# Patient Record
Sex: FEMALE | Race: WHITE | HISPANIC OR LATINO | URBAN - METROPOLITAN AREA
[De-identification: names, ages, dates, MRNs, and addresses within clinical notes are randomized per-mention and may not be internally consistent; named-entity substitution may affect disease eponyms.]

---

## 2017-06-23 ENCOUNTER — INPATIENT (INPATIENT)
Facility: HOSPITAL | Age: 82
LOS: 6 days | Discharge: HOME | DRG: 166 | End: 2017-06-30
Attending: STUDENT IN AN ORGANIZED HEALTH CARE EDUCATION/TRAINING PROGRAM | Admitting: STUDENT IN AN ORGANIZED HEALTH CARE EDUCATION/TRAINING PROGRAM
Payer: MEDICARE

## 2017-06-23 VITALS
HEART RATE: 77 BPM | RESPIRATION RATE: 18 BRPM | WEIGHT: 153 LBS | OXYGEN SATURATION: 98 % | HEIGHT: 64 IN | SYSTOLIC BLOOD PRESSURE: 136 MMHG | DIASTOLIC BLOOD PRESSURE: 74 MMHG | TEMPERATURE: 98 F

## 2017-06-23 DIAGNOSIS — K21.9 GASTRO-ESOPHAGEAL REFLUX DISEASE WITHOUT ESOPHAGITIS: ICD-10-CM

## 2017-06-23 DIAGNOSIS — Z90.710 ACQUIRED ABSENCE OF BOTH CERVIX AND UTERUS: Chronic | ICD-10-CM

## 2017-06-23 DIAGNOSIS — E11.8 TYPE 2 DIABETES MELLITUS WITH UNSPECIFIED COMPLICATIONS: ICD-10-CM

## 2017-06-23 DIAGNOSIS — D64.9 ANEMIA, UNSPECIFIED: ICD-10-CM

## 2017-06-23 DIAGNOSIS — Z90.49 ACQUIRED ABSENCE OF OTHER SPECIFIED PARTS OF DIGESTIVE TRACT: Chronic | ICD-10-CM

## 2017-06-23 DIAGNOSIS — N18.3 CHRONIC KIDNEY DISEASE, STAGE 3 (MODERATE): ICD-10-CM

## 2017-06-23 DIAGNOSIS — I10 ESSENTIAL (PRIMARY) HYPERTENSION: ICD-10-CM

## 2017-06-23 DIAGNOSIS — R05 COUGH: ICD-10-CM

## 2017-06-23 DIAGNOSIS — R63.8 OTHER SYMPTOMS AND SIGNS CONCERNING FOOD AND FLUID INTAKE: ICD-10-CM

## 2017-06-23 DIAGNOSIS — Z29.9 ENCOUNTER FOR PROPHYLACTIC MEASURES, UNSPECIFIED: ICD-10-CM

## 2017-06-23 LAB
ALBUMIN SERPL ELPH-MCNC: 3.9 G/DL — SIGNIFICANT CHANGE UP (ref 3.3–5)
ALP SERPL-CCNC: 76 U/L — SIGNIFICANT CHANGE UP (ref 40–120)
ALT FLD-CCNC: 8 U/L — LOW (ref 10–45)
ANION GAP SERPL CALC-SCNC: 14 MMOL/L — SIGNIFICANT CHANGE UP (ref 5–17)
AST SERPL-CCNC: 14 U/L — SIGNIFICANT CHANGE UP (ref 10–40)
BASOPHILS NFR BLD AUTO: 0.2 % — SIGNIFICANT CHANGE UP (ref 0–2)
BILIRUB SERPL-MCNC: 0.2 MG/DL — SIGNIFICANT CHANGE UP (ref 0.2–1.2)
BUN SERPL-MCNC: 24 MG/DL — HIGH (ref 7–23)
CALCIUM SERPL-MCNC: 9.6 MG/DL — SIGNIFICANT CHANGE UP (ref 8.4–10.5)
CHLORIDE SERPL-SCNC: 100 MMOL/L — SIGNIFICANT CHANGE UP (ref 96–108)
CO2 SERPL-SCNC: 26 MMOL/L — SIGNIFICANT CHANGE UP (ref 22–31)
CREAT SERPL-MCNC: 1.2 MG/DL — SIGNIFICANT CHANGE UP (ref 0.5–1.3)
EOSINOPHIL NFR BLD AUTO: 0.3 % — SIGNIFICANT CHANGE UP (ref 0–6)
GLUCOSE SERPL-MCNC: 103 MG/DL — HIGH (ref 70–99)
HCT VFR BLD CALC: 30.5 % — LOW (ref 34.5–45)
HGB BLD-MCNC: 9.7 G/DL — LOW (ref 11.5–15.5)
LYMPHOCYTES # BLD AUTO: 26.7 % — SIGNIFICANT CHANGE UP (ref 13–44)
MCHC RBC-ENTMCNC: 26.2 PG — LOW (ref 27–34)
MCHC RBC-ENTMCNC: 31.8 G/DL — LOW (ref 32–36)
MCV RBC AUTO: 82.4 FL — SIGNIFICANT CHANGE UP (ref 80–100)
MONOCYTES NFR BLD AUTO: 13.6 % — SIGNIFICANT CHANGE UP (ref 2–14)
NEUTROPHILS NFR BLD AUTO: 59.1 % — SIGNIFICANT CHANGE UP (ref 43–77)
NT-PROBNP SERPL-SCNC: 806 PG/ML — HIGH (ref 0–300)
PLATELET # BLD AUTO: 150 K/UL — SIGNIFICANT CHANGE UP (ref 150–400)
POTASSIUM SERPL-MCNC: 3.6 MMOL/L — SIGNIFICANT CHANGE UP (ref 3.5–5.3)
POTASSIUM SERPL-SCNC: 3.6 MMOL/L — SIGNIFICANT CHANGE UP (ref 3.5–5.3)
PROT SERPL-MCNC: 8 G/DL — SIGNIFICANT CHANGE UP (ref 6–8.3)
RBC # BLD: 3.7 M/UL — LOW (ref 3.8–5.2)
RBC # FLD: 14.2 % — SIGNIFICANT CHANGE UP (ref 10.3–16.9)
SODIUM SERPL-SCNC: 140 MMOL/L — SIGNIFICANT CHANGE UP (ref 135–145)
WBC # BLD: 3.9 K/UL — SIGNIFICANT CHANGE UP (ref 3.8–10.5)
WBC # FLD AUTO: 3.9 K/UL — SIGNIFICANT CHANGE UP (ref 3.8–10.5)

## 2017-06-23 PROCEDURE — 93010 ELECTROCARDIOGRAM REPORT: CPT

## 2017-06-23 PROCEDURE — 99285 EMERGENCY DEPT VISIT HI MDM: CPT

## 2017-06-23 PROCEDURE — 71020: CPT | Mod: 26

## 2017-06-23 PROCEDURE — 99285 EMERGENCY DEPT VISIT HI MDM: CPT | Mod: 25

## 2017-06-23 PROCEDURE — 71250 CT THORAX DX C-: CPT | Mod: 26

## 2017-06-23 RX ORDER — DIATRIZOATE MEGLUMINE 180 MG/ML
30 INJECTION, SOLUTION INTRAVESICAL ONCE
Qty: 0 | Refills: 0 | Status: COMPLETED | OUTPATIENT
Start: 2017-06-23 | End: 2017-06-23

## 2017-06-23 RX ORDER — PANTOPRAZOLE SODIUM 20 MG/1
1 TABLET, DELAYED RELEASE ORAL
Qty: 0 | Refills: 0 | COMMUNITY

## 2017-06-23 RX ORDER — FLUTICASONE FUROATE AND VILANTEROL TRIFENATATE 100; 25 UG/1; UG/1
1 POWDER RESPIRATORY (INHALATION) DAILY
Qty: 0 | Refills: 0 | Status: DISCONTINUED | OUTPATIENT
Start: 2017-06-23 | End: 2017-06-23

## 2017-06-23 RX ORDER — IPRATROPIUM/ALBUTEROL SULFATE 18-103MCG
3 AEROSOL WITH ADAPTER (GRAM) INHALATION ONCE
Qty: 0 | Refills: 0 | Status: COMPLETED | OUTPATIENT
Start: 2017-06-23 | End: 2017-06-23

## 2017-06-23 RX ORDER — BUDESONIDE AND FORMOTEROL FUMARATE DIHYDRATE 160; 4.5 UG/1; UG/1
2 AEROSOL RESPIRATORY (INHALATION)
Qty: 0 | Refills: 0 | Status: COMPLETED | OUTPATIENT
Start: 2017-06-23 | End: 2017-06-24

## 2017-06-23 RX ORDER — BECLOMETHASONE DIPROPIONATE 40 UG/1
1 AEROSOL, METERED RESPIRATORY (INHALATION)
Qty: 0 | Refills: 0 | Status: DISCONTINUED | OUTPATIENT
Start: 2017-06-23 | End: 2017-06-26

## 2017-06-23 RX ORDER — AMLODIPINE BESYLATE 2.5 MG/1
1 TABLET ORAL
Qty: 0 | Refills: 0 | COMMUNITY

## 2017-06-23 RX ORDER — HEPARIN SODIUM 5000 [USP'U]/ML
5000 INJECTION INTRAVENOUS; SUBCUTANEOUS EVERY 8 HOURS
Qty: 0 | Refills: 0 | Status: DISCONTINUED | OUTPATIENT
Start: 2017-06-23 | End: 2017-06-30

## 2017-06-23 RX ORDER — INSULIN LISPRO 100/ML
VIAL (ML) SUBCUTANEOUS
Qty: 0 | Refills: 0 | Status: DISCONTINUED | OUTPATIENT
Start: 2017-06-23 | End: 2017-06-30

## 2017-06-23 RX ORDER — SODIUM CHLORIDE 9 MG/ML
500 INJECTION INTRAMUSCULAR; INTRAVENOUS; SUBCUTANEOUS ONCE
Qty: 0 | Refills: 0 | Status: COMPLETED | OUTPATIENT
Start: 2017-06-23 | End: 2017-06-23

## 2017-06-23 RX ORDER — FLUTICASONE PROPIONATE 50 MCG
2 SPRAY, SUSPENSION NASAL DAILY
Qty: 0 | Refills: 0 | Status: DISCONTINUED | OUTPATIENT
Start: 2017-06-23 | End: 2017-06-30

## 2017-06-23 RX ORDER — SIMVASTATIN 20 MG/1
1 TABLET, FILM COATED ORAL
Qty: 0 | Refills: 0 | COMMUNITY

## 2017-06-23 RX ORDER — PANTOPRAZOLE SODIUM 20 MG/1
40 TABLET, DELAYED RELEASE ORAL
Qty: 0 | Refills: 0 | Status: DISCONTINUED | OUTPATIENT
Start: 2017-06-23 | End: 2017-06-23

## 2017-06-23 RX ORDER — PANTOPRAZOLE SODIUM 20 MG/1
20 TABLET, DELAYED RELEASE ORAL
Qty: 0 | Refills: 0 | Status: DISCONTINUED | OUTPATIENT
Start: 2017-06-23 | End: 2017-06-30

## 2017-06-23 RX ORDER — BUDESONIDE AND FORMOTEROL FUMARATE DIHYDRATE 160; 4.5 UG/1; UG/1
2 AEROSOL RESPIRATORY (INHALATION)
Qty: 0 | Refills: 0 | Status: DISCONTINUED | OUTPATIENT
Start: 2017-06-23 | End: 2017-06-23

## 2017-06-23 RX ADMIN — SODIUM CHLORIDE 1000 MILLILITER(S): 9 INJECTION INTRAMUSCULAR; INTRAVENOUS; SUBCUTANEOUS at 15:13

## 2017-06-23 RX ADMIN — HEPARIN SODIUM 5000 UNIT(S): 5000 INJECTION INTRAVENOUS; SUBCUTANEOUS at 22:07

## 2017-06-23 RX ADMIN — DIATRIZOATE MEGLUMINE 30 MILLILITER(S): 180 INJECTION, SOLUTION INTRAVESICAL at 21:08

## 2017-06-23 RX ADMIN — Medication 3 MILLILITER(S): at 15:13

## 2017-06-23 NOTE — H&P ADULT - PROBLEM SELECTOR PLAN 5
Chronic, baseline Hg 9.5-10. Has had BM biopsy in the past and follows with a hematologist.  -Continue to monitor in the hospital

## 2017-06-23 NOTE — ED PROVIDER NOTE - PROGRESS NOTE DETAILS
Ct scan shows possible miliary TB vs mets, pt informed pulm she did have a +PPD in the past. Pt placed on isoprecautions and quantferon gold ordered. Will admit to hospitalist for further workup and management. Pt remains HD stable, no resp distress.

## 2017-06-23 NOTE — CONSULT NOTE ADULT - ATTENDING COMMENTS
I have evaluated this very pleasant lady who is accompanied by her son. As mentioned, she has had cough lasting for several months now. She has lost 7 pounds of weight. Her appetite is poor. She denies any fevers or night sweats. The history of age-specific screening  is not clear.  There is no history of asthma. She is a lifelong nonsmoker  She has had sinusitis once in the past. No symptoms of postnasal drip recently  She does have symptoms of gastroesophageal reflux disease    On further questioning, the cough is very pronounced but it is nonproductive. She is from [place] and had a positive PPD in the past. No recent exposure to a family member with known tuberculosis    I concur with the physical examination examination of the lungs he is clear. She has good breath sounds bilaterally. There is no dullness to percussion noted. She has no cervical lymph nodes. Thyroid is not palpable. She has a Mallampatti score of 4    I reviewed the chest x-ray. It does not show any evidence of a reticular addition, pulmonary infiltrates or pleural effusion.    I reviewed the CT of the chest (HRCT with 1 mm thickness cuts). She has bilateral nodules that appear to be randomly distributed however some of the nodules appeared to be ground glass and centrilobular.  The Maximum Intensity Projection images (MIPS) are very revealing. He demonstrates multiple pulmonary nodules many of which are adjacent to blood vessels (vessel mass sign). Some are distant from blood vessels and are present at the subpleural areas. The multiple pulmonary nodules are more perfuse in the upper and mid lung zones and there is relative sparing of the lower lung zoned.  In addition, there are some mediastinal lymph nodes that are greater than 1 cm in diameter. On close inspection, there is a calcified granuloma at the right base adjacent to the diaphragm.    Assessment:   The differential diagnoses of me or the pattern is broad. The 2 major differential diagnoses that are relevant in this case include:  Miliary tuberculosis  Metastatic disease either from the thyroid or from the GI/genitourinary systems/breasts    Plan:  #1 admit patient in respiratory isolation   #2 sputum for AFB x3  #3 QuantiFERON test  #4 TSH, T4, thyroglobulin panel  #5 CT of the abdomen and pelvis referable either the oral and IV contrast  #6 if the diagnosis is not established by the above means, we will consider doing a bronchoscopy, BAL, transbronchial biopsy and EBUS    Case discussed with pulmonary team, Dr. Shields, ER attending and the patient's son

## 2017-06-23 NOTE — ED PROVIDER NOTE - MEDICAL DECISION MAKING DETAILS
82F with cough x 6 months productive of clearish sputum, which outpt w/u unremarkable to date. Pt HD stable no resp distress. will check labs, CXR and if unremarkable, CT chest.   Pt seen by Pulm, Dr. Marrero, agrees with need for CT scan for chronic cough and will f/u result.

## 2017-06-23 NOTE — ED PROVIDER NOTE - PHYSICAL EXAMINATION
GEN: Well appearing, well nourished, awake, alert, oriented to person, place, time/situation and in no apparent distress.  ENT: Airway patent, Nasal mucosa clear. Mouth with normal mucosa.  EYES: Clear bilaterally.  RESPIRATORY: Breathing comfortably with normal RR.  CARDIAC: Regular rate and rhythm  ABDOMEN: Soft, nontender, +bowel sounds, no rebound, rigidity, or guarding.  MSK: Range of motion is not limited, no deformities noted.  NEURO: Alert and oriented, no focal deficits.  SKIN: Skin normal color for race, warm, dry and intact. No evidence of rash.  PSYCH: Alert and oriented to person, place, time/situation. normal mood and affect. no apparent risk to self or others.

## 2017-06-23 NOTE — ED PROVIDER NOTE - OBJECTIVE STATEMENT
82F with a h/o HTN, High cholesterol, Dm who p/w cough x 6 months, worse at night, productive of clearish sputum, no hemoptysis. No F/C, no CP or SOB. S/p XR, PFTs, ENT consult and scope, trial of omeprazole, and DC'ing/changing of BP meds which were reportedly unremarkable. pt has had no improvement with multiple OTC cough meds and nasal sprays, etc.

## 2017-06-23 NOTE — H&P ADULT - PROBLEM SELECTOR PLAN 1
Progressively worsening cough over past 6 months concerning for chronic process. Night sweats and weight loss associated. 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 Bright, f/u recs  - 3x AFB sputum cx  - f/u quantiferon gold TB  - CT abdomen/pelvis to evaluate for other signs of infection or mass  - hold on antibiotics at this time  - guaifenisan for cough, can increase cough suppression medication as needed Progressively worsening cough over past 6 months concerning for chronic process. Night sweats and weight loss associated. 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 Bright, f/u recs  - Breo, Qvar, Flonase  - 3x AFB sputum cx  - f/u quantiferon gold TB  - CT abdomen/pelvis to evaluate for other signs of infection or mass  - hold on antibiotics at this time  - If no diagnosis made, then pulm will consider Bronchoscopy on Monday  - Hycodan for cough

## 2017-06-23 NOTE — H&P ADULT - PROBLEM SELECTOR PLAN 4
Patient has been checking BPs at home and have been low over past couple of day associated with dizziness per her son. Has been holding anti-HTN  - continue to hold anti-HTN and monitor BP in hospital with goal SBP<150

## 2017-06-23 NOTE — ED ADULT TRIAGE NOTE - CHIEF COMPLAINT QUOTE
Patient c/o cough for 6 months . Denies any fever , chills , chest pain nor sob . Patient been going back and forth to PMD  , series of diagnostic was done for 6mos .

## 2017-06-23 NOTE — H&P ADULT - NSHPPHYSICALEXAM_GEN_ALL_CORE
.  VITAL SIGNS:  T(C): 36.7, Max: 36.8 (06-23 @ 17:52)  T(F): 98.1, Max: 98.3 (06-23 @ 17:52)  HR: 81 (77 - 85)  BP: 136/68 (135/76 - 136/74)  BP(mean): --  RR: 18 (18 - 18)  SpO2: 94% (94% - 99%)  Wt(kg): --    PHYSICAL EXAM:    Constitutional: WDWN resting comfortably in bed; NAD  Head: NC/AT  Eyes: PERRL, EOMI, anicteric sclera  ENT: no nasal discharge; uvula midline, no oropharyngeal erythema or exudates; MMM  Neck: supple; no JVD or thyromegaly  Respiratory: CTA B/L; no W/R/R, no retractions  Cardiac: +S1/S2; RRR; no M/R/G; PMI non-displaced  Gastrointestinal: soft, NT/ND; no rebound or guarding; +BSx4  Genitourinary: normal external genitalia  Back: spine midline, no bony tenderness or step-offs; no CVAT B/L  Extremities: WWP, no clubbing or cyanosis; no peripheral edema  Musculoskeletal: NROM x4; no joint swelling, tenderness or erythema  Vascular: 2+ radial, femoral, DP/PT pulses B/L  Dermatologic: skin warm, dry and intact; no rashes, wounds, or scars  Lymphatic: no submandibular or cervical LAD  Neurologic: AAOx3; CNII-XII grossly intact; no focal deficits  Psychiatric: affect and characteristics of appearance, verbalizations, behaviors are appropriate

## 2017-06-23 NOTE — H&P ADULT - PMH
Anemia, unspecified type    CKD (chronic kidney disease) stage 3, GFR 30-59 ml/min    Diabetes    GERD (gastroesophageal reflux disease)    High cholesterol    HTN (hypertension)

## 2017-06-23 NOTE — H&P ADULT - HISTORY OF PRESENT ILLNESS
82F PMHx HTN, HLD, DMII, GERD, CKD stage IIIb, Anemia (unknown cause, s/p BM biopsy 1 yr ago) presents to [facility] complaining of worsening cough over the past six months. She has been monitored by her PCP over this time period who has taken measures to address the cough. She was never on an ACEi, but Losartan was discontinued. Omeprazole was initiated for treatment of GERD. Cough did not improve. Referred to ENT who performed laryngoscopy, but no cause for cough was identified and he prescribed ipratropium spray and singulair. Patient was up all last night coughing and decided that she could not live with it any longer and came to the ED.     The cough is productive of clear sputum, no hemoptysis. Cough is so bad that she will occasionally vomit after meals from coughing. She has had a 27lbs weight loss over the past 2 years after her  . 7lbs in last 2 months. Endorses occasional night sweats, but no fevers, chills, SOB, chest pain, abdominal pain, diarrhea, constipation, rash, numbness/weakness. No recent travel or sick contacts.    In the ED: T 97.7, HR 77, /74, RR 18/98. Patient given a duoneb, CXR, CT chest. Dr. Bright from pulmonology was consulted and patient was admitted to medicine for TB rule out.

## 2017-06-23 NOTE — CONSULT NOTE ADULT - PROBLEM SELECTOR RECOMMENDATION 9
Chronic cough.  Differential includes asthma, GERD, post nasal drip, tracheobronchomalacia.  -Would put on higher dose of Breo and add Qvar.  Would do flonase 2 sprays each nostril daily.  Pantoprazole 20mg BID before dinner and breakfast.  -HRCT now with inspiratory and expiratory views to evaluate for parenchymal lung disease (UIP) and tracheobronchomalacia.  -Full PFTs as outpatient including methacholine if normal.  -Hycodan for cough suppression.

## 2017-06-23 NOTE — H&P ADULT - NSHPSOCIALHISTORY_GEN_ALL_CORE
Moved from [place] 27 years ago. [place]. Non-smoker however  was a life long smoker. No alcohol or other drugs.

## 2017-06-23 NOTE — H&P ADULT - ASSESSMENT
82F PMHx HTN, HLD, DMII, GERD, CKD stage IIIb, Anemia (unknown cause, s/p BM biopsy 1 yr ago) presents to [facility] complaining of worsening cough over the past six months. CT chest concerning for miliary TB vs.

## 2017-06-23 NOTE — H&P ADULT - PROBLEM SELECTOR PLAN 2
baseline Cr 1.3 on outpatient labs. Currently GFR is at baseline, no intervention necessary. Likely secondary to DM and HTN

## 2017-06-23 NOTE — ED ADULT NURSE NOTE - OBJECTIVE STATEMENT
Patient to ED accompanied by son complaining of persistent cough x 6 months, "my  mother coughs so bad that it causes her to sometimes vomit up her food. These coughing spells lasts for 5 minutes straight." Patient to ED accompanied by son complaining of persistent cough x 6 months, "my  mother coughs so bad that it causes her to sometimes vomit up her food. These coughing spells lasts for 5 minutes straight." Breath sounds clear, patent airway maintained. Patient in no acute distress at this moment.

## 2017-06-24 DIAGNOSIS — D61.818 OTHER PANCYTOPENIA: ICD-10-CM

## 2017-06-24 DIAGNOSIS — R93.8 ABNORMAL FINDINGS ON DIAGNOSTIC IMAGING OF OTHER SPECIFIED BODY STRUCTURES: ICD-10-CM

## 2017-06-24 LAB
ANION GAP SERPL CALC-SCNC: 9 MMOL/L — SIGNIFICANT CHANGE UP (ref 5–17)
BUN SERPL-MCNC: 20 MG/DL — SIGNIFICANT CHANGE UP (ref 7–23)
CALCIUM SERPL-MCNC: 9 MG/DL — SIGNIFICANT CHANGE UP (ref 8.4–10.5)
CHLORIDE SERPL-SCNC: 102 MMOL/L — SIGNIFICANT CHANGE UP (ref 96–108)
CO2 SERPL-SCNC: 28 MMOL/L — SIGNIFICANT CHANGE UP (ref 22–31)
CREAT SERPL-MCNC: 1.2 MG/DL — SIGNIFICANT CHANGE UP (ref 0.5–1.3)
GLUCOSE SERPL-MCNC: 113 MG/DL — HIGH (ref 70–99)
HBA1C BLD-MCNC: 6.2 % — HIGH (ref 4–5.6)
HCT VFR BLD CALC: 26.4 % — LOW (ref 34.5–45)
HGB BLD-MCNC: 8.4 G/DL — LOW (ref 11.5–15.5)
MAGNESIUM SERPL-MCNC: 1.7 MG/DL — SIGNIFICANT CHANGE UP (ref 1.6–2.6)
MCHC RBC-ENTMCNC: 26.1 PG — LOW (ref 27–34)
MCHC RBC-ENTMCNC: 31.8 G/DL — LOW (ref 32–36)
MCV RBC AUTO: 82 FL — SIGNIFICANT CHANGE UP (ref 80–100)
NIGHT BLUE STAIN TISS: SIGNIFICANT CHANGE UP
NIGHT BLUE STAIN TISS: SIGNIFICANT CHANGE UP
PLATELET # BLD AUTO: 129 K/UL — LOW (ref 150–400)
POTASSIUM SERPL-MCNC: 3.3 MMOL/L — LOW (ref 3.5–5.3)
POTASSIUM SERPL-SCNC: 3.3 MMOL/L — LOW (ref 3.5–5.3)
RBC # BLD: 3.22 M/UL — LOW (ref 3.8–5.2)
RBC # FLD: 14.5 % — SIGNIFICANT CHANGE UP (ref 10.3–16.9)
SODIUM SERPL-SCNC: 139 MMOL/L — SIGNIFICANT CHANGE UP (ref 135–145)
SPECIMEN SOURCE: SIGNIFICANT CHANGE UP
SPECIMEN SOURCE: SIGNIFICANT CHANGE UP
TSH SERPL-MCNC: 2.18 UIU/ML — SIGNIFICANT CHANGE UP (ref 0.35–4.94)
WBC # BLD: 3 K/UL — LOW (ref 3.8–10.5)
WBC # FLD AUTO: 3 K/UL — LOW (ref 3.8–10.5)

## 2017-06-24 PROCEDURE — 74177 CT ABD & PELVIS W/CONTRAST: CPT | Mod: 26

## 2017-06-24 PROCEDURE — 99222 1ST HOSP IP/OBS MODERATE 55: CPT

## 2017-06-24 PROCEDURE — 99233 SBSQ HOSP IP/OBS HIGH 50: CPT | Mod: GC

## 2017-06-24 RX ORDER — MAGNESIUM SULFATE 500 MG/ML
2 VIAL (ML) INJECTION ONCE
Qty: 0 | Refills: 0 | Status: DISCONTINUED | OUTPATIENT
Start: 2017-06-24 | End: 2017-06-24

## 2017-06-24 RX ORDER — POTASSIUM CHLORIDE 20 MEQ
40 PACKET (EA) ORAL EVERY 4 HOURS
Qty: 0 | Refills: 0 | Status: COMPLETED | OUTPATIENT
Start: 2017-06-24 | End: 2017-06-24

## 2017-06-24 RX ORDER — MAGNESIUM OXIDE 400 MG ORAL TABLET 241.3 MG
400 TABLET ORAL ONCE
Qty: 0 | Refills: 0 | Status: COMPLETED | OUTPATIENT
Start: 2017-06-24 | End: 2017-06-24

## 2017-06-24 RX ADMIN — Medication 40 MILLIEQUIVALENT(S): at 14:23

## 2017-06-24 RX ADMIN — Medication 2 SPRAY(S): at 14:24

## 2017-06-24 RX ADMIN — Medication 2: at 21:43

## 2017-06-24 RX ADMIN — BUDESONIDE AND FORMOTEROL FUMARATE DIHYDRATE 2 PUFF(S): 160; 4.5 AEROSOL RESPIRATORY (INHALATION) at 16:57

## 2017-06-24 RX ADMIN — PANTOPRAZOLE SODIUM 20 MILLIGRAM(S): 20 TABLET, DELAYED RELEASE ORAL at 16:55

## 2017-06-24 RX ADMIN — HEPARIN SODIUM 5000 UNIT(S): 5000 INJECTION INTRAVENOUS; SUBCUTANEOUS at 14:23

## 2017-06-24 RX ADMIN — HEPARIN SODIUM 5000 UNIT(S): 5000 INJECTION INTRAVENOUS; SUBCUTANEOUS at 21:38

## 2017-06-24 RX ADMIN — BECLOMETHASONE DIPROPIONATE 1 PUFF(S): 40 AEROSOL, METERED RESPIRATORY (INHALATION) at 06:56

## 2017-06-24 RX ADMIN — HEPARIN SODIUM 5000 UNIT(S): 5000 INJECTION INTRAVENOUS; SUBCUTANEOUS at 06:44

## 2017-06-24 RX ADMIN — PANTOPRAZOLE SODIUM 20 MILLIGRAM(S): 20 TABLET, DELAYED RELEASE ORAL at 06:57

## 2017-06-24 RX ADMIN — BUDESONIDE AND FORMOTEROL FUMARATE DIHYDRATE 2 PUFF(S): 160; 4.5 AEROSOL RESPIRATORY (INHALATION) at 06:45

## 2017-06-24 RX ADMIN — BECLOMETHASONE DIPROPIONATE 1 PUFF(S): 40 AEROSOL, METERED RESPIRATORY (INHALATION) at 16:56

## 2017-06-24 RX ADMIN — MAGNESIUM OXIDE 400 MG ORAL TABLET 400 MILLIGRAM(S): 241.3 TABLET ORAL at 09:28

## 2017-06-24 RX ADMIN — Medication 40 MILLIEQUIVALENT(S): at 09:28

## 2017-06-24 NOTE — PROGRESS NOTE ADULT - SUBJECTIVE AND OBJECTIVE BOX
INTERVAL HPI/OVERNIGHT EVENTS: went for CT abdomen    SUBJECTIVE: Patient seen and examined at bedside. Has clear productive sputum and mild occasioanl cough. Denies fever,chills, diarrhea, abdominal pain, chest pain or hemoptysis.     OBJECTIVE:    VITAL SIGNS:  ICU Vital Signs Last 24 Hrs  T(C): 36.9, Max: 36.9 (06-24 @ 05:51)  T(F): 98.5, Max: 98.5 (06-24 @ 05:51)  HR: 81 (77 - 85)  BP: 110/64 (110/64 - 151/83)  BP(mean): --  ABP: --  ABP(mean): --  RR: 16 (16 - 18)  SpO2: 97% (94% - 99%)        CAPILLARY BLOOD GLUCOSE  104 (24 Jun 2017 08:06)      PHYSICAL EXAM:    Constitutional: elderly female, WDWN resting comfortably in bed; NAD, primairly Welsh speaking but follows commands given in english. son at bedside  Head: NC/AT  Eyes: PERRL, EOMI, anicteric sclera  ENT: no nasal discharge; uvula midline, no oropharyngeal erythema or exudates; MMM  Neck: supple; no JVD or thyromegaly  Respiratory: CTA B/L; no W/R/R, no retractions  Cardiac: +S1/S2; RRR; no M/R/G; PMI non-displaced  Gastrointestinal: soft, NT/ND; no rebound or guarding; +BSx4  Genitourinary: normal external genitalia  Back: spine midline, no bony tenderness or step-offs; no CVAT B/L  Extremities: WWP, no clubbing or cyanosis; no peripheral edema  Musculoskeletal: NROM x4; no joint swelling, tenderness or erythema  Vascular: 2+ radial, femoral, DP/PT pulses B/L  Dermatologic: skin warm, dry and intact; no rashes, wounds, or scars  Lymphatic: no submandibular or cervical LAD      MEDICATIONS:  MEDICATIONS  (STANDING):  heparin  Injectable 5000Unit(s) SubCutaneous every 8 hours  beclomethasone  40 MICROgram(s) Inhaler 1Puff(s) Inhalation two times a day  insulin lispro (HumaLOG) corrective regimen sliding scale  SubCutaneous Before meals and at bedtime  pantoprazole    Tablet 20milliGRAM(s) Oral two times a day before meals  fluticasone propionate 50 MICROgram(s)/spray Nasal Spray 2Spray(s) Both Nostrils daily  buDESOnide 160 MICROgram(s)/formoterol 4.5 MICROgram(s) Inhaler 2Puff(s) Inhalation two times a day  potassium chloride    Tablet ER 40milliEquivalent(s) Oral every 4 hours    MEDICATIONS  (PRN):  HYDROcodone/homatropine Syrup 5milliLiter(s) Oral every 6 hours PRN worse Cough      ALLERGIES:  Allergies    penicillin (Rash)    Intolerances        LABS:                        8.4    3.0   )-----------( 129      ( 24 Jun 2017 06:39 )             26.4     06-24    139  |  102  |  20  ----------------------------<  113<H>  3.3<L>   |  28  |  1.20    Ca    9.0      24 Jun 2017 06:39  Mg     1.7     06-24    TPro  8.0  /  Alb  3.9  /  TBili  0.2  /  DBili  x   /  AST  14  /  ALT  8<L>  /  AlkPhos  76  06-23          RADIOLOGY & ADDITIONAL TESTS: Reviewed. son and grandson at bedside, translated      INTERVAL HPI/OVERNIGHT EVENTS: went for CT abdomen    SUBJECTIVE: Patient seen and examined at bedside. Has clear productive sputum and mild occasioanl cough. Denies fever,chills, diarrhea, abdominal pain, chest pain or hemoptysis.     ROS  (- ) headache  ( -  )fevers/chills  (  - ) palpatations  ( - ) dizziness/lightheadedness  (  - ) nausea/vomiting  (  - ) abd pain  (  - ) diarrhea  (  - ) melena  (  - ) hematochezia  (  - ) dysuria   ( - ) hematuria  (  - ) leg swelling    ( - ) calf tenderness  (  - ) motor weakness  ( - ) extremity numbness  ( - ) back pain  ( + ) tolerating POs  ( + ) BM  ROS: 12 point review of systems otherwise negative           OBJECTIVE:    VITAL SIGNS:  ICU Vital Signs Last 24 Hrs  T(C): 36.9, Max: 36.9 (06-24 @ 05:51)  T(F): 98.5, Max: 98.5 (06-24 @ 05:51)  HR: 81 (77 - 85)  BP: 110/64 (110/64 - 151/83)  BP(mean): --  ABP: --  ABP(mean): --  RR: 16 (16 - 18)  SpO2: 97% (94% - 99%)        CAPILLARY BLOOD GLUCOSE  104 (24 Jun 2017 08:06)      PHYSICAL EXAM:    Constitutional: elderly female, WDWN resting comfortably in bed; NAD, primairly American speaking but follows commands given in english. son at bedside  Head: NC/AT  Eyes: PERRL, EOMI, anicteric sclera  ENT: no nasal discharge; uvula midline, no oropharyngeal erythema or exudates; MMM  Neck: supple; no JVD or thyromegaly  Respiratory: CTA B/L; no W/R/R, no retractions  Cardiac: +S1/S2; RRR; no M/R/G; PMI non-displaced  Gastrointestinal: soft, NT/ND; no rebound or guarding; +BSx4  Genitourinary: normal external genitalia  Back: spine midline, no bony tenderness or step-offs; no CVAT B/L  Extremities: WWP, no clubbing or cyanosis; no peripheral edema  Musculoskeletal: NROM x4; no joint swelling, tenderness or erythema  Vascular: 2+ radial, femoral, DP/PT pulses B/L  Dermatologic: skin warm, dry and intact; no rashes, wounds, or scars  Lymphatic: no submandibular or cervical LAD  neuro: cn ii-xii intact, 5/5 str all 4 ext, sensation intact bl      MEDICATIONS:  MEDICATIONS  (STANDING):  heparin  Injectable 5000Unit(s) SubCutaneous every 8 hours  beclomethasone  40 MICROgram(s) Inhaler 1Puff(s) Inhalation two times a day  insulin lispro (HumaLOG) corrective regimen sliding scale  SubCutaneous Before meals and at bedtime  pantoprazole    Tablet 20milliGRAM(s) Oral two times a day before meals  fluticasone propionate 50 MICROgram(s)/spray Nasal Spray 2Spray(s) Both Nostrils daily  buDESOnide 160 MICROgram(s)/formoterol 4.5 MICROgram(s) Inhaler 2Puff(s) Inhalation two times a day  potassium chloride    Tablet ER 40milliEquivalent(s) Oral every 4 hours    MEDICATIONS  (PRN):  HYDROcodone/homatropine Syrup 5milliLiter(s) Oral every 6 hours PRN worse Cough      ALLERGIES:  Allergies    penicillin (Rash)    Intolerances        LABS:                        8.4    3.0   )-----------( 129      ( 24 Jun 2017 06:39 )             26.4     06-24    139  |  102  |  20  ----------------------------<  113<H>  3.3<L>   |  28  |  1.20    Ca    9.0      24 Jun 2017 06:39  Mg     1.7     06-24    TPro  8.0  /  Alb  3.9  /  TBili  0.2  /  DBili  x   /  AST  14  /  ALT  8<L>  /  AlkPhos  76  06-23          RADIOLOGY & ADDITIONAL TESTS: Reviewed.

## 2017-06-24 NOTE — PROGRESS NOTE ADULT - PROBLEM SELECTOR PLAN 1
Her multiple pulmonary nodules raise suspion for miliary TB and ?metastatic disease.  - Her AFB is neg x1 - do not suspect +sputums if this is miliary TB  - Continue AFB sputums.   - Will plan for bronchoscopy monday am. - will need to be NPO after MN on Sunday night.   - check HIV. Her multiple pulmonary nodules raise suspicion for miliary TB and ?metastatic disease.  - Her AFB is neg x1 - do not suspect +sputums if this is miliary TB  - Continue AFB sputums.   - Will plan for bronchoscopy monday am. - will need to be NPO after MN on Sunday night.   - check HIV.

## 2017-06-24 NOTE — PROGRESS NOTE ADULT - PROBLEM SELECTOR PLAN 5
Chronic, baseline Hg 9.5-10. Has had BM biopsy in the past and follows with a hematologist.  -Continue to monitor in the hospital Chronic, baseline Hg 9.5-10. Has had BM biopsy in the past and follows with a hematologist. WBC and platelet counts also low. Hemodynamically stable and no signs of bruising, petechiae, ecchymoses or active bleed  -Continue to monitor in the hospital

## 2017-06-24 NOTE — PROGRESS NOTE ADULT - PROBLEM SELECTOR PLAN 1
Progressively worsening cough over past 6 months concerning for chronic process. Night sweats and weight loss associated. 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 Bright, f/u recs  - Breo, Qvar, Flonase  - 3x AFB sputum cx  - f/u quantiferon gold TB  - CT abdomen/pelvis to evaluate for other signs of infection or mass  - hold on antibiotics at this time  - If no diagnosis made, then pulm will consider Bronchoscopy on Monday  - Hycodan for cough Progressively worsening cough over past 6 months  .   no Night sweats   (+) weight loss (worsening PO intake)   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 Bright, f/u recs  - Breo, Qvar, Flonase  - 3x AFB sputum cx  - f/u quantiferon gold TB  - CT abdomen/pelvis to evaluate for other signs of infection or mass  - hold on antibiotics at this time  - If no diagnosis made, then pulm will consider Bronchoscopy on Monday  - Hycodan for cough

## 2017-06-24 NOTE — PROGRESS NOTE ADULT - SUBJECTIVE AND OBJECTIVE BOX
Interval Events:  Patient seen and examined at bedside.  MARITA        MEDICATIONS:  Pulmonary:  beclomethasone  40 MICROgram(s) Inhaler 1Puff(s) Inhalation two times a day  HYDROcodone/homatropine Syrup 5milliLiter(s) Oral every 6 hours PRN    Antimicrobials:    Anticoagulants:  heparin  Injectable 5000Unit(s) SubCutaneous every 8 hours    Cardiac:      Allergies    penicillin (Rash)    Intolerances        Vital Signs Last 24 Hrs  T(C): 36.9, Max: 36.9 (06-24 @ 05:51)  T(F): 98.5, Max: 98.5 (06-24 @ 05:51)  HR: 79 (79 - 81)  BP: 115/64 (110/64 - 151/83)  BP(mean): --  RR: 14 (14 - 16)  SpO2: 97% (97% - 98%)          LABS:      CBC Full  -  ( 24 Jun 2017 06:39 )  WBC Count : 3.0 K/uL  Hemoglobin : 8.4 g/dL  Hematocrit : 26.4 %  Platelet Count - Automated : 129 K/uL  Mean Cell Volume : 82.0 fL  Mean Cell Hemoglobin : 26.1 pg  Mean Cell Hemoglobin Concentration : 31.8 g/dL  Auto Neutrophil # : x  Auto Lymphocyte # : x  Auto Monocyte # : x  Auto Eosinophil # : x  Auto Basophil # : x  Auto Neutrophil % : x  Auto Lymphocyte % : x  Auto Monocyte % : x  Auto Eosinophil % : x  Auto Basophil % : x    06-24    139  |  102  |  20  ----------------------------<  113<H>  3.3<L>   |  28  |  1.20    Ca    9.0      24 Jun 2017 06:39  Mg     1.7     06-24    TPro  8.0  /  Alb  3.9  /  TBili  0.2  /  DBili  x   /  AST  14  /  ALT  8<L>  /  AlkPhos  76  06-23                    RADIOLOGY & ADDITIONAL STUDIES (The following images were personally reviewed):

## 2017-06-25 DIAGNOSIS — R91.8 OTHER NONSPECIFIC ABNORMAL FINDING OF LUNG FIELD: ICD-10-CM

## 2017-06-25 PROCEDURE — 99233 SBSQ HOSP IP/OBS HIGH 50: CPT | Mod: GC

## 2017-06-25 RX ADMIN — HEPARIN SODIUM 5000 UNIT(S): 5000 INJECTION INTRAVENOUS; SUBCUTANEOUS at 06:17

## 2017-06-25 RX ADMIN — Medication 2 SPRAY(S): at 12:39

## 2017-06-25 RX ADMIN — Medication 2: at 17:25

## 2017-06-25 RX ADMIN — HEPARIN SODIUM 5000 UNIT(S): 5000 INJECTION INTRAVENOUS; SUBCUTANEOUS at 21:46

## 2017-06-25 RX ADMIN — PANTOPRAZOLE SODIUM 20 MILLIGRAM(S): 20 TABLET, DELAYED RELEASE ORAL at 06:17

## 2017-06-25 RX ADMIN — BECLOMETHASONE DIPROPIONATE 1 PUFF(S): 40 AEROSOL, METERED RESPIRATORY (INHALATION) at 06:17

## 2017-06-25 RX ADMIN — PANTOPRAZOLE SODIUM 20 MILLIGRAM(S): 20 TABLET, DELAYED RELEASE ORAL at 17:21

## 2017-06-25 RX ADMIN — HEPARIN SODIUM 5000 UNIT(S): 5000 INJECTION INTRAVENOUS; SUBCUTANEOUS at 14:11

## 2017-06-25 RX ADMIN — BECLOMETHASONE DIPROPIONATE 1 PUFF(S): 40 AEROSOL, METERED RESPIRATORY (INHALATION) at 17:21

## 2017-06-25 NOTE — PROGRESS NOTE ADULT - SUBJECTIVE AND OBJECTIVE BOX
Patient is a 82y old  Female who presents with a chief complaint of Cough (23 Jun 2017 18:09)      INTERVAL HPI/OVERNIGHT EVENTS:  son at bedside (translating)  cough is improved, non productive  no dyspnea, chest pain      ROS  (- ) headache  ( -  )fevers/chills  ( - ) dyspnea  (  - ) cough  (  - ) chest pain  (  - ) palpatations  ( - ) dizziness/lightheadedness  (  - ) nausea/vomiting  (  - ) abd pain  (  - ) diarrhea  (  - ) melena  (  - ) hematochezia  (  - ) dysuria   ( - ) hematuria  (  - ) leg swelling    ( - ) calf tenderness  (  - ) motor weakness  ( - ) extremity numbness  ( - ) back pain  ( + ) tolerating POs  ( + ) BM  ROS: 12 point review of systems otherwise negative              MEDICATIONS  (STANDING):  heparin  Injectable 5000Unit(s) SubCutaneous every 8 hours  beclomethasone  40 MICROgram(s) Inhaler 1Puff(s) Inhalation two times a day  insulin lispro (HumaLOG) corrective regimen sliding scale  SubCutaneous Before meals and at bedtime  pantoprazole    Tablet 20milliGRAM(s) Oral two times a day before meals  fluticasone propionate 50 MICROgram(s)/spray Nasal Spray 2Spray(s) Both Nostrils daily    MEDICATIONS  (PRN):  HYDROcodone/homatropine Syrup 5milliLiter(s) Oral every 6 hours PRN worse Cough      Allergies    penicillin (Rash)    Intolerances          Vital Signs Last 24 Hrs  T(C): 37.2, Max: 37.2 (06-24 @ 20:54)  T(F): 99, Max: 99 (06-25 @ 10:30)  HR: 73 (73 - 79)  BP: 114/78 (102/51 - 114/78)  BP(mean): --  RR: 16 (15 - 16)  SpO2: 98% (94% - 98%)  CAPILLARY BLOOD GLUCOSE  135 (25 Jun 2017 11:21)  114 (25 Jun 2017 08:18)  153 (24 Jun 2017 20:54)  113 (24 Jun 2017 16:35)        Physical Exam:    Daily     Constitutional: elderly female, WDWN resting comfortably in bed; NAD, primairly Portuguese speaking but follows commands given in english. son at bedside  Head: NC/AT  Eyes: PERRL, EOMI, anicteric sclera  ENT: no nasal discharge; uvula midline, no oropharyngeal erythema or exudates; MMM  Neck: supple; no JVD or thyromegaly  Respiratory: CTA B/L; no W/R/R, no retractions  Cardiac: +S1/S2; RRR; no M/R/G; PMI non-displaced  Gastrointestinal: soft, NT/ND; no rebound or guarding; +BSx4  Genitourinary: normal external genitalia  Back: spine midline, no bony tenderness or step-offs; no CVAT B/L  Extremities: WWP, no clubbing or cyanosis; no peripheral edema  Musculoskeletal: NROM x4; no joint swelling, tenderness or erythema  Vascular: 2+ radial, femoral, DP/PT pulses B/L  Dermatologic: skin warm, dry and intact; no rashes, wounds, or scars  Lymphatic: no submandibular or cervical LAD  neuro: cn ii-xii intact, 5/5 str all 4 ext, sensation intact bl    LABS:                        8.4    3.0   )-----------( 129      ( 24 Jun 2017 06:39 )             26.4     06-24    139  |  102  |  20  ----------------------------<  113<H>  3.3<L>   |  28  |  1.20    Ca    9.0      24 Jun 2017 06:39  Mg     1.7     06-24              RADIOLOGY & ADDITIONAL TESTS:

## 2017-06-25 NOTE — PROGRESS NOTE ADULT - PROBLEM SELECTOR PLAN 6
Chronic, baseline Hg 9.5-10. Has had BM biopsy in the past and follows with a hematologist. WBC and platelet counts also low. Hemodynamically stable and no signs of bruising, petechiae, ecchymoses or active bleed  -Continue to monitor in the hospital

## 2017-06-25 NOTE — PROGRESS NOTE ADULT - PROBLEM SELECTOR PLAN 1
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

## 2017-06-26 ENCOUNTER — RESULT REVIEW (OUTPATIENT)
Age: 82
End: 2017-06-26

## 2017-06-26 DIAGNOSIS — R91.8 OTHER NONSPECIFIC ABNORMAL FINDING OF LUNG FIELD: ICD-10-CM

## 2017-06-26 LAB
ALBUMIN SERPL ELPH-MCNC: 3.4 G/DL — SIGNIFICANT CHANGE UP (ref 3.3–5)
ALP SERPL-CCNC: 79 U/L — SIGNIFICANT CHANGE UP (ref 40–120)
ALT FLD-CCNC: 10 U/L — SIGNIFICANT CHANGE UP (ref 10–45)
ANION GAP SERPL CALC-SCNC: 13 MMOL/L — SIGNIFICANT CHANGE UP (ref 5–17)
ANION GAP SERPL CALC-SCNC: 14 MMOL/L — SIGNIFICANT CHANGE UP (ref 5–17)
APPEARANCE UR: CLEAR — SIGNIFICANT CHANGE UP
APTT BLD: 27.5 SEC — SIGNIFICANT CHANGE UP (ref 27.5–37.4)
APTT BLD: 29.5 SEC — SIGNIFICANT CHANGE UP (ref 27.5–37.4)
AST SERPL-CCNC: 19 U/L — SIGNIFICANT CHANGE UP (ref 10–40)
B PERT IGG+IGM PNL SER: CLEAR — SIGNIFICANT CHANGE UP
BACTERIA # UR AUTO: PRESENT /HPF
BASE EXCESS BLDA CALC-SCNC: 3.3 MMOL/L — HIGH (ref -2–3)
BASOPHILS NFR BLD AUTO: 0 % — SIGNIFICANT CHANGE UP (ref 0–2)
BILIRUB DIRECT SERPL-MCNC: <0.2 MG/DL — SIGNIFICANT CHANGE UP (ref 0–0.2)
BILIRUB INDIRECT FLD-MCNC: >0.1 MG/DL — LOW (ref 0.2–1)
BILIRUB SERPL-MCNC: 0.3 MG/DL — SIGNIFICANT CHANGE UP (ref 0.2–1.2)
BILIRUB UR-MCNC: NEGATIVE — SIGNIFICANT CHANGE UP
BUN SERPL-MCNC: 17 MG/DL — SIGNIFICANT CHANGE UP (ref 7–23)
BUN SERPL-MCNC: 18 MG/DL — SIGNIFICANT CHANGE UP (ref 7–23)
CALCIUM SERPL-MCNC: 8.9 MG/DL — SIGNIFICANT CHANGE UP (ref 8.4–10.5)
CALCIUM SERPL-MCNC: 9.3 MG/DL — SIGNIFICANT CHANGE UP (ref 8.4–10.5)
CHLORIDE SERPL-SCNC: 100 MMOL/L — SIGNIFICANT CHANGE UP (ref 96–108)
CHLORIDE SERPL-SCNC: 99 MMOL/L — SIGNIFICANT CHANGE UP (ref 96–108)
CO2 SERPL-SCNC: 26 MMOL/L — SIGNIFICANT CHANGE UP (ref 22–31)
CO2 SERPL-SCNC: 26 MMOL/L — SIGNIFICANT CHANGE UP (ref 22–31)
COLOR FLD: SIGNIFICANT CHANGE UP
COLOR SPEC: YELLOW — SIGNIFICANT CHANGE UP
COMMENT - FLUIDS: SIGNIFICANT CHANGE UP
CREAT SERPL-MCNC: 1 MG/DL — SIGNIFICANT CHANGE UP (ref 0.5–1.3)
CREAT SERPL-MCNC: 1 MG/DL — SIGNIFICANT CHANGE UP (ref 0.5–1.3)
DIFF PNL FLD: (no result)
EOSINOPHIL NFR BLD AUTO: 0.3 % — SIGNIFICANT CHANGE UP (ref 0–6)
EPI CELLS # UR: (no result) /HPF
FLUID INTAKE SUBSTANCE CLASS: SIGNIFICANT CHANGE UP
FLUID SEGMENTED GRANULOCYTES: 17 % — SIGNIFICANT CHANGE UP
GAS PNL BLDA: SIGNIFICANT CHANGE UP
GLUCOSE SERPL-MCNC: 112 MG/DL — HIGH (ref 70–99)
GLUCOSE SERPL-MCNC: 124 MG/DL — HIGH (ref 70–99)
GLUCOSE UR QL: NEGATIVE — SIGNIFICANT CHANGE UP
HCO3 BLDA-SCNC: 27 MMOL/L — SIGNIFICANT CHANGE UP (ref 21–28)
HCT VFR BLD CALC: 28.1 % — LOW (ref 34.5–45)
HCT VFR BLD CALC: 29.2 % — LOW (ref 34.5–45)
HGB BLD-MCNC: 9 G/DL — LOW (ref 11.5–15.5)
HGB BLD-MCNC: 9.1 G/DL — LOW (ref 11.5–15.5)
HIV 1+2 AB+HIV1 P24 AG SERPL QL IA: SIGNIFICANT CHANGE UP
INR BLD: 1.11 — SIGNIFICANT CHANGE UP (ref 0.88–1.16)
INR BLD: 1.11 — SIGNIFICANT CHANGE UP (ref 0.88–1.16)
KETONES UR-MCNC: NEGATIVE — SIGNIFICANT CHANGE UP
LACTATE SERPL-SCNC: 1.2 MMOL/L — SIGNIFICANT CHANGE UP (ref 0.5–2)
LEUKOCYTE ESTERASE UR-ACNC: NEGATIVE — SIGNIFICANT CHANGE UP
LYMPHOCYTES # BLD AUTO: 32.3 % — SIGNIFICANT CHANGE UP (ref 13–44)
LYMPHOCYTES # FLD: 1 % — SIGNIFICANT CHANGE UP
MAGNESIUM SERPL-MCNC: 1.7 MG/DL — SIGNIFICANT CHANGE UP (ref 1.6–2.6)
MAGNESIUM SERPL-MCNC: 1.8 MG/DL — SIGNIFICANT CHANGE UP (ref 1.6–2.6)
MCHC RBC-ENTMCNC: 25.7 PG — LOW (ref 27–34)
MCHC RBC-ENTMCNC: 26.4 PG — LOW (ref 27–34)
MCHC RBC-ENTMCNC: 30.8 G/DL — LOW (ref 32–36)
MCHC RBC-ENTMCNC: 32.4 G/DL — SIGNIFICANT CHANGE UP (ref 32–36)
MCV RBC AUTO: 81.4 FL — SIGNIFICANT CHANGE UP (ref 80–100)
MCV RBC AUTO: 83.4 FL — SIGNIFICANT CHANGE UP (ref 80–100)
MONOCYTES NFR BLD AUTO: 13.2 % — SIGNIFICANT CHANGE UP (ref 2–14)
NEUTROPHILS NFR BLD AUTO: 54.2 % — SIGNIFICANT CHANGE UP (ref 43–77)
NIGHT BLUE STAIN TISS: SIGNIFICANT CHANGE UP
NITRITE UR-MCNC: NEGATIVE — SIGNIFICANT CHANGE UP
PCO2 BLDA: 36 MMHG — SIGNIFICANT CHANGE UP (ref 32–45)
PH BLDA: 7.49 — HIGH (ref 7.35–7.45)
PH UR: 5 — SIGNIFICANT CHANGE UP (ref 5–8)
PHOSPHATE SERPL-MCNC: 3.1 MG/DL — SIGNIFICANT CHANGE UP (ref 2.5–4.5)
PLATELET # BLD AUTO: 116 K/UL — LOW (ref 150–400)
PLATELET # BLD AUTO: 117 K/UL — LOW (ref 150–400)
PO2 BLDA: 55 MMHG — CRITICAL LOW (ref 83–108)
POTASSIUM SERPL-MCNC: 3.9 MMOL/L — SIGNIFICANT CHANGE UP (ref 3.5–5.3)
POTASSIUM SERPL-MCNC: 4.1 MMOL/L — SIGNIFICANT CHANGE UP (ref 3.5–5.3)
POTASSIUM SERPL-SCNC: 3.9 MMOL/L — SIGNIFICANT CHANGE UP (ref 3.5–5.3)
POTASSIUM SERPL-SCNC: 4.1 MMOL/L — SIGNIFICANT CHANGE UP (ref 3.5–5.3)
PROT SERPL-MCNC: 7 G/DL — SIGNIFICANT CHANGE UP (ref 6–8.3)
PROT UR-MCNC: (no result) MG/DL
PROTHROM AB SERPL-ACNC: 12.3 SEC — SIGNIFICANT CHANGE UP (ref 9.8–12.7)
PROTHROM AB SERPL-ACNC: 12.4 SEC — SIGNIFICANT CHANGE UP (ref 9.8–12.7)
RBC # BLD: 3.45 M/UL — LOW (ref 3.8–5.2)
RBC # BLD: 3.5 M/UL — LOW (ref 3.8–5.2)
RBC # FLD: 14.4 % — SIGNIFICANT CHANGE UP (ref 10.3–16.9)
RBC # FLD: 14.4 % — SIGNIFICANT CHANGE UP (ref 10.3–16.9)
RBC CASTS # UR COMP ASSIST: < 5 /HPF — SIGNIFICANT CHANGE UP
RCV VOL RI: 154 /UL — HIGH (ref 0–5)
SAO2 % BLDA: 90 % — LOW (ref 95–100)
SODIUM SERPL-SCNC: 138 MMOL/L — SIGNIFICANT CHANGE UP (ref 135–145)
SODIUM SERPL-SCNC: 140 MMOL/L — SIGNIFICANT CHANGE UP (ref 135–145)
SP GR SPEC: 1.01 — SIGNIFICANT CHANGE UP (ref 1–1.03)
SPECIMEN SOURCE FLD: SIGNIFICANT CHANGE UP
SPECIMEN SOURCE: SIGNIFICANT CHANGE UP
TOTAL NUCLEATED CELL COUNT, BODY FLUID: 18 /UL — HIGH (ref 0–5)
TUBE TYPE: SIGNIFICANT CHANGE UP
UROBILINOGEN FLD QL: 0.2 E.U./DL — SIGNIFICANT CHANGE UP
WBC # BLD: 3 K/UL — LOW (ref 3.8–10.5)
WBC # BLD: 4.5 K/UL — SIGNIFICANT CHANGE UP (ref 3.8–10.5)
WBC # FLD AUTO: 3 K/UL — LOW (ref 3.8–10.5)
WBC # FLD AUTO: 4.5 K/UL — SIGNIFICANT CHANGE UP (ref 3.8–10.5)
WBC UR QL: < 5 /HPF — SIGNIFICANT CHANGE UP

## 2017-06-26 PROCEDURE — 31624 DX BRONCHOSCOPE/LAVAGE: CPT

## 2017-06-26 PROCEDURE — 99233 SBSQ HOSP IP/OBS HIGH 50: CPT

## 2017-06-26 PROCEDURE — 71010: CPT | Mod: 26,76

## 2017-06-26 PROCEDURE — 99232 SBSQ HOSP IP/OBS MODERATE 35: CPT | Mod: 25

## 2017-06-26 PROCEDURE — 31628 BRONCHOSCOPY/LUNG BX EACH: CPT

## 2017-06-26 PROCEDURE — 31625 BRONCHOSCOPY W/BIOPSY(S): CPT | Mod: 59

## 2017-06-26 PROCEDURE — 31653 BRONCH EBUS SAMPLNG 3/> NODE: CPT

## 2017-06-26 RX ORDER — IPRATROPIUM/ALBUTEROL SULFATE 18-103MCG
3 AEROSOL WITH ADAPTER (GRAM) INHALATION EVERY 6 HOURS
Qty: 0 | Refills: 0 | Status: DISCONTINUED | OUTPATIENT
Start: 2017-06-26 | End: 2017-06-30

## 2017-06-26 RX ORDER — ACETAMINOPHEN 500 MG
650 TABLET ORAL EVERY 6 HOURS
Qty: 0 | Refills: 0 | Status: DISCONTINUED | OUTPATIENT
Start: 2017-06-26 | End: 2017-06-30

## 2017-06-26 RX ORDER — AZTREONAM 2 G
1000 VIAL (EA) INJECTION EVERY 8 HOURS
Qty: 0 | Refills: 0 | Status: COMPLETED | OUTPATIENT
Start: 2017-06-26 | End: 2017-06-27

## 2017-06-26 RX ORDER — IBUPROFEN 200 MG
200 TABLET ORAL ONCE
Qty: 0 | Refills: 0 | Status: COMPLETED | OUTPATIENT
Start: 2017-06-26 | End: 2017-06-26

## 2017-06-26 RX ORDER — SODIUM CHLORIDE 9 MG/ML
500 INJECTION INTRAMUSCULAR; INTRAVENOUS; SUBCUTANEOUS ONCE
Qty: 0 | Refills: 0 | Status: COMPLETED | OUTPATIENT
Start: 2017-06-26 | End: 2017-06-26

## 2017-06-26 RX ORDER — AZTREONAM 2 G
VIAL (EA) INJECTION
Qty: 0 | Refills: 0 | Status: DISCONTINUED | OUTPATIENT
Start: 2017-06-26 | End: 2017-06-26

## 2017-06-26 RX ORDER — AZTREONAM 2 G
1000 VIAL (EA) INJECTION EVERY 8 HOURS
Qty: 0 | Refills: 0 | Status: DISCONTINUED | OUTPATIENT
Start: 2017-06-27 | End: 2017-06-27

## 2017-06-26 RX ORDER — VANCOMYCIN HCL 1 G
1000 VIAL (EA) INTRAVENOUS EVERY 12 HOURS
Qty: 0 | Refills: 0 | Status: DISCONTINUED | OUTPATIENT
Start: 2017-06-26 | End: 2017-06-27

## 2017-06-26 RX ADMIN — Medication 250 MILLIGRAM(S): at 18:57

## 2017-06-26 RX ADMIN — SODIUM CHLORIDE 2000 MILLILITER(S): 9 INJECTION INTRAMUSCULAR; INTRAVENOUS; SUBCUTANEOUS at 18:56

## 2017-06-26 RX ADMIN — SODIUM CHLORIDE 2000 MILLILITER(S): 9 INJECTION INTRAMUSCULAR; INTRAVENOUS; SUBCUTANEOUS at 17:56

## 2017-06-26 RX ADMIN — Medication 3 MILLILITER(S): at 22:12

## 2017-06-26 RX ADMIN — Medication 200 MILLIGRAM(S): at 19:29

## 2017-06-26 RX ADMIN — BECLOMETHASONE DIPROPIONATE 1 PUFF(S): 40 AEROSOL, METERED RESPIRATORY (INHALATION) at 05:29

## 2017-06-26 RX ADMIN — Medication 650 MILLIGRAM(S): at 17:56

## 2017-06-26 RX ADMIN — HEPARIN SODIUM 5000 UNIT(S): 5000 INJECTION INTRAVENOUS; SUBCUTANEOUS at 22:13

## 2017-06-26 RX ADMIN — Medication 200 MILLIGRAM(S): at 19:47

## 2017-06-26 RX ADMIN — Medication 50 MILLIGRAM(S): at 22:12

## 2017-06-26 NOTE — PROVIDER CONTACT NOTE (CHANGE IN STATUS NOTIFICATION) - BACKGROUND
81 yo F, adm for chronic cough 6 mons, on airborne precautions r/o TB, had a bronch w/F&A, aspiration w/jfine needle, transbronchial of RUL, endobronchial RUL, lavage/wash today.

## 2017-06-26 NOTE — PROGRESS NOTE ADULT - SUBJECTIVE AND OBJECTIVE BOX
Interval Events:  Patient seen and examined at bedside.  Patient feels well with no complaints.  Wants to go home.  NPO since midnight for bronchoscopy.            Vital Signs Last 24 Hrs  T(C): 36.8, Max: 37.8 (06-25 @ 21:06)  T(F): 98.3, Max: 100 (06-25 @ 21:06)  HR: 88 (73 - 88)  BP: 120/72 (113/65 - 145/76)  BP(mean): --  RR: 18 (16 - 18)  SpO2: 96% (96% - 98%)        PHYSICAL EXAM:    General: Well developed; well nourished; in no acute distress  Neck: Supple; non tender; no masses  Respiratory: Clear to auscultation bilaterally without wheezing, rhonchi or rales  Cardiovascular: Regular rate and rhythm. S1 and S2 Normal; No murmurs, gallops or rubs  Gastrointestinal: Soft non-tender non-distended; Normal bowel sounds  Extremities: Normal range of motion, No clubbing, cyanosis or edema  Neurological: Alert and oriented x3  Skin: Warm and dry. No obvious rash    LABS:      CBC Full  -  ( 26 Jun 2017 05:41 )  WBC Count : 3.0 K/uL  Hemoglobin : 9.1 g/dL  Hematocrit : 28.1 %  Platelet Count - Automated : 117 K/uL  Mean Cell Volume : 81.4 fL  Mean Cell Hemoglobin : 26.4 pg  Mean Cell Hemoglobin Concentration : 32.4 g/dL  Auto Neutrophil # : x  Auto Lymphocyte # : x  Auto Monocyte # : x  Auto Eosinophil # : x  Auto Basophil # : x  Auto Neutrophil % : 54.2 %  Auto Lymphocyte % : 32.3 %  Auto Monocyte % : 13.2 %  Auto Eosinophil % : 0.3 %  Auto Basophil % : 0.0 %    06-26    140  |  100  |  18  ----------------------------<  112<H>  3.9   |  26  |  1.00    Ca    9.3      26 Jun 2017 05:40  Mg     1.8     06-26      PT/INR - ( 26 Jun 2017 05:42 )   PT: 12.3 sec;   INR: 1.11          PTT - ( 26 Jun 2017 05:42 )  PTT:27.5 sec                  RADIOLOGY & ADDITIONAL STUDIES (The following images were personally reviewed):

## 2017-06-26 NOTE — PROGRESS NOTE ADULT - SUBJECTIVE AND OBJECTIVE BOX
INTERVAL HPI/OVERNIGHT EVENTS:  Coughing through the night. Denies fever, hemoptysis, N/V/D, dysuria.  VITAL SIGNS:  T(F): 98.3  HR: 88  BP: 120/72  RR: 18  SpO2: 96%  Wt(kg): --    PHYSICAL EXAM:    Constitutional: appears well, in good spirits  Eyes: PERRL, EOMI  ENMT: MMM  Neck: supple  Respiratory: CTAB  Cardiovascular: RRR  Gastrointestinal: soft, NTND, normal BS  Extremities: no edema  Vascular: WWP  Neurological: A&Ox3, normal strength and sensation      MEDICATIONS  (STANDING):  heparin  Injectable 5000Unit(s) SubCutaneous every 8 hours  beclomethasone  40 MICROgram(s) Inhaler 1Puff(s) Inhalation two times a day  insulin lispro (HumaLOG) corrective regimen sliding scale  SubCutaneous Before meals and at bedtime  pantoprazole    Tablet 20milliGRAM(s) Oral two times a day before meals  fluticasone propionate 50 MICROgram(s)/spray Nasal Spray 2Spray(s) Both Nostrils daily    MEDICATIONS  (PRN):  HYDROcodone/homatropine Syrup 5milliLiter(s) Oral every 6 hours PRN worse Cough      Allergies    penicillin (Rash)    Intolerances        LABS:                        9.1    3.0   )-----------( 117      ( 26 Jun 2017 05:41 )             28.1     06-26    140  |  100  |  18  ----------------------------<  112<H>  3.9   |  26  |  1.00    Ca    9.3      26 Jun 2017 05:40  Mg     1.8     06-26      PT/INR - ( 26 Jun 2017 05:42 )   PT: 12.3 sec;   INR: 1.11          PTT - ( 26 Jun 2017 05:42 )  PTT:27.5 sec      RADIOLOGY & ADDITIONAL TESTS: Reviewed INTERVAL HPI/OVERNIGHT EVENTS:  Coughing through the night. improved w/ hycodan.     ROS  Denies fever,chills, abd pain, chest pain,  hemoptysis, N/V/D, dysuria.        VITAL SIGNS:  T(F): 98.3  HR: 88  BP: 120/72  RR: 18  SpO2: 96%  Wt(kg): --    PHYSICAL EXAM:    Constitutional: appears well, in good spirits  Eyes: PERRL, EOMI  ENMT: MMM  Neck: supple  Respiratory: CTAB, no w/r/r  Cardiovascular: RRR, s1 s2nml,   Gastrointestinal: soft, NTND, normal BS  Extremities: no edema  Vascular: WWP  Neurological: A&Ox3, 5/5 str all 4 ext, sensation intact bl, normal strength and sensation      MEDICATIONS  (STANDING):  heparin  Injectable 5000Unit(s) SubCutaneous every 8 hours  beclomethasone  40 MICROgram(s) Inhaler 1Puff(s) Inhalation two times a day  insulin lispro (HumaLOG) corrective regimen sliding scale  SubCutaneous Before meals and at bedtime  pantoprazole    Tablet 20milliGRAM(s) Oral two times a day before meals  fluticasone propionate 50 MICROgram(s)/spray Nasal Spray 2Spray(s) Both Nostrils daily    MEDICATIONS  (PRN):  HYDROcodone/homatropine Syrup 5milliLiter(s) Oral every 6 hours PRN worse Cough      Allergies    penicillin (Rash)    Intolerances        LABS:                        9.1    3.0   )-----------( 117      ( 26 Jun 2017 05:41 )             28.1     06-26    140  |  100  |  18  ----------------------------<  112<H>  3.9   |  26  |  1.00    Ca    9.3      26 Jun 2017 05:40  Mg     1.8     06-26      PT/INR - ( 26 Jun 2017 05:42 )   PT: 12.3 sec;   INR: 1.11          PTT - ( 26 Jun 2017 05:42 )  PTT:27.5 sec      RADIOLOGY & ADDITIONAL TESTS: Reviewed

## 2017-06-26 NOTE — PROGRESS NOTE ADULT - PROBLEM SELECTOR PLAN 1
Patient with multiple randomly distributed nodules differential diagnosis includes miliary spread of TB vs metastatic disease.  Less likely inhalational injury due to random pattern.  -F/U official read of CT Abd/pelvis to evaluate for GI malignancy  -For bronchoscopy with EBUS and TBBX today.

## 2017-06-26 NOTE — BRIEF OPERATIVE NOTE - OPERATION/FINDINGS
Procedure:	  1.  Bronchoscopy, diagnostic 2.  Use of linear EBUS during procedure 3.  Use of radial EBUS during procedure 4.  Bronchoscopy w/ BAL 5.  Bronchoscopy with transbronchial lung biopsies, single lobe  6.  Bronchoscopy with endobronchial biopsy       Preoperative diagnosis: The patient is a 82 year old female here for a bronchoscopy due to nodule of lung and mediastinal adenopathy      Medication: See anesthesia report.   , Lidocaine 2%   Monitoring:  Oximetry, BP, and Telemetry    Description of procedure: After the risks, benefits, and alternatives to the procedure were explained, informed consent was obtained.  The patient was anesthetized with topical anesthesia and the Olympus BFXT 160 bronchoscope was introduced through the LMA , where 12 cc Lidocaine was instilled on the vocal cords and into the trachea.  When adequate anesthesia was acheived, the bronchoscope was passed through the larynx, into the trachea.  The tracheao-bronchial tree was then examined.    FINDINGS:  No endobronchial lesion noted.  Using Olympus EBUS Scope LN stations evaluated.  LN 7 measuring 7.9 mm([id number]),  4R 8.9mm([id number])  LN 11R  9.6mm([id number])  Using 22G BS EBUS needle 7,11R and 4R biopsied.  Sample adequacy confirmed by FINA.  Scope is then wedged on RUL and multiple transbronchial biopsy done.  Endobronchial biopsy of the RUL performed.  Also BAL of RML and RUL done.  Patient tolerated the procedure well and no significant bleeding noted.  Recs: Await path, cyto, micro          -Resume diet after 2 hours

## 2017-06-26 NOTE — PROVIDER CONTACT NOTE (CHANGE IN STATUS NOTIFICATION) - ACTION/TREATMENT ORDERED:
Patient given 1L NS Bolus, Vancomycin 1000mg IV, Tylenol 650mg PO, Motrin 200mg PO, Labs drawn/ABG, CXR, 5L NC, Ice packs. MD Perales, MD Mccray, Pulmonary MD by bedside. Patient given 1L NS Bolus, Vancomycin 1000mg IV, Tylenol 650mg PO, Motrin 200mg PO, Labs drawn/ABG, CXR, 5L NC, Ice packs. Hep lock #22 left arm.

## 2017-06-26 NOTE — PROVIDER CONTACT NOTE (CHANGE IN STATUS NOTIFICATION) - SITUATION
Son of patient alerted RN of change of mental status, patient incoherent & found lethargic, VS Temp 102.8 F, , B/P 132/57 RR 20 SPO2 92% 2L NC.

## 2017-06-26 NOTE — PROGRESS NOTE ADULT - PROBLEM SELECTOR PLAN 1
Progressively worsening cough over past 6 months  .   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 Bright, f/u recs  - Plan for bronchoscopy with biopsy today  - Breo, Qvar, Flonase  - 3x AFB sputum cx  - f/u quantiferon gold TB  - f/u CT abdomen/pelvis  - hold on antibiotics at this time  - Hycodan for cough Progressively worsening cough over past 6 months  .   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 Bright, f/u recs  - Plan for bronchoscopy with biopsy today  - Breo, Qvar, Flonase  - 3x AFB sputum cx (-)  - f/u quantiferon gold TB  - f/u CT abdomen/pelvis  - hold on antibiotics at this time  - Hycodan for cough

## 2017-06-26 NOTE — PROVIDER CONTACT NOTE (CHANGE IN STATUS NOTIFICATION) - ASSESSMENT
Patient A&Ox1, incoherent/lethargic, tachycardic, & weak. VS Temp 102.8 F, , B/P 132/57 RR 20 SPO2 92% 2L NC.

## 2017-06-27 ENCOUNTER — TRANSCRIPTION ENCOUNTER (OUTPATIENT)
Age: 82
End: 2017-06-27

## 2017-06-27 DIAGNOSIS — J18.9 PNEUMONIA, UNSPECIFIED ORGANISM: ICD-10-CM

## 2017-06-27 LAB
ANION GAP SERPL CALC-SCNC: 11 MMOL/L — SIGNIFICANT CHANGE UP (ref 5–17)
BUN SERPL-MCNC: 16 MG/DL — SIGNIFICANT CHANGE UP (ref 7–23)
CALCIUM SERPL-MCNC: 8.2 MG/DL — LOW (ref 8.4–10.5)
CHLORIDE SERPL-SCNC: 100 MMOL/L — SIGNIFICANT CHANGE UP (ref 96–108)
CO2 SERPL-SCNC: 25 MMOL/L — SIGNIFICANT CHANGE UP (ref 22–31)
CREAT SERPL-MCNC: 1.1 MG/DL — SIGNIFICANT CHANGE UP (ref 0.5–1.3)
GLUCOSE SERPL-MCNC: 116 MG/DL — HIGH (ref 70–99)
GRAM STN FLD: SIGNIFICANT CHANGE UP
HCT VFR BLD CALC: 26.7 % — LOW (ref 34.5–45)
HGB BLD-MCNC: 8.3 G/DL — LOW (ref 11.5–15.5)
MAGNESIUM SERPL-MCNC: 1.7 MG/DL — SIGNIFICANT CHANGE UP (ref 1.6–2.6)
MCHC RBC-ENTMCNC: 26.3 PG — LOW (ref 27–34)
MCHC RBC-ENTMCNC: 31.1 G/DL — LOW (ref 32–36)
MCV RBC AUTO: 84.5 FL — SIGNIFICANT CHANGE UP (ref 80–100)
NIGHT BLUE STAIN TISS: SIGNIFICANT CHANGE UP
PLATELET # BLD AUTO: 102 K/UL — LOW (ref 150–400)
POTASSIUM SERPL-MCNC: 3.9 MMOL/L — SIGNIFICANT CHANGE UP (ref 3.5–5.3)
POTASSIUM SERPL-SCNC: 3.9 MMOL/L — SIGNIFICANT CHANGE UP (ref 3.5–5.3)
RBC # BLD: 3.16 M/UL — LOW (ref 3.8–5.2)
RBC # FLD: 14.6 % — SIGNIFICANT CHANGE UP (ref 10.3–16.9)
SODIUM SERPL-SCNC: 136 MMOL/L — SIGNIFICANT CHANGE UP (ref 135–145)
SPECIMEN SOURCE: SIGNIFICANT CHANGE UP
SPECIMEN SOURCE: SIGNIFICANT CHANGE UP
WBC # BLD: 6.2 K/UL — SIGNIFICANT CHANGE UP (ref 3.8–10.5)
WBC # FLD AUTO: 6.2 K/UL — SIGNIFICANT CHANGE UP (ref 3.8–10.5)

## 2017-06-27 PROCEDURE — 71010: CPT | Mod: 26

## 2017-06-27 PROCEDURE — 99233 SBSQ HOSP IP/OBS HIGH 50: CPT

## 2017-06-27 RX ORDER — VANCOMYCIN HCL 1 G
1000 VIAL (EA) INTRAVENOUS EVERY 24 HOURS
Qty: 0 | Refills: 0 | Status: DISCONTINUED | OUTPATIENT
Start: 2017-06-28 | End: 2017-06-28

## 2017-06-27 RX ORDER — AZTREONAM 2 G
1000 VIAL (EA) INJECTION EVERY 8 HOURS
Qty: 0 | Refills: 0 | Status: DISCONTINUED | OUTPATIENT
Start: 2017-06-27 | End: 2017-06-28

## 2017-06-27 RX ORDER — BENZOCAINE AND MENTHOL 5; 1 G/100ML; G/100ML
1 LIQUID ORAL
Qty: 0 | Refills: 0 | Status: DISCONTINUED | OUTPATIENT
Start: 2017-06-27 | End: 2017-06-30

## 2017-06-27 RX ORDER — MAGNESIUM SULFATE 500 MG/ML
2 VIAL (ML) INJECTION ONCE
Qty: 0 | Refills: 0 | Status: COMPLETED | OUTPATIENT
Start: 2017-06-27 | End: 2017-06-27

## 2017-06-27 RX ORDER — VANCOMYCIN HCL 1 G
1000 VIAL (EA) INTRAVENOUS EVERY 12 HOURS
Qty: 0 | Refills: 0 | Status: DISCONTINUED | OUTPATIENT
Start: 2017-06-27 | End: 2017-06-27

## 2017-06-27 RX ADMIN — Medication 3 MILLILITER(S): at 12:43

## 2017-06-27 RX ADMIN — PANTOPRAZOLE SODIUM 20 MILLIGRAM(S): 20 TABLET, DELAYED RELEASE ORAL at 17:55

## 2017-06-27 RX ADMIN — Medication 2: at 22:16

## 2017-06-27 RX ADMIN — Medication 50 MILLIGRAM(S): at 14:07

## 2017-06-27 RX ADMIN — HEPARIN SODIUM 5000 UNIT(S): 5000 INJECTION INTRAVENOUS; SUBCUTANEOUS at 22:16

## 2017-06-27 RX ADMIN — Medication 50 MILLIGRAM(S): at 22:17

## 2017-06-27 RX ADMIN — Medication 2 SPRAY(S): at 12:38

## 2017-06-27 RX ADMIN — Medication 50 GRAM(S): at 08:09

## 2017-06-27 RX ADMIN — HEPARIN SODIUM 5000 UNIT(S): 5000 INJECTION INTRAVENOUS; SUBCUTANEOUS at 05:46

## 2017-06-27 RX ADMIN — HEPARIN SODIUM 5000 UNIT(S): 5000 INJECTION INTRAVENOUS; SUBCUTANEOUS at 14:07

## 2017-06-27 RX ADMIN — Medication 3 MILLILITER(S): at 04:21

## 2017-06-27 RX ADMIN — Medication 650 MILLIGRAM(S): at 06:58

## 2017-06-27 RX ADMIN — PANTOPRAZOLE SODIUM 20 MILLIGRAM(S): 20 TABLET, DELAYED RELEASE ORAL at 07:00

## 2017-06-27 RX ADMIN — BENZOCAINE AND MENTHOL 1 LOZENGE: 5; 1 LIQUID ORAL at 14:38

## 2017-06-27 RX ADMIN — Medication 50 MILLIGRAM(S): at 05:46

## 2017-06-27 RX ADMIN — Medication 3 MILLILITER(S): at 22:16

## 2017-06-27 RX ADMIN — Medication 250 MILLIGRAM(S): at 05:46

## 2017-06-27 RX ADMIN — Medication 650 MILLIGRAM(S): at 16:22

## 2017-06-27 NOTE — DISCHARGE NOTE ADULT - ADDITIONAL INSTRUCTIONS
Please avoid showering/bathing and leave the dressing from your bone marrow biopsy in place for 24 hours. After 24 hours you may remove the dressing and return to normal bathing. Please call your primary care provider, Dr. Catrachita García, as soon as possible to make a follow up appointment in the next one to two weeks. Please bring all medications and information from this hospitalization with you to the appointment. Dr. García's number is [phone].    Please avoid showering/bathing and leave the dressing from your bone marrow biopsy in place for 24 hours. After 24 hours you may remove the dressing and return to normal bathing.

## 2017-06-27 NOTE — PROGRESS NOTE ADULT - SUBJECTIVE AND OBJECTIVE BOX
INTERVAL HPI/OVERNIGHT EVENTS:    VITAL SIGNS:  T(F): 98.4 (17 @ 09:30)  HR: 73 (17 @ 09:30)  BP: 103/62 (17 @ 09:30)  RR: 18 (17 @ 09:30)  SpO2: 97% (17 @ 09:30)  Wt(kg): --    PHYSICAL EXAM:    Constitutional:  Eyes:  ENMT:  Neck:  Respiratory:  Cardiovascular:  Gastrointestinal:  Extremities:  Vascular:  Neurological:  Musculoskeletal:    MEDICATIONS  (STANDING):  heparin  Injectable 5000 Unit(s) SubCutaneous every 8 hours  insulin lispro (HumaLOG) corrective regimen sliding scale   SubCutaneous Before meals and at bedtime  pantoprazole    Tablet 20 milliGRAM(s) Oral two times a day before meals  fluticasone propionate 50 MICROgram(s)/spray Nasal Spray 2 Spray(s) Both Nostrils daily  ALBUTerol/ipratropium for Nebulization 3 milliLiter(s) Nebulizer every 6 hours  vancomycin  IVPB 1000 milliGRAM(s) IV Intermittent every 12 hours  aztreonam  IVPB 1000 milliGRAM(s) IV Intermittent every 8 hours    MEDICATIONS  (PRN):  HYDROcodone/homatropine Syrup 5 milliLiter(s) Oral every 6 hours PRN worse Cough  acetaminophen   Tablet 650 milliGRAM(s) Oral every 6 hours PRN For Temp greater than 38 C (100.4 F)      Allergies    penicillin (Rash)    Intolerances        LABS:                        8.3    6.2   )-----------( 102      ( 2017 06:53 )             26.7         136  |  100  |  16  ----------------------------<  116<H>  3.9   |  25  |  1.10    Ca    8.2<L>      2017 06:53  Phos  3.1       Mg     1.7         TPro  7.0  /  Alb  3.4  /  TBili  0.3  /  DBili  <0.2  /  AST  19  /  ALT  10  /  AlkPhos  79      PT/INR - ( 2017 19:20 )   PT: 12.4 sec;   INR: 1.11          PTT - ( 2017 19:20 )  PTT:29.5 sec  Urinalysis Basic - ( 2017 21:59 )    Color: Yellow / Appearance: Clear / S.015 / pH: x  Gluc: x / Ketone: NEGATIVE  / Bili: NEGATIVE / Urobili: 0.2 E.U./dL   Blood: x / Protein: Trace mg/dL / Nitrite: NEGATIVE   Leuk Esterase: NEGATIVE / RBC: < 5 /HPF / WBC < 5 /HPF   Sq Epi: x / Non Sq Epi: Moderate /HPF / Bacteria: Present /HPF        RADIOLOGY & ADDITIONAL TESTS: Reviewed INTERVAL HPI/OVERNIGHT EVENTS:  Febrile overnight, documented in chart note. Persistent cough and fevers this morning.    VITAL SIGNS:  T(F): 98.4 (17 @ 09:30)  HR: 73 (17 @ 09:30)  BP: 103/62 (17 @ 09:30)  RR: 18 (17 @ 09:30)  SpO2: 97% (17 @ 09:30)  Wt(kg): --    PHYSICAL EXAM:    Constitutional: appears well, MAD  Eyes: PERRL, EOMI  ENMT: MMM  Neck: supple  Respiratory: R sided crackles with scattered expiratory rhonchi  Cardiovascular: RRR, s1 s2nml,   Gastrointestinal: soft, NTND, normal BS  Extremities: no edema  Vascular: WWP  Neurological: A&Ox3, 5/5 str all 4 ext, sensation intact bl, normal strength and sensation    MEDICATIONS  (STANDING):  heparin  Injectable 5000 Unit(s) SubCutaneous every 8 hours  insulin lispro (HumaLOG) corrective regimen sliding scale   SubCutaneous Before meals and at bedtime  pantoprazole    Tablet 20 milliGRAM(s) Oral two times a day before meals  fluticasone propionate 50 MICROgram(s)/spray Nasal Spray 2 Spray(s) Both Nostrils daily  ALBUTerol/ipratropium for Nebulization 3 milliLiter(s) Nebulizer every 6 hours  vancomycin  IVPB 1000 milliGRAM(s) IV Intermittent every 12 hours  aztreonam  IVPB 1000 milliGRAM(s) IV Intermittent every 8 hours    MEDICATIONS  (PRN):  HYDROcodone/homatropine Syrup 5 milliLiter(s) Oral every 6 hours PRN worse Cough  acetaminophen   Tablet 650 milliGRAM(s) Oral every 6 hours PRN For Temp greater than 38 C (100.4 F)      Allergies    penicillin (Rash)    Intolerances        LABS:                        8.3    6.2   )-----------( 102      ( 2017 06:53 )             26.7     -    136  |  100  |  16  ----------------------------<  116<H>  3.9   |  25  |  1.10    Ca    8.2<L>      2017 06:53  Phos  3.1     -  Mg     1.7         TPro  7.0  /  Alb  3.4  /  TBili  0.3  /  DBili  <0.2  /  AST  19  /  ALT  10  /  AlkPhos  79      PT/INR - ( 2017 19:20 )   PT: 12.4 sec;   INR: 1.11          PTT - ( 2017 19:20 )  PTT:29.5 sec  Urinalysis Basic - ( 2017 21:59 )    Color: Yellow / Appearance: Clear / S.015 / pH: x  Gluc: x / Ketone: NEGATIVE  / Bili: NEGATIVE / Urobili: 0.2 E.U./dL   Blood: x / Protein: Trace mg/dL / Nitrite: NEGATIVE   Leuk Esterase: NEGATIVE / RBC: < 5 /HPF / WBC < 5 /HPF   Sq Epi: x / Non Sq Epi: Moderate /HPF / Bacteria: Present /HPF        RADIOLOGY & ADDITIONAL TESTS: Reviewed INTERVAL HPI/OVERNIGHT EVENTS:  Febrile overnight, documented in chart note. Persistent cough and fevers this morning. mild dyspnea, but improved from overnight.     ROS  (- ) headache  ( - ) dyspnea  (  - ) chest pain  (  - ) palpatations  ( - ) dizziness/lightheadedness  (  - ) nausea/vomiting  (  - ) abd pain  (  - ) diarrhea  (  - ) melena  (  - ) hematochezia  (  - ) dysuria   ( - ) hematuria  (  - ) leg swelling    ( - ) calf tenderness  (  - ) motor weakness  ( - ) extremity numbness  ( - ) back pain  ( + ) tolerating POs  ( + ) BM  ROS: 12 point review of systems otherwise negative           VITAL SIGNS:  T(F): 98.4 (17 @ 09:30)  HR: 73 (17 @ 09:30)  BP: 103/62 (17 @ 09:30)  RR: 18 (17 @ 09:30)  SpO2: 97% (17 @ 09:30)  Wt(kg): --    PHYSICAL EXAM:    Constitutional: appears well, MAD  Eyes: PERRL, EOMI  ENMT: MMM  Neck: supple  Respiratory:scant bibasilar rales, with scattered expiratory rhonchi  Cardiovascular: RRR, s1 s2nml,   Gastrointestinal: soft, NTND, normal BS  Extremities: no edema  Vascular: WWP  Neurological: A&Ox3, 5/5 str all 4 ext, sensation intact bl, normal strength and sensation    MEDICATIONS  (STANDING):  heparin  Injectable 5000 Unit(s) SubCutaneous every 8 hours  insulin lispro (HumaLOG) corrective regimen sliding scale   SubCutaneous Before meals and at bedtime  pantoprazole    Tablet 20 milliGRAM(s) Oral two times a day before meals  fluticasone propionate 50 MICROgram(s)/spray Nasal Spray 2 Spray(s) Both Nostrils daily  ALBUTerol/ipratropium for Nebulization 3 milliLiter(s) Nebulizer every 6 hours  vancomycin  IVPB 1000 milliGRAM(s) IV Intermittent every 12 hours  aztreonam  IVPB 1000 milliGRAM(s) IV Intermittent every 8 hours    MEDICATIONS  (PRN):  HYDROcodone/homatropine Syrup 5 milliLiter(s) Oral every 6 hours PRN worse Cough  acetaminophen   Tablet 650 milliGRAM(s) Oral every 6 hours PRN For Temp greater than 38 C (100.4 F)      Allergies    penicillin (Rash)    Intolerances        LABS:                        8.3    6.2   )-----------( 102      ( 2017 06:53 )             26.7         136  |  100  |  16  ----------------------------<  116<H>  3.9   |  25  |  1.10    Ca    8.2<L>      2017 06:53  Phos  3.1       Mg     1.7         TPro  7.0  /  Alb  3.4  /  TBili  0.3  /  DBili  <0.2  /  AST  19  /  ALT  10  /  AlkPhos  79      PT/INR - ( 2017 19:20 )   PT: 12.4 sec;   INR: 1.11          PTT - ( 2017 19:20 )  PTT:29.5 sec  Urinalysis Basic - ( 2017 21:59 )    Color: Yellow / Appearance: Clear / S.015 / pH: x  Gluc: x / Ketone: NEGATIVE  / Bili: NEGATIVE / Urobili: 0.2 E.U./dL   Blood: x / Protein: Trace mg/dL / Nitrite: NEGATIVE   Leuk Esterase: NEGATIVE / RBC: < 5 /HPF / WBC < 5 /HPF   Sq Epi: x / Non Sq Epi: Moderate /HPF / Bacteria: Present /HPF        RADIOLOGY & ADDITIONAL TESTS: Reviewed

## 2017-06-27 NOTE — DISCHARGE NOTE ADULT - MEDICATION SUMMARY - MEDICATIONS TO STOP TAKING
I will STOP taking the medications listed below when I get home from the hospital:    amLODIPine 5 mg oral tablet  -- 1 tab(s) by mouth once a day    hydroCHLOROthiazide 12.5 mg oral tablet  -- 1 tab(s) by mouth once a day    amLODIPine 10 mg oral tablet  -- 1 tab(s) by mouth once a day

## 2017-06-27 NOTE — PROGRESS NOTE ADULT - PROBLEM SELECTOR PLAN 1
Severe Sepsis with fever tachycardia and AMS last night. Concern for developing pneumonia after bronchoscopy with hypoxia and RLL infiltrate developing on CXR  - Vanc/Aztreonam given penicillin allergy and >3day admission requires HCAP coverage  - tylenol and fluids for fever Severe Sepsis with fever tachycardia and AMS last night. Concern for developing pneumonia after bronchoscopy with hypoxia and possible RLL infiltrate developing on CXR  - Vanc/Aztreonam (penicillin allergy)    - tylenol and fluids for fever  f/u blood cxs  f/u BAL cxs

## 2017-06-27 NOTE — PROGRESS NOTE ADULT - PROBLEM SELECTOR PLAN 2
Progressively worsening cough over past 6 months  .   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   - Pulmonary Consulted,  f/u recs  - d/c Breo and Qvar  - Flonase  - 3x AFB sputum cx (-)  - f/u quantiferon gold TB   - Hycodan for cough

## 2017-06-27 NOTE — DISCHARGE NOTE ADULT - MEDICATION SUMMARY - MEDICATIONS TO TAKE
I will START or STAY ON the medications listed below when I get home from the hospital:    metFORMIN 500 mg oral tablet  -- 1 tab(s) by mouth 2 times a day  -- Indication: For Diabetes    benzonatate 200 mg oral capsule  -- 1 cap(s) by mouth 3 times a day, As Needed  -- Indication: For Cough    ProAir HFA 90 mcg/inh inhalation aerosol  -- 2 puff(s) inhaled every 4 hours  -- For inhalation only.  It is very important that you take or use this exactly as directed.  Do not skip doses or discontinue unless directed by your doctor.  Obtain medical advice before taking any non-prescription drugs as some may affect the action of this medication.  Shake well before use.    -- Indication: For Cough    azithromycin 250 mg oral tablet  -- 1 tab(s) by mouth once a day  -- Indication: For Pneumonia of right lung due to infectious organism, unspecified part of lung    ipratropium 21 mcg/inh (0.03%) nasal spray  -- 2 spray(s) into nose 3 times a day  -- Indication: For Cough    omeprazole 40 mg oral delayed release capsule  -- 1 cap(s) by mouth once a day  -- Indication: For Gastroesophageal reflux disease without esophagitis    fluticasone 0.5 mg/2 mL inhalation suspension  --  inhaled   -- Indication: For Cough    homatropine-hydrocodone 1.5 mg-5 mg/5 mL oral syrup  -- 5 milliliter(s) by mouth every 6 hours, As needed, worse Cough  -- Indication: For Cough    calcium-vitamin D 600 mg-500 intl units oral tablet, extended release  -- 2 tab(s) by mouth once a day (in the morning)  -- Indication: For Vitamin D levels

## 2017-06-27 NOTE — DISCHARGE NOTE ADULT - PATIENT PORTAL LINK FT
“You can access the [employer] Patient Portal, offered by [facility], by registering with the following website: [email or web address]”

## 2017-06-27 NOTE — PROVIDER CONTACT NOTE (OTHER) - ASSESSMENT
Patient's son rang the call bell and asked if we could take temperature. Temp 99.9 F orally. Patient reports feeling cold and "not well". MD Perales notified.

## 2017-06-27 NOTE — DISCHARGE NOTE ADULT - CARE PROVIDER_API CALL
Catrachita García (DO), [place] [address]  Phone: [phone]  Fax: [phone]

## 2017-06-27 NOTE — DISCHARGE NOTE ADULT - PLAN OF CARE
Diagnose and treat pulmonary nodules You were seen for the evaluation of pulmonary nodules that may be contributing to your cough. We admitted you to the hospital in order to rule out tuberculosis. You have tested negative for tuberculosis, but it is important to follow up as an outpatient to determine the cause of your cough. At this point you may resume normal diet and activities as tolerated. IMPORTANT: You must avoid showering/bathing and leave the dressing from your bone marrow biopsy in place for 24 hours. After 24 hours you may remove the dressing and return to normal bathing. Determine cause of pancytopenia We found you to have low blood cell levels. While we have successfully ruled out tuberculosis as a cause of the low counts, it is important that you follow up as an outpatient to determine the cause. You had a bone marrow biopsy today which may lend further information. IMPORTANT: You must avoid showering/bathing and leave the dressing from your bone marrow biopsy in place for 24 hours. After 24 hours you may remove the dressing and return to normal bathing. Treat pneumonia During this hospitalization you began treatment for a mild pneumonia. While this may be contributing to your cough, there may be another cause, which is what your follow-up appointments will help determine. In order to complete the course of treatment for your pneumonia, please continue taking Azithromycin 250 mg once daily as prescribed, for two more days. Control chronic kidney disease Your creatinine Control blood sugar Your diabetes was well-controlled during the course of your hospital stay. Please continue to treat your diabetes with your prescription medications (Metformin), and also follow up with your physician as an outpatient. Control blood pressure Your blood pressure was well-controlled during the course of your hospital stay. Please continue to treat your blood pressure with your prescription medications (Amlodipine and Hydrochlorothiazide), and also follow up with your physician as an outpatient. Control symptoms of reflux Your reflux was well-controlled during the course of your hospital stay. Please continue to treat using your prescription medication (Omeprazole), and also follow up with your physician as an outpatient. You were seen for the evaluation of pulmonary nodules that may be contributing to your cough. We admitted you to the hospital in order to rule out tuberculosis. You have tested negative for tuberculosis, but it is important to follow up as an outpatient to determine the cause of your cough. At this point you may resume normal diet and activities as tolerated. Please avoid showering/bathing and leave the dressing from your bone marrow biopsy in place for 24 hours. After 24 hours you may remove the dressing and return to normal bathing. We found you to have low blood cell levels. While we have successfully ruled out tuberculosis as a cause of the low counts, it is important that you follow up as an outpatient to determine the cause. You had a bone marrow biopsy today which may lend further information. Please avoid showering/bathing and leave the dressing from your bone marrow biopsy in place for 24 hours. After 24 hours you may remove the dressing and return to normal bathing. Your kidney markers (creatinine) were elevated during this hospital stay, however we monitored and they remained stable. Please discuss with your doctor when you follow up as an outpatient. Your blood pressure was well-controlled during the course of your hospital stay. As your blood pressure has been controlled in the hospital without the use of medication, we have stopped your prescriptions. Please stop taking Amlodipine and Hydrochlorothiazide, and also follow up with your physician as an outpatient.

## 2017-06-27 NOTE — DISCHARGE NOTE ADULT - HOSPITAL COURSE
82F PMHx HTN, HLD, DMII, GERD, CKD stage IIIb, Anemia (unknown cause, s/p BM biopsy 1 yr ago) presents to [facility] complaining of worsening cough over the past six months. CT chest concerning for multiple pulmonary nodules consistent with miliary TB vs. malignancy. AFB sputume negative x3. Bronchoscopy performed with biopsy. Patient became acutely febrile with hypoxia after bronchoscopy. Concern for post procedural pneumonia, patient started on Vanc/Aztreonam. 82F PMHx HTN, HLD, DMII, GERD, CKD stage IIIb, Anemia (unknown cause, s/p BM biopsy 1 yr ago) presents to [facility] complaining of worsening cough over the past six months. CT chest concerning for multiple pulmonary nodules consistent with miliary TB vs. malignancy vs. sarcoidosis. Bronchoscopy performed with biopsy. AFB sputum negative x3, BAL AFB negative, Quantiferon negative, and PPD negative. Biopsy tissue was nondiagnostic. Patient became acutely febrile with hypoxia after bronchoscopy and was started on empiric antibiotics for HCAP. BAL cultures revealed H. influenzae and pt was treated w/ 5 day course of Azithromycin.  Patient continued spiking intermittent mild fevers without signs of sepsis, suggesting etiology related to imaging findings of multiple small nodules in the lungs and spleen. DDx narrowed to malignancy vs. sarcoidosis, bone marrow biopsy was performed, and patient was deemed stable for discharge.

## 2017-06-27 NOTE — DISCHARGE NOTE ADULT - CARE PLAN
Principal Discharge DX:	Tuberculosis  Secondary Diagnosis:	Pneumonia of right lung due to infectious organism, unspecified part of lung  Secondary Diagnosis:	Essential hypertension  Secondary Diagnosis:	Type 2 diabetes mellitus with complication, without long-term current use of insulin Principal Discharge DX:	Pulmonary nodules/lesions, multiple  Goal:	Diagnose and treat pulmonary nodules  Instructions for follow-up, activity and diet:	You were seen for the evaluation of pulmonary nodules that may be contributing to your cough. We admitted you to the hospital in order to rule out tuberculosis. You have tested negative for tuberculosis, but it is important to follow up as an outpatient to determine the cause of your cough. At this point you may resume normal diet and activities as tolerated. IMPORTANT: You must avoid showering/bathing and leave the dressing from your bone marrow biopsy in place for 24 hours. After 24 hours you may remove the dressing and return to normal bathing.  Secondary Diagnosis:	Pancytopenia  Goal:	Determine cause of pancytopenia  Instructions for follow-up, activity and diet:	We found you to have low blood cell levels. While we have successfully ruled out tuberculosis as a cause of the low counts, it is important that you follow up as an outpatient to determine the cause. You had a bone marrow biopsy today which may lend further information. IMPORTANT: You must avoid showering/bathing and leave the dressing from your bone marrow biopsy in place for 24 hours. After 24 hours you may remove the dressing and return to normal bathing.  Secondary Diagnosis:	Pneumonia of right lung due to infectious organism, unspecified part of lung  Goal:	Treat pneumonia  Instructions for follow-up, activity and diet:	During this hospitalization you began treatment for a mild pneumonia. While this may be contributing to your cough, there may be another cause, which is what your follow-up appointments will help determine. In order to complete the course of treatment for your pneumonia, please continue taking Azithromycin 250 mg once daily as prescribed, for two more days.  Secondary Diagnosis:	CKD (chronic kidney disease) stage 3, GFR 30-59 ml/min  Goal:	Control chronic kidney disease  Instructions for follow-up, activity and diet:	Your creatinine  Secondary Diagnosis:	Type 2 diabetes mellitus with complication, without long-term current use of insulin  Secondary Diagnosis:	Essential hypertension  Secondary Diagnosis:	Gastroesophageal reflux disease without esophagitis Principal Discharge DX:	Pulmonary nodules/lesions, multiple  Goal:	Diagnose and treat pulmonary nodules  Instructions for follow-up, activity and diet:	You were seen for the evaluation of pulmonary nodules that may be contributing to your cough. We admitted you to the hospital in order to rule out tuberculosis. You have tested negative for tuberculosis, but it is important to follow up as an outpatient to determine the cause of your cough. At this point you may resume normal diet and activities as tolerated. Please avoid showering/bathing and leave the dressing from your bone marrow biopsy in place for 24 hours. After 24 hours you may remove the dressing and return to normal bathing.  Secondary Diagnosis:	Pancytopenia  Goal:	Determine cause of pancytopenia  Instructions for follow-up, activity and diet:	We found you to have low blood cell levels. While we have successfully ruled out tuberculosis as a cause of the low counts, it is important that you follow up as an outpatient to determine the cause. You had a bone marrow biopsy today which may lend further information. Please avoid showering/bathing and leave the dressing from your bone marrow biopsy in place for 24 hours. After 24 hours you may remove the dressing and return to normal bathing.  Secondary Diagnosis:	Pneumonia of right lung due to infectious organism, unspecified part of lung  Goal:	Treat pneumonia  Instructions for follow-up, activity and diet:	During this hospitalization you began treatment for a mild pneumonia. While this may be contributing to your cough, there may be another cause, which is what your follow-up appointments will help determine. In order to complete the course of treatment for your pneumonia, please continue taking Azithromycin 250 mg once daily as prescribed, for two more days.  Secondary Diagnosis:	CKD (chronic kidney disease) stage 3, GFR 30-59 ml/min  Goal:	Control chronic kidney disease  Instructions for follow-up, activity and diet:	Your kidney markers (creatinine) were elevated during this hospital stay, however we monitored and they remained stable. Please discuss with your doctor when you follow up as an outpatient.  Secondary Diagnosis:	Type 2 diabetes mellitus with complication, without long-term current use of insulin  Goal:	Control blood sugar  Instructions for follow-up, activity and diet:	Your diabetes was well-controlled during the course of your hospital stay. Please continue to treat your diabetes with your prescription medications (Metformin), and also follow up with your physician as an outpatient.  Secondary Diagnosis:	Essential hypertension  Goal:	Control blood pressure  Instructions for follow-up, activity and diet:	Your blood pressure was well-controlled during the course of your hospital stay. Please continue to treat your blood pressure with your prescription medications (Amlodipine and Hydrochlorothiazide), and also follow up with your physician as an outpatient.  Secondary Diagnosis:	Gastroesophageal reflux disease without esophagitis  Goal:	Control symptoms of reflux  Instructions for follow-up, activity and diet:	Your reflux was well-controlled during the course of your hospital stay. Please continue to treat using your prescription medication (Omeprazole), and also follow up with your physician as an outpatient. Principal Discharge DX:	Pulmonary nodules/lesions, multiple  Goal:	Diagnose and treat pulmonary nodules  Instructions for follow-up, activity and diet:	You were seen for the evaluation of pulmonary nodules that may be contributing to your cough. We admitted you to the hospital in order to rule out tuberculosis. You have tested negative for tuberculosis, but it is important to follow up as an outpatient to determine the cause of your cough. At this point you may resume normal diet and activities as tolerated. Please avoid showering/bathing and leave the dressing from your bone marrow biopsy in place for 24 hours. After 24 hours you may remove the dressing and return to normal bathing.  Secondary Diagnosis:	Pancytopenia  Goal:	Determine cause of pancytopenia  Instructions for follow-up, activity and diet:	We found you to have low blood cell levels. While we have successfully ruled out tuberculosis as a cause of the low counts, it is important that you follow up as an outpatient to determine the cause. You had a bone marrow biopsy today which may lend further information. Please avoid showering/bathing and leave the dressing from your bone marrow biopsy in place for 24 hours. After 24 hours you may remove the dressing and return to normal bathing.  Secondary Diagnosis:	Pneumonia of right lung due to infectious organism, unspecified part of lung  Goal:	Treat pneumonia  Instructions for follow-up, activity and diet:	During this hospitalization you began treatment for a mild pneumonia. While this may be contributing to your cough, there may be another cause, which is what your follow-up appointments will help determine. In order to complete the course of treatment for your pneumonia, please continue taking Azithromycin 250 mg once daily as prescribed, for two more days.  Secondary Diagnosis:	CKD (chronic kidney disease) stage 3, GFR 30-59 ml/min  Goal:	Control chronic kidney disease  Instructions for follow-up, activity and diet:	Your kidney markers (creatinine) were elevated during this hospital stay, however we monitored and they remained stable. Please discuss with your doctor when you follow up as an outpatient.  Secondary Diagnosis:	Type 2 diabetes mellitus with complication, without long-term current use of insulin  Goal:	Control blood sugar  Instructions for follow-up, activity and diet:	Your diabetes was well-controlled during the course of your hospital stay. Please continue to treat your diabetes with your prescription medications (Metformin), and also follow up with your physician as an outpatient.  Secondary Diagnosis:	Essential hypertension  Goal:	Control blood pressure  Instructions for follow-up, activity and diet:	Your blood pressure was well-controlled during the course of your hospital stay. As your blood pressure has been controlled in the hospital without the use of medication, we have stopped your prescriptions. Please stop taking Amlodipine and Hydrochlorothiazide, and also follow up with your physician as an outpatient.  Secondary Diagnosis:	Gastroesophageal reflux disease without esophagitis  Goal:	Control symptoms of reflux  Instructions for follow-up, activity and diet:	Your reflux was well-controlled during the course of your hospital stay. Please continue to treat using your prescription medication (Omeprazole), and also follow up with your physician as an outpatient.

## 2017-06-27 NOTE — DISCHARGE NOTE ADULT - SECONDARY DIAGNOSIS.
Pneumonia of right lung due to infectious organism, unspecified part of lung Essential hypertension Type 2 diabetes mellitus with complication, without long-term current use of insulin Gastroesophageal reflux disease without esophagitis Pancytopenia CKD (chronic kidney disease) stage 3, GFR 30-59 ml/min

## 2017-06-28 LAB
ANION GAP SERPL CALC-SCNC: 10 MMOL/L — SIGNIFICANT CHANGE UP (ref 5–17)
BUN SERPL-MCNC: 14 MG/DL — SIGNIFICANT CHANGE UP (ref 7–23)
CALCIUM SERPL-MCNC: 8.5 MG/DL — SIGNIFICANT CHANGE UP (ref 8.4–10.5)
CHLORIDE SERPL-SCNC: 99 MMOL/L — SIGNIFICANT CHANGE UP (ref 96–108)
CO2 SERPL-SCNC: 25 MMOL/L — SIGNIFICANT CHANGE UP (ref 22–31)
CREAT SERPL-MCNC: 1.1 MG/DL — SIGNIFICANT CHANGE UP (ref 0.5–1.3)
CULTURE RESULTS: SIGNIFICANT CHANGE UP
FUNGITELL B-D-GLUCAN,  BRONCHIAL WASH: SIGNIFICANT CHANGE UP
GALACTOMANNAN AG SPEC IA-ACNC: <0.5 INDEX — SIGNIFICANT CHANGE UP
GLUCOSE SERPL-MCNC: 166 MG/DL — HIGH (ref 70–99)
HCT VFR BLD CALC: 24.8 % — LOW (ref 34.5–45)
HGB BLD-MCNC: 7.4 G/DL — LOW (ref 11.5–15.5)
M TB TUBERC IFN-G BLD QL: 0 IU/ML — SIGNIFICANT CHANGE UP
M TB TUBERC IFN-G BLD QL: 0.05 IU/ML — SIGNIFICANT CHANGE UP
M TB TUBERC IFN-G BLD QL: NEGATIVE — SIGNIFICANT CHANGE UP
MAGNESIUM SERPL-MCNC: 2.2 MG/DL — SIGNIFICANT CHANGE UP (ref 1.6–2.6)
MCHC RBC-ENTMCNC: 25.2 PG — LOW (ref 27–34)
MCHC RBC-ENTMCNC: 29.8 G/DL — LOW (ref 32–36)
MCV RBC AUTO: 84.4 FL — SIGNIFICANT CHANGE UP (ref 80–100)
MITOGEN IGNF BCKGRD COR BLD-ACNC: 0.53 IU/ML — SIGNIFICANT CHANGE UP
NIGHT BLUE STAIN TISS: SIGNIFICANT CHANGE UP
NIGHT BLUE STAIN TISS: SIGNIFICANT CHANGE UP
PLATELET # BLD AUTO: 98 K/UL — LOW (ref 150–400)
POTASSIUM SERPL-MCNC: 3.7 MMOL/L — SIGNIFICANT CHANGE UP (ref 3.5–5.3)
POTASSIUM SERPL-SCNC: 3.7 MMOL/L — SIGNIFICANT CHANGE UP (ref 3.5–5.3)
PROCALCITONIN SERPL-MCNC: 0.37 NG/ML — HIGH (ref 0–0.04)
RBC # BLD: 2.94 M/UL — LOW (ref 3.8–5.2)
RBC # FLD: 14.6 % — SIGNIFICANT CHANGE UP (ref 10.3–16.9)
SODIUM SERPL-SCNC: 134 MMOL/L — LOW (ref 135–145)
SPECIMEN SOURCE: SIGNIFICANT CHANGE UP
SURGICAL PATHOLOGY STUDY: SIGNIFICANT CHANGE UP
WBC # BLD: 3.7 K/UL — LOW (ref 3.8–10.5)
WBC # FLD AUTO: 3.7 K/UL — LOW (ref 3.8–10.5)

## 2017-06-28 PROCEDURE — 99233 SBSQ HOSP IP/OBS HIGH 50: CPT | Mod: GC

## 2017-06-28 PROCEDURE — 99232 SBSQ HOSP IP/OBS MODERATE 35: CPT

## 2017-06-28 RX ORDER — TUBERCULIN PURIFIED PROTEIN DERIVATIVE 5 [IU]/.1ML
5 INJECTION, SOLUTION INTRADERMAL ONCE
Qty: 0 | Refills: 0 | Status: COMPLETED | OUTPATIENT
Start: 2017-06-28 | End: 2017-06-28

## 2017-06-28 RX ORDER — AZITHROMYCIN 500 MG/1
500 TABLET, FILM COATED ORAL ONCE
Qty: 0 | Refills: 0 | Status: COMPLETED | OUTPATIENT
Start: 2017-06-28 | End: 2017-06-28

## 2017-06-28 RX ORDER — AZITHROMYCIN 500 MG/1
250 TABLET, FILM COATED ORAL DAILY
Qty: 0 | Refills: 0 | Status: DISCONTINUED | OUTPATIENT
Start: 2017-06-29 | End: 2017-06-30

## 2017-06-28 RX ADMIN — Medication 2: at 08:49

## 2017-06-28 RX ADMIN — HEPARIN SODIUM 5000 UNIT(S): 5000 INJECTION INTRAVENOUS; SUBCUTANEOUS at 06:28

## 2017-06-28 RX ADMIN — Medication 3 MILLILITER(S): at 04:00

## 2017-06-28 RX ADMIN — TUBERCULIN PURIFIED PROTEIN DERIVATIVE 5 UNIT(S): 5 INJECTION, SOLUTION INTRADERMAL at 14:15

## 2017-06-28 RX ADMIN — PANTOPRAZOLE SODIUM 20 MILLIGRAM(S): 20 TABLET, DELAYED RELEASE ORAL at 17:38

## 2017-06-28 RX ADMIN — Medication 3 MILLILITER(S): at 21:54

## 2017-06-28 RX ADMIN — Medication 2: at 21:54

## 2017-06-28 RX ADMIN — Medication 3 MILLILITER(S): at 11:28

## 2017-06-28 RX ADMIN — AZITHROMYCIN 500 MILLIGRAM(S): 500 TABLET, FILM COATED ORAL at 15:45

## 2017-06-28 RX ADMIN — PANTOPRAZOLE SODIUM 20 MILLIGRAM(S): 20 TABLET, DELAYED RELEASE ORAL at 06:27

## 2017-06-28 RX ADMIN — Medication 2 SPRAY(S): at 11:28

## 2017-06-28 RX ADMIN — HEPARIN SODIUM 5000 UNIT(S): 5000 INJECTION INTRAVENOUS; SUBCUTANEOUS at 21:54

## 2017-06-28 RX ADMIN — Medication 50 MILLIGRAM(S): at 06:27

## 2017-06-28 RX ADMIN — Medication 3 MILLILITER(S): at 15:47

## 2017-06-28 RX ADMIN — Medication 250 MILLIGRAM(S): at 06:27

## 2017-06-28 NOTE — PROGRESS NOTE ADULT - PROBLEM SELECTOR PLAN 1
Differential as above.  BAL smear for AFB negative.  Can remove respiratory isolation.  D/C Vancomycin and Zosyn.  5 days of Azithromycin for Haemophilus.    -Quantiferon may be false negative because machine running the Quantiferon was broken at the facility.  Quantiferon should be rechecked and PPD should be placed today.    -Will review pathology with pathologist tomorrow.  If negative will recommend bone marrow biopsy.

## 2017-06-28 NOTE — PROGRESS NOTE ADULT - SUBJECTIVE AND OBJECTIVE BOX
INTERVAL HPI/OVERNIGHT EVENTS:    VITAL SIGNS:  T(F): 97.6 (17 @ 11:30)  HR: 77 (17 @ 11:30)  BP: 123/80 (17 @ 11:30)  RR: 18 (17 @ 11:30)  SpO2: 96% (17 @ 11:30)  Wt(kg): --    PHYSICAL EXAM:    Constitutional:  Eyes:  ENMT:  Neck:  Respiratory:  Cardiovascular:  Gastrointestinal:  Extremities:  Vascular:  Neurological:  Musculoskeletal:    MEDICATIONS  (STANDING):  heparin  Injectable 5000 Unit(s) SubCutaneous every 8 hours  insulin lispro (HumaLOG) corrective regimen sliding scale   SubCutaneous Before meals and at bedtime  pantoprazole    Tablet 20 milliGRAM(s) Oral two times a day before meals  fluticasone propionate 50 MICROgram(s)/spray Nasal Spray 2 Spray(s) Both Nostrils daily  ALBUTerol/ipratropium for Nebulization 3 milliLiter(s) Nebulizer every 6 hours  PPD  5 Tuberculin Unit(s) Injectable 5 Unit(s) IntraDermal once    MEDICATIONS  (PRN):  HYDROcodone/homatropine Syrup 5 milliLiter(s) Oral every 6 hours PRN worse Cough  acetaminophen   Tablet 650 milliGRAM(s) Oral every 6 hours PRN For Temp greater than 38 C (100.4 F)  benzocaine 15 mG/menthol 3.6 mG Lozenge 1 Lozenge Oral every 2 hours PRN Sore Throat      Allergies    penicillin (Rash)    Intolerances        LABS:                        7.4    3.7   )-----------( 98       ( 2017 08:14 )             24.8         134<L>  |  99  |  14  ----------------------------<  166<H>  3.7   |  25  |  1.10    Ca    8.5      2017 08:14  Phos  3.1       Mg     2.2         TPro  7.0  /  Alb  3.4  /  TBili  0.3  /  DBili  <0.2  /  AST  19  /  ALT  10  /  AlkPhos  79      PT/INR - ( 2017 19:20 )   PT: 12.4 sec;   INR: 1.11          PTT - ( 2017 19:20 )  PTT:29.5 sec  Urinalysis Basic - ( 2017 21:59 )    Color: Yellow / Appearance: Clear / S.015 / pH: x  Gluc: x / Ketone: NEGATIVE  / Bili: NEGATIVE / Urobili: 0.2 E.U./dL   Blood: x / Protein: Trace mg/dL / Nitrite: NEGATIVE   Leuk Esterase: NEGATIVE / RBC: < 5 /HPF / WBC < 5 /HPF   Sq Epi: x / Non Sq Epi: Moderate /HPF / Bacteria: Present /HPF        RADIOLOGY & ADDITIONAL TESTS: INTERVAL HPI/OVERNIGHT EVENTS: No overnight events. Patient is no longer febrile. Cough persists and is the patient's only complaint. Reports sleeping overnight and some appetite, but decreased from baseline.    VITAL SIGNS:  T(F): 97.6 (17 @ 11:30)  HR: 77 (17 @ 11:30)  BP: 123/80 (17 @ 11:30)  RR: 18 (17 @ 11:30)  SpO2: 96% (17 @ 11:30)  Wt(kg): --    PHYSICAL EXAM:    Constitutional: WGWN, AOx3, son was present  Eyes: PERRL, EOMI, no conjunctival injection  ENMT: no pharyngeal exudate, MMM  Neck: supple, nontender, no LAD  Respiratory: good respiratory effort and air movement, L lung clear in all fields, R lung with expiratory rhonchi diffusely  Cardiovascular: RRR, no MRG, normal S1S2  Gastrointestinal: abdomen soft, nontender, BS+, nontender to palpation  Extremities:   Vascular:  Neurological:  Musculoskeletal:    MEDICATIONS  (STANDING):  heparin  Injectable 5000 Unit(s) SubCutaneous every 8 hours  insulin lispro (HumaLOG) corrective regimen sliding scale   SubCutaneous Before meals and at bedtime  pantoprazole    Tablet 20 milliGRAM(s) Oral two times a day before meals  fluticasone propionate 50 MICROgram(s)/spray Nasal Spray 2 Spray(s) Both Nostrils daily  ALBUTerol/ipratropium for Nebulization 3 milliLiter(s) Nebulizer every 6 hours  PPD  5 Tuberculin Unit(s) Injectable 5 Unit(s) IntraDermal once    MEDICATIONS  (PRN):  HYDROcodone/homatropine Syrup 5 milliLiter(s) Oral every 6 hours PRN worse Cough  acetaminophen   Tablet 650 milliGRAM(s) Oral every 6 hours PRN For Temp greater than 38 C (100.4 F)  benzocaine 15 mG/menthol 3.6 mG Lozenge 1 Lozenge Oral every 2 hours PRN Sore Throat      Allergies    penicillin (Rash)    Intolerances        LABS:                        7.4    3.7   )-----------( 98       ( 2017 08:14 )             24.8         134<L>  |  99  |  14  ----------------------------<  166<H>  3.7   |  25  |  1.10    Ca    8.5      2017 08:14  Phos  3.1       Mg     2.2         TPro  7.0  /  Alb  3.4  /  TBili  0.3  /  DBili  <0.2  /  AST  19  /  ALT  10  /  AlkPhos  79      PT/INR - ( 2017 19:20 )   PT: 12.4 sec;   INR: 1.11          PTT - ( 2017 19:20 )  PTT:29.5 sec  Urinalysis Basic - ( 2017 21:59 )    Color: Yellow / Appearance: Clear / S.015 / pH: x  Gluc: x / Ketone: NEGATIVE  / Bili: NEGATIVE / Urobili: 0.2 E.U./dL   Blood: x / Protein: Trace mg/dL / Nitrite: NEGATIVE   Leuk Esterase: NEGATIVE / RBC: < 5 /HPF / WBC < 5 /HPF   Sq Epi: x / Non Sq Epi: Moderate /HPF / Bacteria: Present /HPF        RADIOLOGY & ADDITIONAL TESTS: INTERVAL HPI/OVERNIGHT EVENTS: No overnight events. Patient is no longer febrile. Cough persists and is the patient's only complaint. Reports sleeping overnight and some appetite, but decreased from baseline.    VITAL SIGNS:  T(F): 97.6 (17 @ 11:30)  HR: 77 (17 @ 11:30)  BP: 123/80 (17 @ 11:30)  RR: 18 (17 @ 11:30)  SpO2: 96% (17 @ 11:30)  Wt(kg): --    PHYSICAL EXAM:    Constitutional: WGWN, AOx3, son was present  Eyes: PERRL, EOMI, no conjunctival injection  ENMT: no pharyngeal exudate, MMM  Neck: supple, nontender, no LAD  Respiratory: good respiratory effort and air movement, L lung clear in all fields, R lung with expiratory rhonchi diffusely  Cardiovascular: RRR, no MRG, normal S1S2  Gastrointestinal: abdomen soft, nontender, BS+, nontender to palpation  Extremities: no lower extremity edema  Vascular: radial and pedal pulses present b/L  Neurological: CN II-XII grossly intact      MEDICATIONS  (STANDING):  heparin  Injectable 5000 Unit(s) SubCutaneous every 8 hours  insulin lispro (HumaLOG) corrective regimen sliding scale   SubCutaneous Before meals and at bedtime  pantoprazole    Tablet 20 milliGRAM(s) Oral two times a day before meals  fluticasone propionate 50 MICROgram(s)/spray Nasal Spray 2 Spray(s) Both Nostrils daily  ALBUTerol/ipratropium for Nebulization 3 milliLiter(s) Nebulizer every 6 hours  PPD  5 Tuberculin Unit(s) Injectable 5 Unit(s) IntraDermal once    MEDICATIONS  (PRN):  HYDROcodone/homatropine Syrup 5 milliLiter(s) Oral every 6 hours PRN worse Cough  acetaminophen   Tablet 650 milliGRAM(s) Oral every 6 hours PRN For Temp greater than 38 C (100.4 F)  benzocaine 15 mG/menthol 3.6 mG Lozenge 1 Lozenge Oral every 2 hours PRN Sore Throat      Allergies    penicillin (Rash)    Intolerances        LABS:                        7.4    3.7   )-----------( 98       ( 2017 08:14 )             24.8         134<L>  |  99  |  14  ----------------------------<  166<H>  3.7   |  25  |  1.10    Ca    8.5      2017 08:14  Phos  3.1       Mg     2.2         TPro  7.0  /  Alb  3.4  /  TBili  0.3  /  DBili  <0.2  /  AST  19  /  ALT  10  /  AlkPhos  79      PT/INR - ( 2017 19:20 )   PT: 12.4 sec;   INR: 1.11          PTT - ( 2017 19:20 )  PTT:29.5 sec  Urinalysis Basic - ( 2017 21:59 )    Color: Yellow / Appearance: Clear / S.015 / pH: x  Gluc: x / Ketone: NEGATIVE  / Bili: NEGATIVE / Urobili: 0.2 E.U./dL   Blood: x / Protein: Trace mg/dL / Nitrite: NEGATIVE   Leuk Esterase: NEGATIVE / RBC: < 5 /HPF / WBC < 5 /HPF   Sq Epi: x / Non Sq Epi: Moderate /HPF / Bacteria: Present /HPF        RADIOLOGY & ADDITIONAL TESTS: INTERVAL HPI/OVERNIGHT EVENTS: No overnight events. Patient is no longer febrile. Cough persists and is the patient's only complaint. Reports sleeping overnight and some appetite, but decreased from baseline.    VITAL SIGNS:  T(F): 97.6 (17 @ 11:30)  HR: 77 (17 @ 11:30)  BP: 123/80 (17 @ 11:30)  RR: 18 (17 @ 11:30)  SpO2: 96% (17 @ 11:30)  Wt(kg): --    PHYSICAL EXAM:    Constitutional: WGWN, AOx3, son was present  Eyes: PERRL, EOMI, no conjunctival injection  ENMT: no pharyngeal exudate, MMM  Neck: supple, nontender, no LAD  Respiratory: good respiratory effort and air movement, L lung clear in all fields, R lung base with expiratory rhonchi  , scant rales   Cardiovascular: RRR, no MRG, normal S1S2  Gastrointestinal: abdomen soft, nontender, BS+, nontender to palpation  Extremities: no lower extremity edema  Vascular: radial and pedal pulses present b/L  Neurological: CN II-XII grossly intact, 5/5 str all 4 ext, sensation intact bl      MEDICATIONS  (STANDING):  heparin  Injectable 5000 Unit(s) SubCutaneous every 8 hours  insulin lispro (HumaLOG) corrective regimen sliding scale   SubCutaneous Before meals and at bedtime  pantoprazole    Tablet 20 milliGRAM(s) Oral two times a day before meals  fluticasone propionate 50 MICROgram(s)/spray Nasal Spray 2 Spray(s) Both Nostrils daily  ALBUTerol/ipratropium for Nebulization 3 milliLiter(s) Nebulizer every 6 hours  PPD  5 Tuberculin Unit(s) Injectable 5 Unit(s) IntraDermal once    MEDICATIONS  (PRN):  HYDROcodone/homatropine Syrup 5 milliLiter(s) Oral every 6 hours PRN worse Cough  acetaminophen   Tablet 650 milliGRAM(s) Oral every 6 hours PRN For Temp greater than 38 C (100.4 F)  benzocaine 15 mG/menthol 3.6 mG Lozenge 1 Lozenge Oral every 2 hours PRN Sore Throat      Allergies    penicillin (Rash)    Intolerances        LABS:                        7.4    3.7   )-----------( 98       ( 2017 08:14 )             24.8     06-28    134<L>  |  99  |  14  ----------------------------<  166<H>  3.7   |  25  |  1.10    Ca    8.5      2017 08:14  Phos  3.1       Mg     2.2         TPro  7.0  /  Alb  3.4  /  TBili  0.3  /  DBili  <0.2  /  AST  19  /  ALT  10  /  AlkPhos  79      PT/INR - ( 2017 19:20 )   PT: 12.4 sec;   INR: 1.11          PTT - ( 2017 19:20 )  PTT:29.5 sec  Urinalysis Basic - ( 2017 21:59 )    Color: Yellow / Appearance: Clear / S.015 / pH: x  Gluc: x / Ketone: NEGATIVE  / Bili: NEGATIVE / Urobili: 0.2 E.U./dL   Blood: x / Protein: Trace mg/dL / Nitrite: NEGATIVE   Leuk Esterase: NEGATIVE / RBC: < 5 /HPF / WBC < 5 /HPF   Sq Epi: x / Non Sq Epi: Moderate /HPF / Bacteria: Present /HPF        RADIOLOGY & ADDITIONAL TESTS:

## 2017-06-28 NOTE — PROGRESS NOTE ADULT - PROBLEM SELECTOR PLAN 2
Progressively worsening cough over past 6 months  .   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 Sarcoidosis vs. Metastatic disease  - Bronchoscopy done 2 days ago, AFB neg on fluorochrome stain  -Sputum AFB neg on fluorochrome stain  -Gold Quant negative, but given delay due to lab machine repair will f/u PPD placed today in L forearm  -Continue to f/u Pulm recs  -d/c Breo and Qvar  -Flonase   Hycodan for cough Progressively worsening cough over past 6 months  .   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 Sarcoidosis vs. Metastatic disease. Patient now off airborne isolation.  - Bronchoscopy done 2 days ago, AFB neg on fluorochrome stain  -Sputum AFB neg on fluorochrome stain  -Gold Quant negative, but given delay due to lab machine repair will f/u PPD placed today in L forearm  -Continue to f/u Pulm recs  -d/c Breo and Qvar  -Flonase   Hycodan for cough Progressively worsening cough over past 6 months  .   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 Sarcoidosis vs. Metastatic disease. Patient now off airborne isolation.  - Bronchoscopy done 2 days ago, AFB neg on fluorochrome stain  -Sputum AFB neg on fluorochrome stain  -Gold Quant negative, but given delay due to lab machine repair will f/u PPD placed today in L forearm  -Continue to f/u Pulm recs  -Flonase  -Hycodan for cough Progressively worsening cough over past 6 months  .   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 Sarcoidosis vs. Metastatic disease. Bronchoscopy done 2 days ago, AFB neg on fluorochrome stain. Sputum AFB neg on fluorochrome stain. Patient now off airborne isolation.  -Gold Quant negative, but given delay due to lab machine repair will f/u PPD placed today in L forearm  -Continue to f/u Pulm recs  -Flonase  -Hycodan for cough

## 2017-06-28 NOTE — DIETITIAN INITIAL EVALUATION ADULT. - OTHER INFO
Pt admitted with pneumonia. Pt tolerating diet with poor to fair intake. Pt reports to dislike the hospital food options, offered pt and son a hospital menu and offered alternate food choices. Denies and n/v/d/c. No pain. Pt reports a 7 lb wt loss over the past few months due to illness. Also reports a 22 lb wt loss over the past 2 years since the death of her . Offered patient oral calorie/protein supplement and pt agreeable to trying. NKFA.

## 2017-06-28 NOTE — PROGRESS NOTE ADULT - PROBLEM SELECTOR PLAN 6
Chronic, baseline Hgb 9.5-10. Has had BM biopsy in the past and follows w/ hematologist. WBC and platelet counts also low. HD stable.  -Continue to monitor in the hospital, consider transfusion if Hgb < 7.0

## 2017-06-28 NOTE — PROGRESS NOTE ADULT - SUBJECTIVE AND OBJECTIVE BOX
Interval Events:  Patient seen and examined at bedside.  No complaints.  Bronch BAL negative for AFB        Vital Signs Last 24 Hrs  T(C): 36.4 (2017 11:30), Max: 37.4 (2017 05:16)  T(F): 97.6 (2017 11:30), Max: 99.4 (2017 05:16)  HR: 77 (2017 11:30) (71 - 95)  BP: 123/80 (2017 11:30) (109/66 - 130/69)  BP(mean): --  RR: 18 (2017 11:30) (16 - 18)  SpO2: 96% (2017 11:30) (96% - 100%)     @ 07:01  -   @ 07:00  --------------------------------------------------------  IN: 100 mL / OUT: 300 mL / NET: -200 mL          PHYSICAL EXAM:    General: Well developed; well nourished; in no acute distress  Neck: Supple; non tender; no masses  Respiratory: Clear to auscultation bilaterally without wheezing, rhonchi or rales  Cardiovascular: Regular rate and rhythm. S1 and S2 Normal; No murmurs, gallops or rubs  Extremities: Normal range of motion, No clubbing, cyanosis or edema  Neurological: Alert and oriented x3  Skin: Warm and dry. No obvious rash    LABS:  ABG - ( 2017 18:52 )  pH: 7.49  /  pCO2: 36    /  pO2: 55    / HCO3: 27    / Base Excess: 3.3   /  SaO2: 90                  CBC Full  -  ( 2017 08:14 )  WBC Count : 3.7 K/uL  Hemoglobin : 7.4 g/dL  Hematocrit : 24.8 %  Platelet Count - Automated : 98 K/uL  Mean Cell Volume : 84.4 fL  Mean Cell Hemoglobin : 25.2 pg  Mean Cell Hemoglobin Concentration : 29.8 g/dL          134<L>  |  99  |  14  ----------------------------<  166<H>  3.7   |  25  |  1.10    Ca    8.5      2017 08:14  Phos  3.1       Mg     2.2         TPro  7.0  /  Alb  3.4  /  TBili  0.3  /  DBili  <0.2  /  AST  19  /  ALT  10  /  AlkPhos  79      PT/INR - ( 2017 19:20 )   PT: 12.4 sec;   INR: 1.11          PTT - ( 2017 19:20 )  PTT:29.5 sec      Urinalysis Basic - ( 2017 21:59 )    Color: Yellow / Appearance: Clear / S.015 / pH: x  Gluc: x / Ketone: NEGATIVE  / Bili: NEGATIVE / Urobili: 0.2 E.U./dL   Blood: x / Protein: Trace mg/dL / Nitrite: NEGATIVE   Leuk Esterase: NEGATIVE / RBC: < 5 /HPF / WBC < 5 /HPF   Sq Epi: x / Non Sq Epi: Moderate /HPF / Bacteria: Present /HPF                RADIOLOGY & ADDITIONAL STUDIES (The following images were personally reviewed):

## 2017-06-28 NOTE — PROGRESS NOTE ADULT - PROBLEM SELECTOR PLAN 1
Severe Sepsis with fever tachycardia and AMS 2 nights ago. Stable last night and today. BAL culture returned + for H. influenzae and CXR + for developing consolidation  -Starting Azithromycin 500 mg PO x 1 day (today), followed by Azithromycin 250 mg PO x 4 days (starting tomorrow)  -Vanc/Aztreonam D/C'd  Ttylenol and fluids for fever  -Blood Cx's w/ NG at 1 day Severe Sepsis with fever tachycardia and AMS 2 nights ago. Stable last night and today. BAL culture returned + for H. influenzae and CXR + for developing consolidation  -Starting Azithromycin 500 mg PO x 1 day (today), followed by Azithromycin 250 mg PO x 4 days (starting tomorrow)  -Vanc/Aztreonam D/C'd  Tylenol and fluids for fever  -Blood Cx's w/ NG at 1 day

## 2017-06-28 NOTE — DIETITIAN INITIAL EVALUATION ADULT. - ENERGY NEEDS
Height: 64" Weight: 153lbs, IBW 120lbs+/-10%, %%, BMI - 26  IBW used to calculate energy needs due to pt's current body weight exceeding 120% of IBW  Nutrient needs based on [facility] standards of care for maintenance in older adults.

## 2017-06-29 LAB
-  AMPICILLIN/SULBACTAM: SIGNIFICANT CHANGE UP
-  AMPICILLIN: SIGNIFICANT CHANGE UP
-  AZITHROMYCIN: SIGNIFICANT CHANGE UP
-  CEFTRIAXONE: SIGNIFICANT CHANGE UP
-  CHLORAMPHENICOL: SIGNIFICANT CHANGE UP
-  CIPROFLOXACIN: SIGNIFICANT CHANGE UP
-  CLARITHROMYCIN: SIGNIFICANT CHANGE UP
-  LEVOFLOXACIN: SIGNIFICANT CHANGE UP
-  TRIMETHOPRIM/SULFAMETHOXAZOLE: SIGNIFICANT CHANGE UP
ANION GAP SERPL CALC-SCNC: 11 MMOL/L — SIGNIFICANT CHANGE UP (ref 5–17)
BUN SERPL-MCNC: 13 MG/DL — SIGNIFICANT CHANGE UP (ref 7–23)
CALCIUM SERPL-MCNC: 8.6 MG/DL — SIGNIFICANT CHANGE UP (ref 8.4–10.5)
CHLORIDE SERPL-SCNC: 103 MMOL/L — SIGNIFICANT CHANGE UP (ref 96–108)
CO2 SERPL-SCNC: 25 MMOL/L — SIGNIFICANT CHANGE UP (ref 22–31)
CREAT SERPL-MCNC: 1.1 MG/DL — SIGNIFICANT CHANGE UP (ref 0.5–1.3)
CULTURE RESULTS: SIGNIFICANT CHANGE UP
GLUCOSE SERPL-MCNC: 123 MG/DL — HIGH (ref 70–99)
HCT VFR BLD CALC: 24.8 % — LOW (ref 34.5–45)
HGB BLD-MCNC: 7.6 G/DL — LOW (ref 11.5–15.5)
MAGNESIUM SERPL-MCNC: 2 MG/DL — SIGNIFICANT CHANGE UP (ref 1.6–2.6)
MCHC RBC-ENTMCNC: 25.5 PG — LOW (ref 27–34)
MCHC RBC-ENTMCNC: 30.6 G/DL — LOW (ref 32–36)
MCV RBC AUTO: 83.2 FL — SIGNIFICANT CHANGE UP (ref 80–100)
METHOD TYPE: SIGNIFICANT CHANGE UP
NON-GYNECOLOGICAL CYTOLOGY STUDY: SIGNIFICANT CHANGE UP
ORGANISM # SPEC MICROSCOPIC CNT: SIGNIFICANT CHANGE UP
ORGANISM # SPEC MICROSCOPIC CNT: SIGNIFICANT CHANGE UP
PLATELET # BLD AUTO: 132 K/UL — LOW (ref 150–400)
POTASSIUM SERPL-MCNC: 4.1 MMOL/L — SIGNIFICANT CHANGE UP (ref 3.5–5.3)
POTASSIUM SERPL-SCNC: 4.1 MMOL/L — SIGNIFICANT CHANGE UP (ref 3.5–5.3)
RBC # BLD: 2.98 M/UL — LOW (ref 3.8–5.2)
RBC # FLD: 15.2 % — SIGNIFICANT CHANGE UP (ref 10.3–16.9)
SODIUM SERPL-SCNC: 139 MMOL/L — SIGNIFICANT CHANGE UP (ref 135–145)
SPECIMEN SOURCE: SIGNIFICANT CHANGE UP
WBC # BLD: 3.2 K/UL — LOW (ref 3.8–10.5)
WBC # FLD AUTO: 3.2 K/UL — LOW (ref 3.8–10.5)

## 2017-06-29 PROCEDURE — 99232 SBSQ HOSP IP/OBS MODERATE 35: CPT

## 2017-06-29 PROCEDURE — 99233 SBSQ HOSP IP/OBS HIGH 50: CPT

## 2017-06-29 RX ADMIN — Medication 2 SPRAY(S): at 12:29

## 2017-06-29 RX ADMIN — HEPARIN SODIUM 5000 UNIT(S): 5000 INJECTION INTRAVENOUS; SUBCUTANEOUS at 14:27

## 2017-06-29 RX ADMIN — Medication 650 MILLIGRAM(S): at 05:48

## 2017-06-29 RX ADMIN — Medication 3 MILLILITER(S): at 10:47

## 2017-06-29 RX ADMIN — HEPARIN SODIUM 5000 UNIT(S): 5000 INJECTION INTRAVENOUS; SUBCUTANEOUS at 06:19

## 2017-06-29 RX ADMIN — AZITHROMYCIN 250 MILLIGRAM(S): 500 TABLET, FILM COATED ORAL at 12:29

## 2017-06-29 RX ADMIN — Medication 2: at 22:16

## 2017-06-29 RX ADMIN — Medication 3 MILLILITER(S): at 17:30

## 2017-06-29 RX ADMIN — PANTOPRAZOLE SODIUM 20 MILLIGRAM(S): 20 TABLET, DELAYED RELEASE ORAL at 06:19

## 2017-06-29 RX ADMIN — PANTOPRAZOLE SODIUM 20 MILLIGRAM(S): 20 TABLET, DELAYED RELEASE ORAL at 17:30

## 2017-06-29 RX ADMIN — Medication 3 MILLILITER(S): at 05:48

## 2017-06-29 RX ADMIN — Medication 3 MILLILITER(S): at 22:16

## 2017-06-29 RX ADMIN — HEPARIN SODIUM 5000 UNIT(S): 5000 INJECTION INTRAVENOUS; SUBCUTANEOUS at 22:16

## 2017-06-29 NOTE — PROGRESS NOTE ADULT - SUBJECTIVE AND OBJECTIVE BOX
Interval Events:  Low grade fever overnight.        Vital Signs Last 24 Hrs  T(C): 36.7 (29 Jun 2017 09:48), Max: 38.1 (29 Jun 2017 05:25)  T(F): 98.1 (29 Jun 2017 09:48), Max: 100.6 (29 Jun 2017 05:25)  HR: 70 (29 Jun 2017 09:48) (70 - 90)  BP: 110/63 (29 Jun 2017 09:48) (104/56 - 118/64)  BP(mean): --  RR: 17 (29 Jun 2017 09:48) (16 - 18)  SpO2: 95% (29 Jun 2017 09:48) (94% - 95%)        LABS:      CBC Full  -  ( 29 Jun 2017 07:12 )  WBC Count : 3.2 K/uL  Hemoglobin : 7.6 g/dL  Hematocrit : 24.8 %  Platelet Count - Automated : 132 K/uL  Mean Cell Volume : 83.2 fL  Mean Cell Hemoglobin : 25.5 pg  Mean Cell Hemoglobin Concentration : 30.6 g/dL      06-29    139  |  103  |  13  ----------------------------<  123<H>  4.1   |  25  |  1.10    Ca    8.6      29 Jun 2017 07:12  Mg     2.0     06-29                        RADIOLOGY & ADDITIONAL STUDIES (The following images were personally reviewed):

## 2017-06-29 NOTE — PROGRESS NOTE ADULT - PROBLEM SELECTOR PLAN 2
Progressively worsening cough over past 6 months  .   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 Sarcoidosis vs. Metastatic disease. Bronchoscopy done 2 days ago, AFB neg on fluorochrome stain. Sputum AFB neg on fluorochrome stain. Patient now off airborne isolation.  -Gold Quant negative, but given delay due to lab machine repair will f/u PPD placed today in L forearm  -Continue to f/u Pulm recs  -Flonase  -Hycodan for cough Progressively worsening cough over past 6 months. 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 Sarcoidosis vs. Metastatic disease. BAL negative AFB and sputum neg AFB x3. Patient now off airborne isolation.  -F/u second Quantiferon  -F/u PPD placed yesterday in L forearm (today is 48 hrs, tomorrow 72 hrs)  -Continue to f/u Pulm recs  -Flonase  -Hycodan for cough  -Consider bone marrow biopsy to R/O malignancy Progressively worsening cough over past 6 months. 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 Sarcoidosis vs. Metastatic disease. BAL negative AFB and sputum neg AFB x3. Patient now off airborne isolation.  -F/u second Quantiferon  -F/u PPD placed yesterday in L forearm (today is 48 hrs, tomorrow 72 hrs)  -Continue to f/u Pulm recs  -Flonase  -Hycodan for cough  -Heme/onc to perform BM biopsy tomorrow for r/o malignancy Progressively worsening cough over past 6 months. 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 Sarcoidosis vs. Metastatic disease. BAL negative AFB and sputum neg AFB x3. Patient now off airborne isolation.  -F/u second Quantiferon  -F/u PPD placed yesterday in L forearm (today is 48 hrs, tomorrow 72 hrs)  -Continue to f/u Pulm recs  -Flonase  -Hycodan for cough  -Heme/onc to possibly perform BM biopsy tomorrow for r/o malignancy

## 2017-06-29 NOTE — PROGRESS NOTE ADULT - PROBLEM SELECTOR PLAN 6
Chronic, baseline Hgb 9.5-10. Has had BM biopsy in the past and follows w/ hematologist. WBC and platelet counts also low. HD stable.  -Continue to monitor in the hospital, consider transfusion if Hgb < 7.0 Chronic, baseline Hgb 9.5-10. Has had BM biopsy in the past and follows w/ hematologist. WBC now 3.2. HD stable.  -Continue to monitor in the hospital, consider transfusion if Hgb < 7.0  -Consider bone marrow biopsy

## 2017-06-29 NOTE — PROGRESS NOTE ADULT - SUBJECTIVE AND OBJECTIVE BOX
O/N EVENTS: Patient found febrile to 100.6F 05:25 and was given acetaminophen. As of 09:48 temp is 98.1F. No other overnight events.    SUBJECTIVE: Patient seen and examined at bedside. Only complains of cough, which continues to disturb her sleep. Otherwise reports no changes.    ROS:  CV: Denies chest pain  Resp: Denies SOB  GI: Denies abdominal pain, diarrhea, nausea, vomiting  : Denies dysuria  ID: Mild chills around time of fever early this morning    OBJECTIVE:    VITALS  Vital Signs Last 24 Hrs  T(C): 36.7 (29 Jun 2017 09:48), Max: 38.1 (29 Jun 2017 05:25)  T(F): 98.1 (29 Jun 2017 09:48), Max: 100.6 (29 Jun 2017 05:25)  HR: 70 (29 Jun 2017 09:48) (70 - 90)  BP: 110/63 (29 Jun 2017 09:48) (104/56 - 123/80)  BP(mean): --  RR: 17 (29 Jun 2017 09:48) (16 - 18)  SpO2: 95% (29 Jun 2017 09:48) (94% - 96%)    I&O's Summary    CAPILLARY BLOOD GLUCOSE  123 (29 Jun 2017 08:33)  123 (28 Jun 2017 17:18)  110 (28 Jun 2017 11:53)    PHYSICAL EXAM  General: A&Ox 3; NAD, sitting upright comfortably on side of bed  Eyes: PERRL; EOMI  ENMT: MMM, no pharyngeal erythema/exudate  Neck: Supple; no LAD  Respiratory: CTAB; no W/R/R auscultated; good air movement  Cardiovascular: RRR; S1/S2; no murmur  Gastrointestinal: Soft; NTND w/out rebound tenderness or guarding; bowel sounds present  Extremities: WWP; no edema; radial/pedal pulses palpable  Neurological:  CNII-XII grossly intact    MEDICATIONS  (STANDING):  heparin  Injectable 5000 Unit(s) SubCutaneous every 8 hours  insulin lispro (HumaLOG) corrective regimen sliding scale   SubCutaneous Before meals and at bedtime  pantoprazole    Tablet 20 milliGRAM(s) Oral two times a day before meals  fluticasone propionate 50 MICROgram(s)/spray Nasal Spray 2 Spray(s) Both Nostrils daily  ALBUTerol/ipratropium for Nebulization 3 milliLiter(s) Nebulizer every 6 hours  azithromycin   Tablet 250 milliGRAM(s) Oral daily    MEDICATIONS  (PRN):  HYDROcodone/homatropine Syrup 5 milliLiter(s) Oral every 6 hours PRN worse Cough  acetaminophen   Tablet 650 milliGRAM(s) Oral every 6 hours PRN For Temp greater than 38 C (100.4 F)  benzocaine 15 mG/menthol 3.6 mG Lozenge 1 Lozenge Oral every 2 hours PRN Sore Throat      LABS                        7.6    3.2   )-----------( 132      ( 29 Jun 2017 07:12 )             24.8     06-29    139  |  103  |  13  ----------------------------<  123<H>  4.1   |  25  |  1.10    Ca    8.6      29 Jun 2017 07:12  Mg     2.0     06-29      Allergies:  penicillin (Rash)    RADIOLOGY and Additional Tests: Reviewed. O/N EVENTS: Patient found febrile to 100.6F 05:25 and was given acetaminophen. As of 09:48 temp is 98.1F. No other overnight events.    SUBJECTIVE: Patient seen and examined at bedside. Only complains of cough, which continues to disturb her sleep. Otherwise reports no changes. no dyspnea, chest pain. no sputum, hemoptysis.     ROS:  CV: Denies chest pain  Resp: Denies SOB  GI: Denies abdominal pain, diarrhea, nausea, vomiting  : Denies dysuria  ID: Mild chills around time of fever early this morning    OBJECTIVE:    VITALS  Vital Signs Last 24 Hrs  T(C): 36.7 (29 Jun 2017 09:48), Max: 38.1 (29 Jun 2017 05:25)  T(F): 98.1 (29 Jun 2017 09:48), Max: 100.6 (29 Jun 2017 05:25)  HR: 70 (29 Jun 2017 09:48) (70 - 90)  BP: 110/63 (29 Jun 2017 09:48) (104/56 - 123/80)  BP(mean): --  RR: 17 (29 Jun 2017 09:48) (16 - 18)  SpO2: 95% (29 Jun 2017 09:48) (94% - 96%)    I&O's Summary    CAPILLARY BLOOD GLUCOSE  123 (29 Jun 2017 08:33)  123 (28 Jun 2017 17:18)  110 (28 Jun 2017 11:53)    PHYSICAL EXAM  General: A&Ox 3; NAD, sitting upright comfortably on side of bed  Eyes: PERRL; EOMI  ENMT: MMM, no pharyngeal erythema/exudate  Neck: Supple; no LAD  Respiratory: CTAB; no W/R/R auscultated; good air movement  Cardiovascular: RRR; S1/S2;   Gastrointestinal: Soft; NTND w/out rebound tenderness or guarding; bowel sounds present  Extremities: WWP; no edema; radial/pedal pulses palpable  Neurological:  CNII-XII grossly intact, 5/5 str all 4 ext, sensation intact bl    MEDICATIONS  (STANDING):  heparin  Injectable 5000 Unit(s) SubCutaneous every 8 hours  insulin lispro (HumaLOG) corrective regimen sliding scale   SubCutaneous Before meals and at bedtime  pantoprazole    Tablet 20 milliGRAM(s) Oral two times a day before meals  fluticasone propionate 50 MICROgram(s)/spray Nasal Spray 2 Spray(s) Both Nostrils daily  ALBUTerol/ipratropium for Nebulization 3 milliLiter(s) Nebulizer every 6 hours  azithromycin   Tablet 250 milliGRAM(s) Oral daily    MEDICATIONS  (PRN):  HYDROcodone/homatropine Syrup 5 milliLiter(s) Oral every 6 hours PRN worse Cough  acetaminophen   Tablet 650 milliGRAM(s) Oral every 6 hours PRN For Temp greater than 38 C (100.4 F)  benzocaine 15 mG/menthol 3.6 mG Lozenge 1 Lozenge Oral every 2 hours PRN Sore Throat      LABS                        7.6    3.2   )-----------( 132      ( 29 Jun 2017 07:12 )             24.8     06-29    139  |  103  |  13  ----------------------------<  123<H>  4.1   |  25  |  1.10    Ca    8.6      29 Jun 2017 07:12  Mg     2.0     06-29      Allergies:  penicillin (Rash)    RADIOLOGY and Additional Tests: Reviewed.

## 2017-06-29 NOTE — PROGRESS NOTE ADULT - PROBLEM SELECTOR PLAN 1
Patient continues stable with intermittent febrile episodes. BAL culture returned + for H. influenzae and CXR + for developing consolidation. Continuing Azithromycin course.  -Continue Azithromycin at 250 mg, today is day 2 of 5. Given 500 mg yesterday, but receiving 250 mg for remainder of course including today  -Tylenol and fluids for febrile episodes  -Blood Cx's w/ NG at 1 day Patient continues stable with intermittent febrile episodes. BAL culture returned + for pansensitive (resistant to Bactrim) H. influenzae and CXR + for developing consolidation. Continuing Azithromycin course.  -Continue Azithromycin at 250 mg, today is day 2 of 5. Given 500 mg yesterday, but receiving 250 mg for remainder of course including today  -Tylenol and fluids for febrile episodes  -Blood Cx's w/ NG at 2 days Patient continues stable with intermittent low grade temps   BAL culture returned + for pansensitive (resistant to Bactrim) H. influenzae and CXR + for developing consolidation. Continuing Azithromycin course.  -Continue Azithromycin at 250 mg, today is day 2 of 5. Given 500 mg yesterday, but receiving 250 mg for remainder of course including today  -Tylenol and fluids for febrile episodes  -Blood Cx's w/ NG at 2 days

## 2017-06-29 NOTE — PROGRESS NOTE ADULT - PROBLEM SELECTOR PLAN 9
F: none  E: replete as needed  N: diabetic diet    CODE: FULL    DISPO: continue management on 7W F: none  E: replete as needed to K > 4 Mg > 2  N: diabetic diet    CODE: FULL    DISPO: continue management on 7W

## 2017-06-29 NOTE — PROGRESS NOTE ADULT - PROBLEM SELECTOR PLAN 5
BP stable off of home BP meds  -Continue to hold, monitor BP. BP stable (-130) off of home BP meds  -Continue to hold, monitor BP.

## 2017-06-29 NOTE — PROGRESS NOTE ADULT - PROBLEM SELECTOR PLAN 1
Differential as above.  BAL smear for AFB negative. 5 days of Azithromycin for Haemophilus.  -F/U quantiferon and PPD  -Awaiting final EBUS pathology to be reviewed by pathologist (will review with him tomorrow)

## 2017-06-30 ENCOUNTER — RESULT REVIEW (OUTPATIENT)
Age: 82
End: 2017-06-30

## 2017-06-30 VITALS
OXYGEN SATURATION: 97 % | TEMPERATURE: 99 F | SYSTOLIC BLOOD PRESSURE: 126 MMHG | HEART RATE: 86 BPM | DIASTOLIC BLOOD PRESSURE: 73 MMHG | RESPIRATION RATE: 16 BRPM

## 2017-06-30 LAB
ANION GAP SERPL CALC-SCNC: 9 MMOL/L — SIGNIFICANT CHANGE UP (ref 5–17)
BUN SERPL-MCNC: 17 MG/DL — SIGNIFICANT CHANGE UP (ref 7–23)
CALCIUM SERPL-MCNC: 8.6 MG/DL — SIGNIFICANT CHANGE UP (ref 8.4–10.5)
CHLORIDE SERPL-SCNC: 100 MMOL/L — SIGNIFICANT CHANGE UP (ref 96–108)
CO2 SERPL-SCNC: 26 MMOL/L — SIGNIFICANT CHANGE UP (ref 22–31)
CREAT SERPL-MCNC: 1.1 MG/DL — SIGNIFICANT CHANGE UP (ref 0.5–1.3)
DEPRECATED M TB RPOB XXX QL NAA+PROBE: SIGNIFICANT CHANGE UP
GLUCOSE SERPL-MCNC: 106 MG/DL — HIGH (ref 70–99)
HCT VFR BLD CALC: 24.5 % — LOW (ref 34.5–45)
HGB BLD-MCNC: 7.6 G/DL — LOW (ref 11.5–15.5)
M TB CMPLX DNA SPT/BRO QL NAA+PROBE: SIGNIFICANT CHANGE UP
M TB CMPLX DNA SPT/BRO QL NAA+PROBE: SIGNIFICANT CHANGE UP
M TB TUBERC IFN-G BLD QL: 0 IU/ML — SIGNIFICANT CHANGE UP
M TB TUBERC IFN-G BLD QL: 0.03 IU/ML — SIGNIFICANT CHANGE UP
M TB TUBERC IFN-G BLD QL: NEGATIVE — SIGNIFICANT CHANGE UP
MAGNESIUM SERPL-MCNC: 2.1 MG/DL — SIGNIFICANT CHANGE UP (ref 1.6–2.6)
MCHC RBC-ENTMCNC: 25.7 PG — LOW (ref 27–34)
MCHC RBC-ENTMCNC: 31 G/DL — LOW (ref 32–36)
MCV RBC AUTO: 82.8 FL — SIGNIFICANT CHANGE UP (ref 80–100)
MITOGEN IGNF BCKGRD COR BLD-ACNC: 1.04 IU/ML — SIGNIFICANT CHANGE UP
MTB RIF RES GENE SPT NAA+PROBE: SIGNIFICANT CHANGE UP
PLATELET # BLD AUTO: 124 K/UL — LOW (ref 150–400)
POTASSIUM SERPL-MCNC: 4.1 MMOL/L — SIGNIFICANT CHANGE UP (ref 3.5–5.3)
POTASSIUM SERPL-SCNC: 4.1 MMOL/L — SIGNIFICANT CHANGE UP (ref 3.5–5.3)
RBC # BLD: 2.96 M/UL — LOW (ref 3.8–5.2)
RBC # FLD: 15.1 % — SIGNIFICANT CHANGE UP (ref 10.3–16.9)
RIFAMPIN PCR INTERP: SIGNIFICANT CHANGE UP
SODIUM SERPL-SCNC: 135 MMOL/L — SIGNIFICANT CHANGE UP (ref 135–145)
WBC # BLD: 2.8 K/UL — LOW (ref 3.8–10.5)
WBC # FLD AUTO: 2.8 K/UL — LOW (ref 3.8–10.5)

## 2017-06-30 PROCEDURE — 87116 MYCOBACTERIA CULTURE: CPT

## 2017-06-30 PROCEDURE — 80076 HEPATIC FUNCTION PANEL: CPT

## 2017-06-30 PROCEDURE — 94640 AIRWAY INHALATION TREATMENT: CPT

## 2017-06-30 PROCEDURE — 88305 TISSUE EXAM BY PATHOLOGIST: CPT

## 2017-06-30 PROCEDURE — 80053 COMPREHEN METABOLIC PANEL: CPT

## 2017-06-30 PROCEDURE — 87015 SPECIMEN INFECT AGNT CONCNTJ: CPT

## 2017-06-30 PROCEDURE — 85027 COMPLETE CBC AUTOMATED: CPT

## 2017-06-30 PROCEDURE — 71250 CT THORAX DX C-: CPT

## 2017-06-30 PROCEDURE — 94660 CPAP INITIATION&MGMT: CPT

## 2017-06-30 PROCEDURE — 83735 ASSAY OF MAGNESIUM: CPT

## 2017-06-30 PROCEDURE — 88112 CYTOPATH CELL ENHANCE TECH: CPT

## 2017-06-30 PROCEDURE — 87070 CULTURE OTHR SPECIMN AEROBIC: CPT

## 2017-06-30 PROCEDURE — 87305 ASPERGILLUS AG IA: CPT

## 2017-06-30 PROCEDURE — 83605 ASSAY OF LACTIC ACID: CPT

## 2017-06-30 PROCEDURE — 84100 ASSAY OF PHOSPHORUS: CPT

## 2017-06-30 PROCEDURE — 71046 X-RAY EXAM CHEST 2 VIEWS: CPT

## 2017-06-30 PROCEDURE — 84443 ASSAY THYROID STIM HORMONE: CPT

## 2017-06-30 PROCEDURE — 81001 URINALYSIS AUTO W/SCOPE: CPT

## 2017-06-30 PROCEDURE — 87556 M.TUBERCULO DNA AMP PROBE: CPT

## 2017-06-30 PROCEDURE — 93005 ELECTROCARDIOGRAM TRACING: CPT

## 2017-06-30 PROCEDURE — 87449 NOS EACH ORGANISM AG IA: CPT

## 2017-06-30 PROCEDURE — 74177 CT ABD & PELVIS W/CONTRAST: CPT

## 2017-06-30 PROCEDURE — 99285 EMERGENCY DEPT VISIT HI MDM: CPT | Mod: 25

## 2017-06-30 PROCEDURE — 87086 URINE CULTURE/COLONY COUNT: CPT

## 2017-06-30 PROCEDURE — 87206 SMEAR FLUORESCENT/ACID STAI: CPT

## 2017-06-30 PROCEDURE — 88342 IMHCHEM/IMCYTCHM 1ST ANTB: CPT

## 2017-06-30 PROCEDURE — 82803 BLOOD GASES ANY COMBINATION: CPT

## 2017-06-30 PROCEDURE — 83036 HEMOGLOBIN GLYCOSYLATED A1C: CPT

## 2017-06-30 PROCEDURE — 85730 THROMBOPLASTIN TIME PARTIAL: CPT

## 2017-06-30 PROCEDURE — 87184 SC STD DISK METHOD PER PLATE: CPT

## 2017-06-30 PROCEDURE — 88173 CYTOPATH EVAL FNA REPORT: CPT

## 2017-06-30 PROCEDURE — 36415 COLL VENOUS BLD VENIPUNCTURE: CPT

## 2017-06-30 PROCEDURE — 83880 ASSAY OF NATRIURETIC PEPTIDE: CPT

## 2017-06-30 PROCEDURE — 99239 HOSP IP/OBS DSCHRG MGMT >30: CPT

## 2017-06-30 PROCEDURE — 87040 BLOOD CULTURE FOR BACTERIA: CPT

## 2017-06-30 PROCEDURE — 87102 FUNGUS ISOLATION CULTURE: CPT

## 2017-06-30 PROCEDURE — 85025 COMPLETE CBC W/AUTO DIFF WBC: CPT

## 2017-06-30 PROCEDURE — 88312 SPECIAL STAINS GROUP 1: CPT

## 2017-06-30 PROCEDURE — 71045 X-RAY EXAM CHEST 1 VIEW: CPT

## 2017-06-30 PROCEDURE — 99232 SBSQ HOSP IP/OBS MODERATE 35: CPT

## 2017-06-30 PROCEDURE — 86738 MYCOPLASMA ANTIBODY: CPT

## 2017-06-30 PROCEDURE — 85610 PROTHROMBIN TIME: CPT

## 2017-06-30 PROCEDURE — 87389 HIV-1 AG W/HIV-1&-2 AB AG IA: CPT

## 2017-06-30 PROCEDURE — 86631 CHLAMYDIA ANTIBODY: CPT

## 2017-06-30 PROCEDURE — 80048 BASIC METABOLIC PNL TOTAL CA: CPT

## 2017-06-30 PROCEDURE — 88341 IMHCHEM/IMCYTCHM EA ADD ANTB: CPT

## 2017-06-30 PROCEDURE — 89051 BODY FLUID CELL COUNT: CPT

## 2017-06-30 PROCEDURE — 84145 PROCALCITONIN (PCT): CPT

## 2017-06-30 PROCEDURE — 85097 BONE MARROW INTERPRETATION: CPT

## 2017-06-30 PROCEDURE — 88313 SPECIAL STAINS GROUP 2: CPT

## 2017-06-30 RX ORDER — LIDOCAINE HCL 20 MG/ML
50 VIAL (ML) INJECTION ONCE
Qty: 0 | Refills: 0 | Status: COMPLETED | OUTPATIENT
Start: 2017-06-30 | End: 2017-06-30

## 2017-06-30 RX ORDER — AMLODIPINE BESYLATE 2.5 MG/1
1 TABLET ORAL
Qty: 0 | Refills: 0 | COMMUNITY

## 2017-06-30 RX ORDER — ALBUTEROL 90 UG/1
2 AEROSOL, METERED ORAL
Qty: 1 | Refills: 0 | OUTPATIENT
Start: 2017-06-30 | End: 2017-07-30

## 2017-06-30 RX ORDER — AZITHROMYCIN 500 MG/1
1 TABLET, FILM COATED ORAL
Qty: 2 | Refills: 0 | OUTPATIENT
Start: 2017-06-30 | End: 2017-07-02

## 2017-06-30 RX ADMIN — Medication 50 MILLILITER(S): at 13:45

## 2017-06-30 RX ADMIN — Medication 3 MILLILITER(S): at 11:40

## 2017-06-30 RX ADMIN — AZITHROMYCIN 250 MILLIGRAM(S): 500 TABLET, FILM COATED ORAL at 11:40

## 2017-06-30 RX ADMIN — TUBERCULIN PURIFIED PROTEIN DERIVATIVE 5 UNIT(S): 5 INJECTION, SOLUTION INTRADERMAL at 14:42

## 2017-06-30 RX ADMIN — PANTOPRAZOLE SODIUM 20 MILLIGRAM(S): 20 TABLET, DELAYED RELEASE ORAL at 06:39

## 2017-06-30 RX ADMIN — HEPARIN SODIUM 5000 UNIT(S): 5000 INJECTION INTRAVENOUS; SUBCUTANEOUS at 06:39

## 2017-06-30 RX ADMIN — Medication 3 MILLILITER(S): at 06:38

## 2017-06-30 RX ADMIN — Medication 2 SPRAY(S): at 11:40

## 2017-06-30 NOTE — CONSULT NOTE ADULT - ASSESSMENT
81 yo F with hx HTN, HLD, DM2 presenting with chronic cough of 6 months duration with pancytopenia, etiology unclear and splenic lesions

## 2017-06-30 NOTE — PROGRESS NOTE ADULT - PROBLEM SELECTOR PROBLEM 4
Essential hypertension
Essential hypertension
Type 2 diabetes mellitus with complication, without long-term current use of insulin

## 2017-06-30 NOTE — CHART NOTE - NSCHARTNOTEFT_GEN_A_CORE
Called by nurse that patient had a fever to 102.8 with altered mental status. Went to assess patient.     VS T 102.8, , /57, Sat 97 on 2L NC    PE:  General: disoriented, responsive  Pulm: R sided crackles with rhonchi  Cardiac: tachy  Neuro: not responding verbally to questioning but following commands, no focal motor or sensory deficit, no facial droop, PERRL      Assessment  Reassessed patient after 1L bolus and tylenol with reduction in fever to 102 with return of mental status, A&Ox3  Delirium associated with fever. Concern for pulmonary pathology in the setting of bronchoscopy. Given recent instrumentation will cover for HCAP.  - ABG concerning for hypoxia, will increase O2 delivery. Secondary to PNA  - Duonebs q6h, Bipap at bedside. Discussed with pulmonary  - CXR  - Vanco/Aztreonam for HCAP coverage, will need ID approval for Aztreonam  - fever control with tylenol, ice packs, fluids
House staff made aware of medication error at 0138. According to nurse, pt had POC glucose of 150 and was given 2 units of Insulin at 2200 by mistake. Repeat blood glucose was 132. House staff was contacted immediately after becoming aware of medication error. Pt was asymptomatic, no signs of hypoglycemia including n/v, lightheadedness, fatigue, mental confusion, headache, palpitations, excessive hunger, sweating. Will monitor blood glucose closely and treat accordingly.
Pulmonary fellow addendum to consult.    CT scan reviewed in MIP images.  Innumerable nodules in miliary pattern.  Concern for TB vs metastatic cancer.  Would put in respiratory isolation for rule out TB and obtain 3 sputum for AFB (can use 3% or 7% nebulized saline with sputum induction).  Would also obtain CT Abd/Pelvis to look for malignancy.  Plan for bronch with EBUS next week.

## 2017-06-30 NOTE — PROGRESS NOTE ADULT - ASSESSMENT
82 year old woman with chronic cough and multiple nodules on CT chest concerning for miliary TB vs metastatic disease vs other granulomatous disease
82 year old woman with chronic cough and multiple nodules on CT chest concerning for miliary TB vs metastatic disease.
82 year old woman with chronic cough and multiple pulmonary nodules
82 year old woman with chronic cough and multiple pulmonary nodules.
82F PMH HTN, HLD, DMII, GERD, CKD stage IIIb, Anemia (unknown cause, s/p BM biopsy 1 yr ago) p/w worsening cough x6 mos. Patient stable, but continues with intermittent febrile episodes and cough. CT chest and abdomen support DDx of miliary TB vs. sarcoidosis vs. malignancy. Awaiting repeat Quantiferon and PPD read to R/O miliary TB.
82F PMH HTN, HLD, DMII, GERD, CKD stage IIIb, Anemia (unknown cause, s/p BM biopsy 1 yr ago) p/w worsening cough x6 mos. Patient stable, but continues with intermittent febrile episodes and cough. CT chest and abdomen support DDx of miliary TB vs. sarcoidosis vs. malignancy. Awaiting repeat Quantiferon and PPD read to R/O miliary TB.
82F PMHx HTN, HLD, DMII, GERD, CKD stage IIIb, Anemia (unknown cause, s/p BM biopsy 1 yr ago) presents to [facility] complaining of worsening cough over the past six months. CT chest concerning for miliary TB
82F PMHx HTN, HLD, DMII, GERD, CKD stage IIIb, Anemia (unknown cause, s/p BM biopsy 1 yr ago) presents to [facility] complaining of worsening cough over the past six months. CT chest concerning for miliary TB
82F PMHx HTN, HLD, DMII, GERD, CKD stage IIIb, Anemia (unknown cause, s/p BM biopsy 1 yr ago) presents to [facility] complaining of worsening cough over the past six months. CT chest concerning for miliary TB
82F PMHx HTN, HLD, DMII, GERD, CKD stage IIIb, Anemia (unknown cause, s/p BM biopsy 1 yr ago) presents to [facility] complaining of worsening cough over the past six months. CT chest concerning for miliary TB
82F PMH HTN, HLD, DMII, GERD, CKD stage IIIb, Anemia (unknown cause, s/p BM biopsy 1 yr ago) p/w worsening cough x6 mos. Patient stable s/p febrile episode 2 nights ago. CT chest and abdomen support DDx of miliary non-pulmonary TB vs. sarcoidosis vs. malignancy.
82 year old woman with chronic cough and +ppd

## 2017-06-30 NOTE — CONSULT NOTE ADULT - PROBLEM SELECTOR RECOMMENDATION 9
etiology unclear, investigated byhematologist in past with no definitive diagnosis, now w/ splenic lesions  - plan for bone marrow biopsy today  - outpt hematology follow up. may need PET/CT and splenic lesion bx in future.

## 2017-06-30 NOTE — PROGRESS NOTE ADULT - PROBLEM SELECTOR PROBLEM 7
Gastroesophageal reflux disease without esophagitis
Prophylactic measure
Prophylactic measure
Gastroesophageal reflux disease without esophagitis

## 2017-06-30 NOTE — PROGRESS NOTE ADULT - SUBJECTIVE AND OBJECTIVE BOX
Hematology/Oncology Procedure Note     Bone Marrow Aspiration/Biopsy    Indication: pancytopenia    Bone marrow aspiration and biopsy procedure description, risks, and benefits were discussed in detail with the patient.  All questions were answered.  Informed consent was obtained and time-out performed.      The area of the RIGHT posterior iliac crest was prepared with Chlorhexidine and lidocaine 2% was injected for local anesthesia.    Bone marrow aspiration was performed using sterile technique.  Specimens were obtained for microscopic evaluation/cytogenetics/flow cytometry/culture.    Bone marrow biopsy was also performed and specimen collected for pathologic evaluation.    The procedure was well tolerated and no local bleeding or other complications were observed.  Pressure was applied to the procedure site and a wound dressing was placed.  The patient and nursing staff were advised that the patient is to lie flat for 30 minutes.  Tylenol may be used if no contraindications for pain at the procedure site.

## 2017-06-30 NOTE — CONSULT NOTE ADULT - SUBJECTIVE AND OBJECTIVE BOX
Hematology Oncology Consult Note (Dr Quick)    The patient was seen and examined at bedside.    83 yo F with hx HTN, HLD, DM2 presenting with chronic cough of 6 months duration, admitted to [facility], CT scan showed evidence of milliary pattern suggestive of TB- had sputum cultures, bronchoscopy and cultures all not suggestive of TB. Pt's on admission had pancytopenia- per son pt has had life long pancytopenia not complicated by childhood infections or bleeding. No hx of chemo or RT. Pt was evaluated 2 yrs ago by hematologist at [facility], had a bone marrow done that was inconclusive. Of note, pt also has CT imaging showing multiple splenic lesions that have increased since her last CT scan 2 yrs ago. She reports a chronic non productive cough, no fevers, chills weight loss, night sweats or fatigue. Denies chest pain or shortness of breath.       ROS is otherwise negative.    PAST MEDICAL & SURGICAL HISTORY:  CKD (chronic kidney disease) stage 3, GFR 30-59 ml/min  Anemia, unspecified type  GERD (gastroesophageal reflux disease)  High cholesterol  Diabetes  HTN (hypertension)  H/O: hysterectomy  S/P cholecystectomy  S/P appendectomy      Allergies: NKDA      Medications:  MEDICATIONS  (STANDING):  heparin  Injectable 5000 Unit(s) SubCutaneous every 8 hours  insulin lispro (HumaLOG) corrective regimen sliding scale   SubCutaneous Before meals and at bedtime  pantoprazole    Tablet 20 milliGRAM(s) Oral two times a day before meals  fluticasone propionate 50 MICROgram(s)/spray Nasal Spray 2 Spray(s) Both Nostrils daily  ALBUTerol/ipratropium for Nebulization 3 milliLiter(s) Nebulizer every 6 hours  azithromycin   Tablet 250 milliGRAM(s) Oral daily      Social History: former smoker, no alcohol use    FAMILY HISTORY:  No pertinent family history in first degree relatives      PHYSICAL EXAM:    T(F): 98.6 (06-30-17 @ 14:52), Max: 99.6 (06-29-17 @ 22:00)  HR: 86 (06-30-17 @ 14:52) (86 - 88)  BP: 126/73 (06-30-17 @ 14:52) (126/73 - 138/70)  RR: 16 (06-30-17 @ 14:52) (16 - 16)  SpO2: 97% (06-30-17 @ 14:52) (96% - 97%)    Gen: well developed, well nourished, comfortable  HEENT: normocephalic/atraumatic, no conjunctival pallor, no scleral icterus, no oral thrush/mucosal bleeding/mucositis  Neck: supple, no masses, no JVD  Back: nontender  Cardiovascular: RR, nl S1S2, no murmurs/rubs/gallops  Respiratory: clear air entry   Gastrointestinal: BS+, soft, NT/ND, no masses, no splenomegaly, no hepatomegaly, no evidence for ascites  Extremities: no clubbing/cyanosis, no edema, no calf tenderness, DP/PT 2+ b/l  Neurological: no focal deficits  Skin: no rash on visible skin  Lymph Nodes:  no cervical/supraclavicular LAD, no axillary/groin LAD                          7.6    2.8   )-----------( 124      ( 30 Jun 2017 06:11 )             24.5       CBC Full  -  ( 30 Jun 2017 06:11 )  WBC Count : 2.8 K/uL  Hemoglobin : 7.6 g/dL  Hematocrit : 24.5 %  Platelet Count - Automated : 124 K/uL  Mean Cell Volume : 82.8 fL  Mean Cell Hemoglobin : 25.7 pg  Mean Cell Hemoglobin Concentration : 31.0 g/dL        06-30    135  |  100  |  17  ----------------------------<  106<H>  4.1   |  26  |  1.10    Ca    8.6      30 Jun 2017 06:11  Mg     2.1     06-30

## 2017-06-30 NOTE — PROGRESS NOTE ADULT - PROBLEM SELECTOR PROBLEM 8
Nutrition, metabolism, and development symptoms
Nutrition, metabolism, and development symptoms
Prophylactic measure

## 2017-06-30 NOTE — PROGRESS NOTE ADULT - ATTENDING COMMENTS
I have had detailed discussions about this case with the patient's son, Dr. Blair and pathology attending.   She feels well and is ambulating. No new symptoms. No fevers.  However, she does have an elevated Pro-Calcitonin and H. influenzae in the BAL fluid. Would give her 5 days of IV/oral antibiotics (excluding fluoroquinolones)  Dr. Tavarez from [facility] informed that the patient appears to have had non-necrotizing granulomas in the EBUS guided LN biopsy. However, this preliminary report needs to be verified. We will sit with Dr. Tavarez tomorrow to review the path    The results of the Quantiferon and PPD are pending. The first Quantiferon was negative in a patient with a history of (+) PPD.     I have also requested that hematology be involved to consider bone marrow biopsy after results of EBUS LN sampling are known.
Elderly woman with 6 month hx of non-productive cough, small amount of weight loss, no fever reported.  Has + PPD but family tells me she had BCG vaccination in past, no hx TB active disease. AFB negative, but this is likely to be negative in miliary (hematogenous) TB.  CBC shows mild pancytopenia.  Chest exam negative.  Miliary TB is possible, would imply immunosuppression- should check HIV status. In addition to metastatic disease sarcoidosis is possibility. Told family that unless there are other findings we would plan bronchoscopic bx on 6/26.  Bone marrow bx for anemia, if thought worthwhile, can also be diagnostic for miliary TB.
I concur with the evaluation. The patient is comfortably lying in bed and did not cough during the physical exam and family discussion. She is afebrile. Her Quantiferon came back negative. This is surprising in the light of the son (a health care provider) that she was PPD positive. On further questioning, he stated that he had not seen her positive reaction but had always been told that she was PPD positive. We will repeat a Quantiferon and plant a PPD. Results of three sputa and BAL smears for AFB came back negative. Respiratory isolation was discontinued.   We will await results of the BAL/TBBx/EBUS. If a diagnosis is not established, will recommend a bone marrow biopsy.  The son had many questions. Our team spent over 30 min answering his questions
I have examined the patient. She feels well. Cough still continues. no fevers. Lungs are clear. No cervical LN. No articular manifestations of collagen vascular disease.  Assessment:  Miliary pattern on CT scan. Concerned about miliary TB. R/O other granulomatous diseases like fungal infections. Would also like to rule out miliary metastatic disease  Bronchoscopy with BALO, TBB and TBNA/EBUS planned today
I have had a detailed discussion with the patient's son. We have spent almost 1/2 hr with our pathology colleagues reviewing the slides (biopsy, BAL and EBUS). We have reviewed the lab results.  I have read the skin test (PPD). THere is neither redness or induration. Quantiferon (2nd) pending. Thyroglobulin panel pending  Based on the path readings, there is no evidence of miliary tuberculosis. No necrotizing granulomas were seen.   I have discussed the CT scan with Dr. Hall, whose input is deeply appreciated. Sub-cm nodules are visible. These are generally randomly distributed, except they are clustered along the fissures (found more so in lymphoproliferative disorders like sarcoidosis). The same process is seen in the liver and the spleen.   We will recommend bone marrow aspiration and biopsy. If an answer is not obtained, will consider wedge biopsy of lung or liver biopsy  In the absence of PPD (+) or Quantiferon positivity, I do not feel that giving 4 anti-TB meds to an 81 year old lady is justified. The incidence of hepatotoxicity increases with age, with anti-TB meds.   We have sent of PCR from BAL fluid. It came back negative

## 2017-06-30 NOTE — PROGRESS NOTE ADULT - SUBJECTIVE AND OBJECTIVE BOX
Interval Events:  Patient seen and examined at bedside.  No complaints.        Vital Signs Last 24 Hrs  T(C): 36.7 (30 Jun 2017 05:37), Max: 37.6 (29 Jun 2017 22:00)  T(F): 98.1 (30 Jun 2017 05:37), Max: 99.6 (29 Jun 2017 22:00)  HR: 88 (30 Jun 2017 05:37) (74 - 88)  BP: 131/67 (30 Jun 2017 05:37) (113/66 - 138/70)  BP(mean): --  RR: 16 (30 Jun 2017 05:37) (16 - 16)  SpO2: 96% (30 Jun 2017 05:37) (94% - 96%)        LABS:      CBC Full  -  ( 30 Jun 2017 06:11 )  WBC Count : 2.8 K/uL  Hemoglobin : 7.6 g/dL  Hematocrit : 24.5 %  Platelet Count - Automated : 124 K/uL  Mean Cell Volume : 82.8 fL  Mean Cell Hemoglobin : 25.7 pg  Mean Cell Hemoglobin Concentration : 31.0 g/dL    06-30    135  |  100  |  17  ----------------------------<  106<H>  4.1   |  26  |  1.10    Ca    8.6      30 Jun 2017 06:11  Mg     2.1     06-30                        RADIOLOGY & ADDITIONAL STUDIES (The following images were personally reviewed):

## 2017-06-30 NOTE — PROGRESS NOTE ADULT - PROBLEM SELECTOR PROBLEM 3
CKD (chronic kidney disease) stage 3, GFR 30-59 ml/min
Type 2 diabetes mellitus with complication, without long-term current use of insulin
Type 2 diabetes mellitus with complication, without long-term current use of insulin
CKD (chronic kidney disease) stage 3, GFR 30-59 ml/min

## 2017-06-30 NOTE — PROGRESS NOTE ADULT - SUBJECTIVE AND OBJECTIVE BOX
Patient is a 82y old  Female who presents with a chief complaint of Cough (27 Jun 2017 13:42)      INTERVAL HPI/OVERNIGHT EVENTS:    feels well  cough still present , but supressed w/ anti-tussive med  no dyspnea  no chest pain      ROS  (- ) headache  ( -  )fevers/chills  (  - ) palpatations  ( - ) dizziness/lightheadedness  (  - ) nausea/vomiting  (  - ) abd pain  (  - ) diarrhea  (  - ) melena  (  - ) hematochezia  (  - ) dysuria   ( - ) hematuria  (  - ) leg swelling    ( - ) calf tenderness  (  - ) motor weakness  ( - ) extremity numbness  ( - ) back pain  ( + ) tolerating POs  ( + ) BM  ROS: 12 point review of systems otherwise negative              MEDICATIONS  (STANDING):  heparin  Injectable 5000 Unit(s) SubCutaneous every 8 hours  insulin lispro (HumaLOG) corrective regimen sliding scale   SubCutaneous Before meals and at bedtime  pantoprazole    Tablet 20 milliGRAM(s) Oral two times a day before meals  fluticasone propionate 50 MICROgram(s)/spray Nasal Spray 2 Spray(s) Both Nostrils daily  ALBUTerol/ipratropium for Nebulization 3 milliLiter(s) Nebulizer every 6 hours  azithromycin   Tablet 250 milliGRAM(s) Oral daily    MEDICATIONS  (PRN):  acetaminophen   Tablet 650 milliGRAM(s) Oral every 6 hours PRN For Temp greater than 38 C (100.4 F)  benzocaine 15 mG/menthol 3.6 mG Lozenge 1 Lozenge Oral every 2 hours PRN Sore Throat  HYDROcodone/homatropine Syrup 5 milliLiter(s) Oral every 6 hours PRN worse Cough      Allergies    penicillin (Rash)    Intolerances          Vital Signs Last 24 Hrs  T(C): 37 (30 Jun 2017 14:52), Max: 37.6 (29 Jun 2017 22:00)  T(F): 98.6 (30 Jun 2017 14:52), Max: 99.6 (29 Jun 2017 22:00)  HR: 86 (30 Jun 2017 14:52) (86 - 88)  BP: 126/73 (30 Jun 2017 14:52) (126/73 - 138/70)  BP(mean): --  RR: 16 (30 Jun 2017 14:52) (16 - 16)  SpO2: 97% (30 Jun 2017 14:52) (96% - 97%)  CAPILLARY BLOOD GLUCOSE  131 (30 Jun 2017 12:42)  132 (30 Jun 2017 00:13)  150 (29 Jun 2017 21:29)  108 (29 Jun 2017 17:29)            Physical Exam:    Daily     Daily   General:well appearing  Eyes: PERRL; EOMI  ENMT: MMM, no pharyngeal erythema/exudate  Neck: Supple; no LAD  Respiratory: scant rales at right base, no wheeze/rhonchi  Cardiovascular: RRR; S1/S2 nml   Gastrointestinal: Soft; NT/ND   bowel sounds present  Extremities: WWP; no edema; radial/pedal pulses palpable  Neurological:  CNII-XII grossly intact, 5/5 str all 4 ext, sensation intact bl  LABS:                        7.6    2.8   )-----------( 124      ( 30 Jun 2017 06:11 )             24.5     06-30    135  |  100  |  17  ----------------------------<  106<H>  4.1   |  26  |  1.10    Ca    8.6      30 Jun 2017 06:11  Mg     2.1     06-30              RADIOLOGY & ADDITIONAL TESTS:

## 2017-06-30 NOTE — PROGRESS NOTE ADULT - PROBLEM SELECTOR PLAN 2
TB ruled out  BAL negative AFB and sputum neg AFB x3. ppd (-). 2nd quantiferon --pending  bx of nodule (-) afb stain     -Continue to f/u Pulm recs  -Flonase  -Hycodan for cough  -Heme/onc to possibly perform BM biopsy tomorrow for r/o malignancy

## 2017-06-30 NOTE — PROGRESS NOTE ADULT - PROBLEM SELECTOR PLAN 3
baseline Cr 1.3 on outpatient labs. Currently creat is at baseline, no intervention necessary. Likely secondary to DM and HTN
Baseline Cr 1.3 on outpatient labs. GFR at baseline. No intervention necessary. Likely secondary to DM and HTN
Baseline Cr 1.3 on outpatient labs. GFR at baseline. No intervention necessary. Likely secondary to DM and HTN
On Metformin 500mg daily at home. Will hold in hospital.  - ISS and adjust as necessary
On Metformin 500mg daily at home. Will hold in hospital.  - ISS and adjust as necessary
baseline Cr 1.3 on outpatient labs. Currently GFR is at baseline, no intervention necessary. Likely secondary to DM and HTN
Baseline Cr 1.3 on outpatient labs. GFR at baseline. No intervention necessary. Likely secondary to DM and HTN

## 2017-06-30 NOTE — PROGRESS NOTE ADULT - NSHPATTENDINGPLANDISCUSS_GEN_ALL_CORE
misty
misty
fahad
house staff
Medical team, pts son, Pulmonary consult service
son, path, pulmonary, medicine team, etc
housestaff

## 2017-06-30 NOTE — PROGRESS NOTE ADULT - PROBLEM SELECTOR PLAN 1
Concern for miliary TB vs malignancy vs other inflammatory process.  Pathology reviewed from transbronchial biopsy and EBUS, no evidence of TB.  PPD negative.  Awaiting repeat quantiferon (initial was negative).  -Hematology consulted for bone marrow biopsy.  GMS stain pending for 1PM.    -Check mycoplasma and chylmadia titers.  -After BM biopsy and GMS stain read, should be able to be discharged.  She should follow-up with Dr. Bright next Thursday in his clinic.  [address] 3rd floor.

## 2017-06-30 NOTE — PROGRESS NOTE ADULT - PROBLEM SELECTOR PROBLEM 2
CKD (chronic kidney disease) stage 3, GFR 30-59 ml/min
CKD (chronic kidney disease) stage 3, GFR 30-59 ml/min
R/O Tuberculosis

## 2017-06-30 NOTE — PROGRESS NOTE ADULT - PROBLEM SELECTOR PROBLEM 6
Gastroesophageal reflux disease without esophagitis
Gastroesophageal reflux disease without esophagitis
Pancytopenia

## 2017-06-30 NOTE — PROGRESS NOTE ADULT - PROBLEM SELECTOR PLAN 6
Chronic  . Has had BM biopsy in the past and follows w/ hematologist.   bone marrow biopsy today  pt to follow up results of BM bx during follow up with pulm next thurs

## 2017-06-30 NOTE — PROGRESS NOTE ADULT - PROBLEM SELECTOR PROBLEM 1
Pneumonia of right lung due to infectious organism, unspecified part of lung
Pulmonary nodules
Pulmonary nodules/lesions, multiple
R/O Tuberculosis
R/O Tuberculosis
Pneumonia of right lung due to infectious organism, unspecified part of lung
Pulmonary nodules
Abnormal CT scan, chest

## 2017-07-01 LAB
CULTURE RESULTS: SIGNIFICANT CHANGE UP
CULTURE RESULTS: SIGNIFICANT CHANGE UP
NIGHT BLUE STAIN TISS: SIGNIFICANT CHANGE UP
SPECIMEN SOURCE: SIGNIFICANT CHANGE UP

## 2017-07-01 RX ORDER — AZITHROMYCIN 500 MG/1
1 TABLET, FILM COATED ORAL
Qty: 2 | Refills: 0 | OUTPATIENT
Start: 2017-07-01 | End: 2017-07-03

## 2017-07-03 ENCOUNTER — EMERGENCY (EMERGENCY)
Facility: HOSPITAL | Age: 82
LOS: 0 days | Discharge: HOME | End: 2017-07-03
Attending: EMERGENCY MEDICINE | Admitting: EMERGENCY MEDICINE
Payer: MEDICARE

## 2017-07-03 VITALS
TEMPERATURE: 99 F | OXYGEN SATURATION: 100 % | SYSTOLIC BLOOD PRESSURE: 138 MMHG | HEART RATE: 77 BPM | RESPIRATION RATE: 19 BRPM | DIASTOLIC BLOOD PRESSURE: 58 MMHG

## 2017-07-03 VITALS — HEIGHT: 64 IN | WEIGHT: 153 LBS

## 2017-07-03 DIAGNOSIS — E11.9 TYPE 2 DIABETES MELLITUS WITHOUT COMPLICATIONS: ICD-10-CM

## 2017-07-03 DIAGNOSIS — Z90.49 ACQUIRED ABSENCE OF OTHER SPECIFIED PARTS OF DIGESTIVE TRACT: Chronic | ICD-10-CM

## 2017-07-03 DIAGNOSIS — J18.9 PNEUMONIA, UNSPECIFIED ORGANISM: ICD-10-CM

## 2017-07-03 DIAGNOSIS — D64.9 ANEMIA, UNSPECIFIED: ICD-10-CM

## 2017-07-03 DIAGNOSIS — I12.9 HYPERTENSIVE CHRONIC KIDNEY DISEASE WITH STAGE 1 THROUGH STAGE 4 CHRONIC KIDNEY DISEASE, OR UNSPECIFIED CHRONIC KIDNEY DISEASE: ICD-10-CM

## 2017-07-03 DIAGNOSIS — D61.818 OTHER PANCYTOPENIA: ICD-10-CM

## 2017-07-03 DIAGNOSIS — B96.3 HEMOPHILUS INFLUENZAE [H. INFLUENZAE] AS THE CAUSE OF DISEASES CLASSIFIED ELSEWHERE: ICD-10-CM

## 2017-07-03 DIAGNOSIS — R91.8 OTHER NONSPECIFIC ABNORMAL FINDING OF LUNG FIELD: ICD-10-CM

## 2017-07-03 DIAGNOSIS — F05 DELIRIUM DUE TO KNOWN PHYSIOLOGICAL CONDITION: ICD-10-CM

## 2017-07-03 DIAGNOSIS — M79.605 PAIN IN LEFT LEG: ICD-10-CM

## 2017-07-03 DIAGNOSIS — J15.8 PNEUMONIA DUE TO OTHER SPECIFIED BACTERIA: ICD-10-CM

## 2017-07-03 DIAGNOSIS — R09.02 HYPOXEMIA: ICD-10-CM

## 2017-07-03 DIAGNOSIS — Z88.0 ALLERGY STATUS TO PENICILLIN: ICD-10-CM

## 2017-07-03 DIAGNOSIS — N18.3 CHRONIC KIDNEY DISEASE, STAGE 3 (MODERATE): ICD-10-CM

## 2017-07-03 DIAGNOSIS — E11.22 TYPE 2 DIABETES MELLITUS WITH DIABETIC CHRONIC KIDNEY DISEASE: ICD-10-CM

## 2017-07-03 DIAGNOSIS — K21.9 GASTRO-ESOPHAGEAL REFLUX DISEASE WITHOUT ESOPHAGITIS: ICD-10-CM

## 2017-07-03 DIAGNOSIS — Z90.710 ACQUIRED ABSENCE OF BOTH CERVIX AND UTERUS: ICD-10-CM

## 2017-07-03 DIAGNOSIS — E78.00 PURE HYPERCHOLESTEROLEMIA, UNSPECIFIED: ICD-10-CM

## 2017-07-03 DIAGNOSIS — R05 COUGH: ICD-10-CM

## 2017-07-03 DIAGNOSIS — Z90.710 ACQUIRED ABSENCE OF BOTH CERVIX AND UTERUS: Chronic | ICD-10-CM

## 2017-07-03 DIAGNOSIS — R50.9 FEVER, UNSPECIFIED: ICD-10-CM

## 2017-07-03 PROBLEM — I10 ESSENTIAL (PRIMARY) HYPERTENSION: Chronic | Status: ACTIVE | Noted: 2017-06-23

## 2017-07-03 LAB
ALBUMIN SERPL ELPH-MCNC: 3 G/DL — LOW (ref 3.3–5)
ALP SERPL-CCNC: 106 U/L — SIGNIFICANT CHANGE UP (ref 40–120)
ALT FLD-CCNC: 19 U/L — SIGNIFICANT CHANGE UP (ref 12–78)
ANION GAP SERPL CALC-SCNC: 6 MMOL/L — SIGNIFICANT CHANGE UP (ref 5–17)
ANISOCYTOSIS BLD QL: SLIGHT — SIGNIFICANT CHANGE UP
AST SERPL-CCNC: 16 U/L — SIGNIFICANT CHANGE UP (ref 15–37)
BASOPHILS # BLD AUTO: 0 K/UL — SIGNIFICANT CHANGE UP (ref 0–0.2)
BASOPHILS NFR BLD AUTO: 1 % — SIGNIFICANT CHANGE UP (ref 0–2)
BILIRUB SERPL-MCNC: 0.3 MG/DL — SIGNIFICANT CHANGE UP (ref 0.2–1.2)
BUN SERPL-MCNC: 22 MG/DL — SIGNIFICANT CHANGE UP (ref 7–23)
C PNEUM IGM TITR SER: SIGNIFICANT CHANGE UP TITER
CALCIUM SERPL-MCNC: 8.8 MG/DL — SIGNIFICANT CHANGE UP (ref 8.5–10.1)
CHLAMYDIA IGG SER-ACNC: SIGNIFICANT CHANGE UP TITER
CHLAMYDIA PNEUMONIAE IGA: SIGNIFICANT CHANGE UP TITER
CHLORIDE SERPL-SCNC: 101 MMOL/L — SIGNIFICANT CHANGE UP (ref 96–108)
CO2 SERPL-SCNC: 30 MMOL/L — SIGNIFICANT CHANGE UP (ref 22–31)
CREAT SERPL-MCNC: 1.21 MG/DL — SIGNIFICANT CHANGE UP (ref 0.5–1.3)
EOSINOPHIL # BLD AUTO: 0 K/UL — SIGNIFICANT CHANGE UP (ref 0–0.5)
EOSINOPHIL NFR BLD AUTO: 0.4 % — SIGNIFICANT CHANGE UP (ref 0–6)
GLUCOSE SERPL-MCNC: 111 MG/DL — HIGH (ref 70–99)
HCT VFR BLD CALC: 26.5 % — LOW (ref 34.5–45)
HGB BLD-MCNC: 8.8 G/DL — LOW (ref 11.5–15.5)
HYPOCHROMIA BLD QL: SLIGHT — SIGNIFICANT CHANGE UP
INR BLD: 1.08 RATIO — SIGNIFICANT CHANGE UP (ref 0.88–1.16)
LG PLATELETS BLD QL AUTO: SLIGHT — SIGNIFICANT CHANGE UP
LYMPHOCYTES # BLD AUTO: 1.3 K/UL — SIGNIFICANT CHANGE UP (ref 1–3.3)
LYMPHOCYTES # BLD AUTO: 31 % — SIGNIFICANT CHANGE UP (ref 13–44)
MANUAL DIF COMMENT BLD-IMP: SIGNIFICANT CHANGE UP
MCHC RBC-ENTMCNC: 26.8 PG — LOW (ref 27–34)
MCHC RBC-ENTMCNC: 33.3 GM/DL — SIGNIFICANT CHANGE UP (ref 32–36)
MCV RBC AUTO: 80.5 FL — SIGNIFICANT CHANGE UP (ref 80–100)
MISCELLANEOUS TEST NAME: SIGNIFICANT CHANGE UP
MONOCYTES # BLD AUTO: 0.6 K/UL — SIGNIFICANT CHANGE UP (ref 0–0.9)
MONOCYTES NFR BLD AUTO: 15.4 % — HIGH (ref 2–14)
NEUTROPHILS # BLD AUTO: 2.1 K/UL — SIGNIFICANT CHANGE UP (ref 1.8–7.4)
NEUTROPHILS NFR BLD AUTO: 52.2 % — SIGNIFICANT CHANGE UP (ref 43–77)
PLAT MORPH BLD: NORMAL — SIGNIFICANT CHANGE UP
PLATELET # BLD AUTO: 196 K/UL — SIGNIFICANT CHANGE UP (ref 150–400)
POIKILOCYTOSIS BLD QL AUTO: SLIGHT — SIGNIFICANT CHANGE UP
POLYCHROMASIA BLD QL SMEAR: SLIGHT — SIGNIFICANT CHANGE UP
POTASSIUM SERPL-MCNC: 4 MMOL/L — SIGNIFICANT CHANGE UP (ref 3.5–5.3)
POTASSIUM SERPL-SCNC: 4 MMOL/L — SIGNIFICANT CHANGE UP (ref 3.5–5.3)
PROT SERPL-MCNC: 7.5 GM/DL — SIGNIFICANT CHANGE UP (ref 6–8.3)
PROTHROM AB SERPL-ACNC: 11.7 SEC — SIGNIFICANT CHANGE UP (ref 9.8–12.7)
RBC # BLD: 3.3 M/UL — LOW (ref 3.8–5.2)
RBC # FLD: 13.6 % — SIGNIFICANT CHANGE UP (ref 10.3–14.5)
RBC BLD AUTO: (no result)
SODIUM SERPL-SCNC: 137 MMOL/L — SIGNIFICANT CHANGE UP (ref 135–145)
WBC # BLD: 4.1 K/UL — SIGNIFICANT CHANGE UP (ref 3.8–10.5)
WBC # FLD AUTO: 4.1 K/UL — SIGNIFICANT CHANGE UP (ref 3.8–10.5)

## 2017-07-03 PROCEDURE — 93971 EXTREMITY STUDY: CPT | Mod: 26,LT

## 2017-07-03 PROCEDURE — 99283 EMERGENCY DEPT VISIT LOW MDM: CPT

## 2017-07-03 NOTE — ED STATDOCS - OBJECTIVE STATEMENT
81 y/o F with hx of HTN, HLD, and DM presents to the ED c/o left calf pain. Pt was d/c'd 2 days ago from [facility], admitted for chronic cough. She has been ambulatory since discharge. currently pt has no other complaints and denies CP and SOB.

## 2017-07-03 NOTE — ED STATDOCS - PROGRESS NOTE DETAILS
RIANNA Cevallos: Patient has been seen, evaluated and orders have been written by the attending in intake. Patient is stable.  I will follow up the results of orders written and I will continue to evaluate/observe the patient.

## 2017-07-03 NOTE — ED ADULT TRIAGE NOTE - CHIEF COMPLAINT QUOTE
left leg pain, swelling over vein, son concerned about dvt, patient recently hospitalized, she was receiving subq heparin while in the hospital and has been ambulatory since she has been home

## 2017-07-03 NOTE — ED STATDOCS - MEDICAL DECISION MAKING DETAILS
signed Joanna Cevallos PA-C   No DVT on sono. outpt f/u. Labs similar to prior. Pt feeling well, pt and family agree with DC and plan of care. Pt declines  services.

## 2017-07-05 LAB
HEMATOPATHOLOGY REPORT: SIGNIFICANT CHANGE UP
M PNEUMO IGG SER IA-ACNC: (no result)
M PNEUMO IGG SER IA-ACNC: 1.05 INDEX — HIGH

## 2017-07-06 ENCOUNTER — APPOINTMENT (OUTPATIENT)
Dept: PULMONOLOGY | Facility: CLINIC | Age: 82
End: 2017-07-06

## 2017-07-06 VITALS
HEIGHT: 64 IN | OXYGEN SATURATION: 98 % | HEART RATE: 67 BPM | SYSTOLIC BLOOD PRESSURE: 106 MMHG | TEMPERATURE: 98.1 F | BODY MASS INDEX: 25.95 KG/M2 | DIASTOLIC BLOOD PRESSURE: 64 MMHG | WEIGHT: 152 LBS

## 2017-07-06 DIAGNOSIS — R05 COUGH: ICD-10-CM

## 2017-07-06 DIAGNOSIS — D69.6 THROMBOCYTOPENIA, UNSPECIFIED: ICD-10-CM

## 2017-07-06 DIAGNOSIS — K21.9 GASTRO-ESOPHAGEAL REFLUX DISEASE W/OUT ESOPHAGITIS: ICD-10-CM

## 2017-07-06 DIAGNOSIS — Z86.79 PERSONAL HISTORY OF OTHER DISEASES OF THE CIRCULATORY SYSTEM: ICD-10-CM

## 2017-07-06 RX ORDER — ALBUTEROL SULFATE 90 UG/1
108 (90 BASE) AEROSOL, METERED RESPIRATORY (INHALATION)
Refills: 0 | Status: ACTIVE | COMMUNITY

## 2017-07-06 RX ORDER — OMEPRAZOLE 40 MG/1
40 CAPSULE, DELAYED RELEASE ORAL
Refills: 0 | Status: DISCONTINUED | COMMUNITY
End: 2017-07-06

## 2017-07-06 RX ORDER — HYDROCODONE BITARTRATE AND HOMATROPINE METHYLBROMIDE 5; 1.5 MG/5ML; MG/5ML
5-1.5 SYRUP ORAL
Refills: 0 | Status: ACTIVE | COMMUNITY

## 2017-07-07 ENCOUNTER — LABORATORY RESULT (OUTPATIENT)
Age: 82
End: 2017-07-07

## 2017-07-07 LAB
M PNEUMO IGM SER-ACNC: 35 UNITS/ML — SIGNIFICANT CHANGE UP
MYCOPLASMA AG SPEC QL: NEGATIVE — SIGNIFICANT CHANGE UP

## 2017-07-11 LAB
BASOPHILS # BLD AUTO: 0.01 K/UL
BASOPHILS NFR BLD AUTO: 0.4 %
CRP SERPL-MCNC: 1 MG/DL
EOSINOPHIL # BLD AUTO: 0.02 K/UL
EOSINOPHIL NFR BLD AUTO: 0.7 %
ERYTHROCYTE [SEDIMENTATION RATE] IN BLOOD BY WESTERGREN METHOD: 55 MM/HR
FUNGITELL QUANTITATIVE VALUE: <31 PG/ML
GALACTOMANNAN AG SERPL QL IA: <0.5 INDEX
HCT VFR BLD CALC: 32.1 %
HGB BLD-MCNC: 9.7 G/DL
IMM GRANULOCYTES NFR BLD AUTO: 0.7 %
LYMPHOCYTES # BLD AUTO: 0.75 K/UL
LYMPHOCYTES NFR BLD AUTO: 27.6 %
MAN DIFF?: NORMAL
MCHC RBC-ENTMCNC: 25.6 PG
MCHC RBC-ENTMCNC: 30.2 GM/DL
MCV RBC AUTO: 84.7 FL
MONOCYTES # BLD AUTO: 0.43 K/UL
MONOCYTES NFR BLD AUTO: 15.8 %
NEUTROPHILS # BLD AUTO: 1.49 K/UL
NEUTROPHILS NFR BLD AUTO: 54.8 %
PLATELET # BLD AUTO: 248 K/UL
RBC # BLD: 3.79 M/UL
RBC # FLD: 15.4 %
THYROGLOB AB SERPL-ACNC: <20 IU/ML
THYROGLOB SERPL-MCNC: 89 NG/ML
WBC # FLD AUTO: 2.72 K/UL

## 2017-07-12 ENCOUNTER — RESULT REVIEW (OUTPATIENT)
Age: 82
End: 2017-07-12

## 2017-07-19 ENCOUNTER — APPOINTMENT (OUTPATIENT)
Dept: ENDOCRINOLOGY | Facility: CLINIC | Age: 82
End: 2017-07-19

## 2017-07-19 VITALS
SYSTOLIC BLOOD PRESSURE: 125 MMHG | WEIGHT: 147 LBS | HEIGHT: 64 IN | DIASTOLIC BLOOD PRESSURE: 73 MMHG | BODY MASS INDEX: 25.1 KG/M2 | HEART RATE: 85 BPM

## 2017-07-19 DIAGNOSIS — Z78.9 OTHER SPECIFIED HEALTH STATUS: ICD-10-CM

## 2017-07-19 DIAGNOSIS — Z13.29 ENCOUNTER FOR SCREENING FOR OTHER SUSPECTED ENDOCRINE DISORDER: ICD-10-CM

## 2017-07-19 RX ORDER — LEVOFLOXACIN 250 MG/1
250 TABLET, FILM COATED ORAL
Refills: 0 | Status: ACTIVE | COMMUNITY

## 2017-07-20 LAB
ALBUMIN SERPL ELPH-MCNC: 4.1 G/DL
ALP BLD-CCNC: 75 U/L
ALT SERPL-CCNC: 4 U/L
ANION GAP SERPL CALC-SCNC: 16 MMOL/L
AST SERPL-CCNC: 12 U/L
BASOPHILS # BLD AUTO: 0.01 K/UL
BASOPHILS NFR BLD AUTO: 0.2 %
BILIRUB SERPL-MCNC: 0.4 MG/DL
BUN SERPL-MCNC: 16 MG/DL
CALCIUM SERPL-MCNC: 9.8 MG/DL
CHLORIDE SERPL-SCNC: 95 MMOL/L
CHOLEST SERPL-MCNC: 152 MG/DL
CHOLEST/HDLC SERPL: 2.7 RATIO
CO2 SERPL-SCNC: 26 MMOL/L
CREAT SERPL-MCNC: 1.22 MG/DL
EOSINOPHIL # BLD AUTO: 0.02 K/UL
EOSINOPHIL NFR BLD AUTO: 0.4 %
GLUCOSE SERPL-MCNC: 118 MG/DL
HCT VFR BLD CALC: 30.8 %
HDLC SERPL-MCNC: 57 MG/DL
HGB BLD-MCNC: 9.4 G/DL
IMM GRANULOCYTES NFR BLD AUTO: 0.4 %
LDLC SERPL CALC-MCNC: 70 MG/DL
LYMPHOCYTES # BLD AUTO: 1.04 K/UL
LYMPHOCYTES NFR BLD AUTO: 19.3 %
MAN DIFF?: NORMAL
MCHC RBC-ENTMCNC: 25.8 PG
MCHC RBC-ENTMCNC: 30.5 GM/DL
MCV RBC AUTO: 84.6 FL
MONOCYTES # BLD AUTO: 0.9 K/UL
MONOCYTES NFR BLD AUTO: 16.7 %
NEUTROPHILS # BLD AUTO: 3.41 K/UL
NEUTROPHILS NFR BLD AUTO: 63 %
PLATELET # BLD AUTO: 164 K/UL
POTASSIUM SERPL-SCNC: 4.3 MMOL/L
PROT SERPL-MCNC: 7.6 G/DL
RBC # BLD: 3.64 M/UL
RBC # FLD: 16 %
SODIUM SERPL-SCNC: 137 MMOL/L
T3 SERPL-MCNC: 81 NG/DL
T4 FREE SERPL-MCNC: 1.2 NG/DL
THYROGLOB AB SERPL-ACNC: <20 IU/ML
THYROPEROXIDASE AB SERPL IA-ACNC: 19.8 IU/ML
TRIGL SERPL-MCNC: 123 MG/DL
TSH SERPL-ACNC: 1.7 UIU/ML
WBC # FLD AUTO: 5.4 K/UL

## 2017-07-26 LAB
CULTURE RESULTS: SIGNIFICANT CHANGE UP
SPECIMEN SOURCE: SIGNIFICANT CHANGE UP

## 2017-07-31 ENCOUNTER — CHART COPY (OUTPATIENT)
Age: 82
End: 2017-07-31

## 2017-08-03 ENCOUNTER — APPOINTMENT (OUTPATIENT)
Dept: ENDOCRINOLOGY | Facility: CLINIC | Age: 82
End: 2017-08-03

## 2017-08-03 PROBLEM — Z13.29 SCREENING FOR THYROID DISORDER: Status: ACTIVE | Noted: 2017-07-19

## 2017-08-05 LAB
CULTURE RESULTS: SIGNIFICANT CHANGE UP
SPECIMEN SOURCE: SIGNIFICANT CHANGE UP

## 2017-08-11 ENCOUNTER — APPOINTMENT (OUTPATIENT)
Dept: ENDOCRINOLOGY | Facility: CLINIC | Age: 82
End: 2017-08-11

## 2017-08-16 LAB
CULTURE RESULTS: SIGNIFICANT CHANGE UP
SPECIMEN SOURCE: SIGNIFICANT CHANGE UP

## 2017-08-19 LAB
CULTURE RESULTS: SIGNIFICANT CHANGE UP
SPECIMEN SOURCE: SIGNIFICANT CHANGE UP

## 2017-08-22 ENCOUNTER — APPOINTMENT (OUTPATIENT)
Dept: PULMONOLOGY | Facility: CLINIC | Age: 82
End: 2017-08-22
Payer: MEDICARE

## 2017-08-22 VITALS
WEIGHT: 149 LBS | DIASTOLIC BLOOD PRESSURE: 72 MMHG | HEART RATE: 71 BPM | TEMPERATURE: 98.4 F | OXYGEN SATURATION: 97 % | SYSTOLIC BLOOD PRESSURE: 136 MMHG | HEIGHT: 64 IN | BODY MASS INDEX: 25.44 KG/M2

## 2017-08-22 PROCEDURE — 99215 OFFICE O/P EST HI 40 MIN: CPT | Mod: 25

## 2017-08-22 PROCEDURE — 94010 BREATHING CAPACITY TEST: CPT

## 2017-08-24 ENCOUNTER — APPOINTMENT (OUTPATIENT)
Dept: ENDOCRINOLOGY | Facility: CLINIC | Age: 82
End: 2017-08-24

## 2017-08-28 ENCOUNTER — APPOINTMENT (OUTPATIENT)
Dept: ENDOCRINOLOGY | Facility: CLINIC | Age: 82
End: 2017-08-28
Payer: MEDICARE

## 2017-08-28 ENCOUNTER — RESULT REVIEW (OUTPATIENT)
Age: 82
End: 2017-08-28

## 2017-08-28 VITALS
WEIGHT: 149 LBS | HEIGHT: 64 IN | DIASTOLIC BLOOD PRESSURE: 76 MMHG | SYSTOLIC BLOOD PRESSURE: 138 MMHG | BODY MASS INDEX: 25.44 KG/M2 | HEART RATE: 86 BPM

## 2017-08-28 DIAGNOSIS — E04.2 NONTOXIC MULTINODULAR GOITER: ICD-10-CM

## 2017-08-28 PROCEDURE — 10022: CPT

## 2017-08-28 PROCEDURE — 99212 OFFICE O/P EST SF 10 MIN: CPT | Mod: 25

## 2018-03-15 ENCOUNTER — TRANSCRIPTION ENCOUNTER (OUTPATIENT)
Age: 83
End: 2018-03-15

## 2018-03-25 ENCOUNTER — INPATIENT (INPATIENT)
Facility: HOSPITAL | Age: 83
LOS: 2 days | Discharge: HOME | End: 2018-03-28
Attending: SURGERY | Admitting: SURGERY
Payer: MEDICARE

## 2018-03-25 VITALS
WEIGHT: 134.92 LBS | HEART RATE: 78 BPM | RESPIRATION RATE: 16 BRPM | SYSTOLIC BLOOD PRESSURE: 149 MMHG | HEIGHT: 62 IN | DIASTOLIC BLOOD PRESSURE: 63 MMHG | OXYGEN SATURATION: 98 % | TEMPERATURE: 98 F

## 2018-03-25 DIAGNOSIS — Z90.710 ACQUIRED ABSENCE OF BOTH CERVIX AND UTERUS: Chronic | ICD-10-CM

## 2018-03-25 DIAGNOSIS — Z90.49 ACQUIRED ABSENCE OF OTHER SPECIFIED PARTS OF DIGESTIVE TRACT: Chronic | ICD-10-CM

## 2018-03-25 LAB
ALBUMIN SERPL ELPH-MCNC: 3.3 G/DL — SIGNIFICANT CHANGE UP (ref 3.3–5)
ALP SERPL-CCNC: 73 U/L — SIGNIFICANT CHANGE UP (ref 40–120)
ALT FLD-CCNC: 8 U/L — LOW (ref 12–78)
ANION GAP SERPL CALC-SCNC: 10 MMOL/L — SIGNIFICANT CHANGE UP (ref 5–17)
APPEARANCE UR: CLEAR — SIGNIFICANT CHANGE UP
APTT BLD: 26.3 SEC — LOW (ref 27.5–37.4)
AST SERPL-CCNC: 12 U/L — LOW (ref 15–37)
BACTERIA # UR AUTO: (no result)
BASOPHILS # BLD AUTO: 0.01 K/UL — SIGNIFICANT CHANGE UP (ref 0–0.2)
BASOPHILS NFR BLD AUTO: 0.3 % — SIGNIFICANT CHANGE UP (ref 0–2)
BILIRUB SERPL-MCNC: 0.3 MG/DL — SIGNIFICANT CHANGE UP (ref 0.2–1.2)
BILIRUB UR-MCNC: NEGATIVE — SIGNIFICANT CHANGE UP
BUN SERPL-MCNC: 31 MG/DL — HIGH (ref 7–23)
CALCIUM SERPL-MCNC: 9.4 MG/DL — SIGNIFICANT CHANGE UP (ref 8.5–10.1)
CHLORIDE SERPL-SCNC: 103 MMOL/L — SIGNIFICANT CHANGE UP (ref 96–108)
CK SERPL-CCNC: 22 U/L — LOW (ref 26–192)
CO2 SERPL-SCNC: 26 MMOL/L — SIGNIFICANT CHANGE UP (ref 22–31)
COLOR SPEC: YELLOW — SIGNIFICANT CHANGE UP
CREAT SERPL-MCNC: 1.2 MG/DL — SIGNIFICANT CHANGE UP (ref 0.5–1.3)
DIFF PNL FLD: (no result)
EOSINOPHIL # BLD AUTO: 0.01 K/UL — SIGNIFICANT CHANGE UP (ref 0–0.5)
EOSINOPHIL NFR BLD AUTO: 0.3 % — SIGNIFICANT CHANGE UP (ref 0–6)
GLUCOSE BLDC GLUCOMTR-MCNC: 106 MG/DL — HIGH (ref 70–99)
GLUCOSE BLDC GLUCOMTR-MCNC: 117 MG/DL — HIGH (ref 70–99)
GLUCOSE SERPL-MCNC: 142 MG/DL — HIGH (ref 70–99)
GLUCOSE UR QL: NEGATIVE MG/DL — SIGNIFICANT CHANGE UP
HCT VFR BLD CALC: 29.8 % — LOW (ref 34.5–45)
HGB BLD-MCNC: 9.2 G/DL — LOW (ref 11.5–15.5)
IMM GRANULOCYTES NFR BLD AUTO: 1.1 % — SIGNIFICANT CHANGE UP (ref 0–1.5)
INR BLD: 1.18 RATIO — HIGH (ref 0.88–1.16)
KETONES UR-MCNC: NEGATIVE — SIGNIFICANT CHANGE UP
LACTATE SERPL-SCNC: 0.7 MMOL/L — SIGNIFICANT CHANGE UP (ref 0.7–2)
LEUKOCYTE ESTERASE UR-ACNC: (no result)
LIDOCAIN IGE QN: 122 U/L — SIGNIFICANT CHANGE UP (ref 73–393)
LYMPHOCYTES # BLD AUTO: 0.6 K/UL — LOW (ref 1–3.3)
LYMPHOCYTES # BLD AUTO: 16.6 % — SIGNIFICANT CHANGE UP (ref 13–44)
MCHC RBC-ENTMCNC: 25 PG — LOW (ref 27–34)
MCHC RBC-ENTMCNC: 30.9 GM/DL — LOW (ref 32–36)
MCV RBC AUTO: 81 FL — SIGNIFICANT CHANGE UP (ref 80–100)
MONOCYTES # BLD AUTO: 0.46 K/UL — SIGNIFICANT CHANGE UP (ref 0–0.9)
MONOCYTES NFR BLD AUTO: 12.7 % — SIGNIFICANT CHANGE UP (ref 2–14)
NEUTROPHILS # BLD AUTO: 2.5 K/UL — SIGNIFICANT CHANGE UP (ref 1.8–7.4)
NEUTROPHILS NFR BLD AUTO: 69 % — SIGNIFICANT CHANGE UP (ref 43–77)
NITRITE UR-MCNC: NEGATIVE — SIGNIFICANT CHANGE UP
NRBC # BLD: 0 /100 WBCS — SIGNIFICANT CHANGE UP (ref 0–0)
PH UR: 6 — SIGNIFICANT CHANGE UP (ref 5–8)
PLATELET # BLD AUTO: 104 K/UL — LOW (ref 150–400)
POTASSIUM SERPL-MCNC: 3.9 MMOL/L — SIGNIFICANT CHANGE UP (ref 3.5–5.3)
POTASSIUM SERPL-SCNC: 3.9 MMOL/L — SIGNIFICANT CHANGE UP (ref 3.5–5.3)
PROT SERPL-MCNC: 8.3 GM/DL — SIGNIFICANT CHANGE UP (ref 6–8.3)
PROT UR-MCNC: 30 MG/DL
PROTHROM AB SERPL-ACNC: 12.8 SEC — HIGH (ref 9.8–12.7)
RBC # BLD: 3.68 M/UL — LOW (ref 3.8–5.2)
RBC # FLD: 14.9 % — HIGH (ref 10.3–14.5)
RBC CASTS # UR COMP ASSIST: SIGNIFICANT CHANGE UP /HPF (ref 0–4)
SODIUM SERPL-SCNC: 139 MMOL/L — SIGNIFICANT CHANGE UP (ref 135–145)
SP GR SPEC: 1.01 — SIGNIFICANT CHANGE UP (ref 1.01–1.02)
TROPONIN I SERPL-MCNC: <0.015 NG/ML — SIGNIFICANT CHANGE UP (ref 0.01–0.04)
TROPONIN I SERPL-MCNC: <0.015 NG/ML — SIGNIFICANT CHANGE UP (ref 0.01–0.04)
UROBILINOGEN FLD QL: NEGATIVE MG/DL — SIGNIFICANT CHANGE UP
WBC # BLD: 3.62 K/UL — LOW (ref 3.8–10.5)
WBC # FLD AUTO: 3.62 K/UL — LOW (ref 3.8–10.5)
WBC UR QL: SIGNIFICANT CHANGE UP

## 2018-03-25 PROCEDURE — 93010 ELECTROCARDIOGRAM REPORT: CPT

## 2018-03-25 PROCEDURE — 71045 X-RAY EXAM CHEST 1 VIEW: CPT | Mod: 26

## 2018-03-25 PROCEDURE — 99285 EMERGENCY DEPT VISIT HI MDM: CPT

## 2018-03-25 PROCEDURE — 74176 CT ABD & PELVIS W/O CONTRAST: CPT | Mod: 26

## 2018-03-25 RX ORDER — DEXTROSE 50 % IN WATER 50 %
25 SYRINGE (ML) INTRAVENOUS ONCE
Qty: 0 | Refills: 0 | Status: DISCONTINUED | OUTPATIENT
Start: 2018-03-25 | End: 2018-03-28

## 2018-03-25 RX ORDER — ONDANSETRON 8 MG/1
4 TABLET, FILM COATED ORAL EVERY 6 HOURS
Qty: 0 | Refills: 0 | Status: DISCONTINUED | OUTPATIENT
Start: 2018-03-25 | End: 2018-03-28

## 2018-03-25 RX ORDER — MORPHINE SULFATE 50 MG/1
2 CAPSULE, EXTENDED RELEASE ORAL EVERY 4 HOURS
Qty: 0 | Refills: 0 | Status: DISCONTINUED | OUTPATIENT
Start: 2018-03-25 | End: 2018-03-28

## 2018-03-25 RX ORDER — ONDANSETRON 8 MG/1
4 TABLET, FILM COATED ORAL ONCE
Qty: 0 | Refills: 0 | Status: COMPLETED | OUTPATIENT
Start: 2018-03-25 | End: 2018-03-25

## 2018-03-25 RX ORDER — SODIUM CHLORIDE 9 MG/ML
1000 INJECTION, SOLUTION INTRAVENOUS
Qty: 0 | Refills: 0 | Status: DISCONTINUED | OUTPATIENT
Start: 2018-03-25 | End: 2018-03-28

## 2018-03-25 RX ORDER — PANTOPRAZOLE SODIUM 20 MG/1
40 TABLET, DELAYED RELEASE ORAL DAILY
Qty: 0 | Refills: 0 | Status: DISCONTINUED | OUTPATIENT
Start: 2018-03-25 | End: 2018-03-28

## 2018-03-25 RX ORDER — MORPHINE SULFATE 50 MG/1
2 CAPSULE, EXTENDED RELEASE ORAL ONCE
Qty: 0 | Refills: 0 | Status: DISCONTINUED | OUTPATIENT
Start: 2018-03-25 | End: 2018-03-25

## 2018-03-25 RX ORDER — SODIUM CHLORIDE 9 MG/ML
1000 INJECTION INTRAMUSCULAR; INTRAVENOUS; SUBCUTANEOUS ONCE
Qty: 0 | Refills: 0 | Status: COMPLETED | OUTPATIENT
Start: 2018-03-25 | End: 2018-03-25

## 2018-03-25 RX ORDER — DEXTROSE 50 % IN WATER 50 %
12.5 SYRINGE (ML) INTRAVENOUS ONCE
Qty: 0 | Refills: 0 | Status: DISCONTINUED | OUTPATIENT
Start: 2018-03-25 | End: 2018-03-28

## 2018-03-25 RX ORDER — HEPARIN SODIUM 5000 [USP'U]/ML
5000 INJECTION INTRAVENOUS; SUBCUTANEOUS EVERY 8 HOURS
Qty: 0 | Refills: 0 | Status: DISCONTINUED | OUTPATIENT
Start: 2018-03-25 | End: 2018-03-28

## 2018-03-25 RX ORDER — DEXTROSE 50 % IN WATER 50 %
1 SYRINGE (ML) INTRAVENOUS ONCE
Qty: 0 | Refills: 0 | Status: DISCONTINUED | OUTPATIENT
Start: 2018-03-25 | End: 2018-03-28

## 2018-03-25 RX ORDER — FAMOTIDINE 10 MG/ML
20 INJECTION INTRAVENOUS ONCE
Qty: 0 | Refills: 0 | Status: COMPLETED | OUTPATIENT
Start: 2018-03-25 | End: 2018-03-25

## 2018-03-25 RX ORDER — GLUCAGON INJECTION, SOLUTION 0.5 MG/.1ML
1 INJECTION, SOLUTION SUBCUTANEOUS ONCE
Qty: 0 | Refills: 0 | Status: DISCONTINUED | OUTPATIENT
Start: 2018-03-25 | End: 2018-03-28

## 2018-03-25 RX ORDER — INSULIN LISPRO 100/ML
VIAL (ML) SUBCUTANEOUS
Qty: 0 | Refills: 0 | Status: DISCONTINUED | OUTPATIENT
Start: 2018-03-25 | End: 2018-03-28

## 2018-03-25 RX ORDER — SODIUM CHLORIDE 9 MG/ML
3 INJECTION INTRAMUSCULAR; INTRAVENOUS; SUBCUTANEOUS ONCE
Qty: 0 | Refills: 0 | Status: COMPLETED | OUTPATIENT
Start: 2018-03-25 | End: 2018-03-25

## 2018-03-25 RX ADMIN — FAMOTIDINE 20 MILLIGRAM(S): 10 INJECTION INTRAVENOUS at 07:59

## 2018-03-25 RX ADMIN — SODIUM CHLORIDE 3 MILLILITER(S): 9 INJECTION INTRAMUSCULAR; INTRAVENOUS; SUBCUTANEOUS at 07:47

## 2018-03-25 RX ADMIN — HEPARIN SODIUM 5000 UNIT(S): 5000 INJECTION INTRAVENOUS; SUBCUTANEOUS at 23:55

## 2018-03-25 RX ADMIN — SODIUM CHLORIDE 1000 MILLILITER(S): 9 INJECTION INTRAMUSCULAR; INTRAVENOUS; SUBCUTANEOUS at 07:46

## 2018-03-25 RX ADMIN — SODIUM CHLORIDE 100 MILLILITER(S): 9 INJECTION, SOLUTION INTRAVENOUS at 13:52

## 2018-03-25 RX ADMIN — HEPARIN SODIUM 5000 UNIT(S): 5000 INJECTION INTRAVENOUS; SUBCUTANEOUS at 13:51

## 2018-03-25 RX ADMIN — ONDANSETRON 4 MILLIGRAM(S): 8 TABLET, FILM COATED ORAL at 07:58

## 2018-03-25 RX ADMIN — MORPHINE SULFATE 2 MILLIGRAM(S): 50 CAPSULE, EXTENDED RELEASE ORAL at 09:14

## 2018-03-25 RX ADMIN — PANTOPRAZOLE SODIUM 40 MILLIGRAM(S): 20 TABLET, DELAYED RELEASE ORAL at 13:51

## 2018-03-25 RX ADMIN — ONDANSETRON 4 MILLIGRAM(S): 8 TABLET, FILM COATED ORAL at 11:51

## 2018-03-25 RX ADMIN — MORPHINE SULFATE 2 MILLIGRAM(S): 50 CAPSULE, EXTENDED RELEASE ORAL at 08:53

## 2018-03-25 NOTE — ED ADULT NURSE REASSESSMENT NOTE - NS ED NURSE REASSESS COMMENT FT1
09:15 Pt medicated as per orders/reports symptom relief. O2 89% when asleep. Placed on 2 LNC. Safety maintained. Son at bedside. Awaiting CT scan.
NGT placed R nostril. Pt tolerated well. Tube to low suction. Initial OP approx 60cc thick green fluid. Safety maintained. Family at bedside. Will continue to monitor.
Report given to MADDISON So RN. Pt resting comfortably. A+Ox3 VSS. Safety maintained.
NGT to LCS with green drainage.  Patient denies nausea and abd pain.

## 2018-03-25 NOTE — ED PROVIDER NOTE - PROGRESS NOTE DETAILS
Dr. Pastrana:  Labs results reviewed, CT A/P + SBO.  Dr. Carballo/ Surgery aware of evaluation.  NGT ordered.

## 2018-03-25 NOTE — ED PROVIDER NOTE - MEDICAL DECISION MAKING DETAILS
Elderly WF BIBA w/ intermittent diffuse abd pain/tender, N/V since last dina.  Afeb.  Past sx h/o appy, jamie.  Plan:  EKG, labs, urine, cxr, IV Zofran/Pepcid, IVF, CT A/P.  Observe, reassess.

## 2018-03-25 NOTE — H&P ADULT - HISTORY OF PRESENT ILLNESS
83 Y old female with multiple medical problems, presented with diffuse upper abdominal pain colicky , intermittent  mild radiation to back. Pain is associated with nausea and vomiting x3, no fever Last BM yesterday, not sure if passing gas. No SOB, as H/o ch cough, ch sore throat was being followed up by ENT was suppose to get a biopsy this week. No diarrhea mild dysuria. H/O Ch pancytopenia. N o sick contacts h/O similar symptoms almost a year ago but SBO was not conformed then.

## 2018-03-25 NOTE — H&P ADULT - NSHPLABSRESULTS_GEN_ALL_CORE
Labs:                          9.2    3.62  )-----------( 104      ( 25 Mar 2018 07:36 )             29.8       03-25    139  |  103  |  31<H>  ----------------------------<  142<H>  3.9   |  26  |  1.20    Ca    9.4      25 Mar 2018 07:36    TPro  8.3  /  Alb  3.3  /  TBili  0.3  /  DBili  x   /  AST  12<L>  /  ALT  8<L>  /  AlkPhos  73  03-25      PT/INR - ( 25 Mar 2018 07:36 )   PT: 12.8 sec;   INR: 1.18 ratio         PTT - ( 25 Mar 2018 07:36 )  PTT:26.3 sec  Lactate, Blood (03.25.18 @ 12:25)    Lactate, Blood: 0.7 mmol/L      < from: CT Abdomen and Pelvis w/ Oral Cont (03.25.18 @ 10:53) >      BOWEL: Dilated loops of small bowel predominantly in the right lower   quadrant with associated fecalization with change of caliber in the   distal ileum compatible with a small bowel obstruction. Additional   associated minimal mesenteric edema in the right hemiabdomen with small   right sided ascites and perihepatic ascites. No definite bowel wall   thickening identified. Clot diverticulosis without diverticulitis.     PERITONEUM: Small ascites. No free air. No drainable fluid collection.  RETROPERITONEUM: Retrocaval nodular density measuring 1.8 cm may   represent a retroperitoneal lymph node versus dilated venous plexus   drainage. A nodular density seen anterior to the left renal vein   measuring 1.6 cm may represent a lymph node.      VESSELS: atherosclerotic changes.      ABDOMINAL WALL: within normal limits.  BONES: Degenerative changes and osteopenia.     IMPRESSION:     Small bowel obstruction with transition zone in the right lower quadrant.   Associated minimal mesenteric edema and right-sided ascites with the   obstruction.        < end of copied text >

## 2018-03-25 NOTE — ED PROVIDER NOTE - CONSTITUTIONAL, MLM
normal... Elderly WF, alert, no respiratory discomfort.  + Acutely ill-appearing in moderate distress due to abd pain.

## 2018-03-25 NOTE — H&P ADULT - NSHPPHYSICALEXAM_GEN_ALL_CORE
Vital Signs Last 24 Hrs  T(C): 37.4 (25 Mar 2018 15:26), Max: 37.4 (25 Mar 2018 15:26)  T(F): 99.3 (25 Mar 2018 15:26), Max: 99.3 (25 Mar 2018 15:26)  HR: 72 (25 Mar 2018 15:26) (71 - 78)  BP: 132/42 (25 Mar 2018 15:26) (126/63 - 149/63)  BP(mean): --  RR: 17 (25 Mar 2018 15:26) (16 - 20)  SpO2: 96% (25 Mar 2018 15:26) (96% - 100%)    PHYSICAL EXAM:  Constitutional: NAD  Eyes:  WNL  ENMT:  WNL  Neck:  WNL, non tender  Back: Non tender  Respiratory: CTABL  Cardiovascular:  S1+S2+0  Gastrointestinal: Soft, mild distension, RLQ tenderness, mild guarding.  Genitourinary:  WNL  Extremities: NV intact  Vascular:  Intact  Neurological: No focal neurological deficit,  CN, motor and sensory system grossly intact.  Skin: WNL  Musculoskeletal: WNL  Psychiatric: Grossly WNL

## 2018-03-25 NOTE — H&P ADULT - ASSESSMENT
83 Y old female with SBO    NPO  IV hydration  DVT /GI prophylaxis  Serial abdominal exam  NGT  Medical consult  Pulmonology consult  Serial labs  Pt will be monitored and is aware may need surgical intervention o f conservative management is not successful

## 2018-03-25 NOTE — ED PROVIDER NOTE - OBJECTIVE STATEMENT
82 yo WF, PMH of HTN, DM II on metformin, HLD, chr. pancytopenia, chr. cough, GERD, CKD, s/p appy & jamie., 3 mos. sore throat w/ decreased po intake undergoing outpt ENT w/u, adm. [facility] ~ 1 year ago for upper/mid abd. pain (inpt w/u w/ apparent specific Dx), BIBA from home c/o'ing diffuse upper abd. pain, N/V.  Pt is [place]-speaking, son as .  Pain onset last dina: + "twisting" sensation, intermittent, severe, non-radiating, + assoc. N w/ V ~ 3 X (NBNB).  + General weak.  No SOB, F/C, D, back pain, urine c/o's.  Slight brief chest discomfort 3 dd. ago, none thereafter.  Abd pain somewhat similar to that experienced 1 yr when in [facility].  All: PCN.   PCP: Catrachita Wright

## 2018-03-25 NOTE — H&P ADULT - NSHPREVIEWOFSYSTEMS_GEN_ALL_CORE
ROS:.  [X] A ten-point review of systems was otherwise negative except as noted.  Systemic:	[ ] Fever	[ ] Chills	[ ] Night sweats    [ ] Fatigue	[ X] Other dysphagia  [] Cardiovascular:  [X] Pulmonary: Cough   [] Renal/Urologic:  [X] Gastrointestinal: abdominal pain, vomiting  [] Metabolic:  [] Neurologic:  [] Hematologic:  [] ENT:  [] Ophthalmologic:  [] Musculoskeletal:    [ ] Due to altered mental status/intubation, subjective information were not able to be obtained from the patient. History was obtained, to the extent possible, from review of the chart and collateral sources of information.

## 2018-03-25 NOTE — ED PROVIDER NOTE - NEUROLOGICAL, MLM
Alert and oriented, no focal deficits, no motor or sensory deficits.  CNs grossly intact.  Normal speech.

## 2018-03-26 LAB
ANION GAP SERPL CALC-SCNC: 6 MMOL/L — SIGNIFICANT CHANGE UP (ref 5–17)
ANISOCYTOSIS BLD QL: SLIGHT — SIGNIFICANT CHANGE UP
BASOPHILS # BLD AUTO: 0 K/UL — SIGNIFICANT CHANGE UP (ref 0–0.2)
BASOPHILS NFR BLD AUTO: 0 % — SIGNIFICANT CHANGE UP (ref 0–2)
BUN SERPL-MCNC: 21 MG/DL — SIGNIFICANT CHANGE UP (ref 7–23)
CALCIUM SERPL-MCNC: 8.7 MG/DL — SIGNIFICANT CHANGE UP (ref 8.5–10.1)
CHLORIDE SERPL-SCNC: 106 MMOL/L — SIGNIFICANT CHANGE UP (ref 96–108)
CO2 SERPL-SCNC: 28 MMOL/L — SIGNIFICANT CHANGE UP (ref 22–31)
CREAT SERPL-MCNC: 1.13 MG/DL — SIGNIFICANT CHANGE UP (ref 0.5–1.3)
ELLIPTOCYTES BLD QL SMEAR: SLIGHT — SIGNIFICANT CHANGE UP
EOSINOPHIL # BLD AUTO: 0.01 K/UL — SIGNIFICANT CHANGE UP (ref 0–0.5)
EOSINOPHIL NFR BLD AUTO: 0.5 % — SIGNIFICANT CHANGE UP (ref 0–6)
GLUCOSE BLDC GLUCOMTR-MCNC: 102 MG/DL — HIGH (ref 70–99)
GLUCOSE BLDC GLUCOMTR-MCNC: 92 MG/DL — SIGNIFICANT CHANGE UP (ref 70–99)
GLUCOSE BLDC GLUCOMTR-MCNC: 96 MG/DL — SIGNIFICANT CHANGE UP (ref 70–99)
GLUCOSE SERPL-MCNC: 93 MG/DL — SIGNIFICANT CHANGE UP (ref 70–99)
HBA1C BLD-MCNC: 6.2 % — HIGH (ref 4–5.6)
HCT VFR BLD CALC: 23.6 % — LOW (ref 34.5–45)
HGB BLD-MCNC: 7.2 G/DL — LOW (ref 11.5–15.5)
HYPOCHROMIA BLD QL: SLIGHT — SIGNIFICANT CHANGE UP
IMM GRANULOCYTES NFR BLD AUTO: 0.5 % — SIGNIFICANT CHANGE UP (ref 0–1.5)
LACTATE SERPL-SCNC: 0.5 MMOL/L — LOW (ref 0.7–2)
LYMPHOCYTES # BLD AUTO: 0.58 K/UL — LOW (ref 1–3.3)
LYMPHOCYTES # BLD AUTO: 29.9 % — SIGNIFICANT CHANGE UP (ref 13–44)
MANUAL SMEAR VERIFICATION: SIGNIFICANT CHANGE UP
MCHC RBC-ENTMCNC: 25.3 PG — LOW (ref 27–34)
MCHC RBC-ENTMCNC: 30.5 GM/DL — LOW (ref 32–36)
MCV RBC AUTO: 82.8 FL — SIGNIFICANT CHANGE UP (ref 80–100)
MICROCYTES BLD QL: SIGNIFICANT CHANGE UP
MONOCYTES # BLD AUTO: 0.36 K/UL — SIGNIFICANT CHANGE UP (ref 0–0.9)
MONOCYTES NFR BLD AUTO: 18.6 % — HIGH (ref 2–14)
NEUTROPHILS # BLD AUTO: 0.98 K/UL — LOW (ref 1.8–7.4)
NEUTROPHILS NFR BLD AUTO: 50.5 % — SIGNIFICANT CHANGE UP (ref 43–77)
NRBC # BLD: 0 /100 WBCS — SIGNIFICANT CHANGE UP (ref 0–0)
PLAT MORPH BLD: NORMAL — SIGNIFICANT CHANGE UP
PLATELET # BLD AUTO: 80 K/UL — LOW (ref 150–400)
POIKILOCYTOSIS BLD QL AUTO: SLIGHT — SIGNIFICANT CHANGE UP
POTASSIUM SERPL-MCNC: 3.9 MMOL/L — SIGNIFICANT CHANGE UP (ref 3.5–5.3)
POTASSIUM SERPL-SCNC: 3.9 MMOL/L — SIGNIFICANT CHANGE UP (ref 3.5–5.3)
RBC # BLD: 2.85 M/UL — LOW (ref 3.8–5.2)
RBC # FLD: 14.8 % — HIGH (ref 10.3–14.5)
RBC BLD AUTO: (no result)
SODIUM SERPL-SCNC: 140 MMOL/L — SIGNIFICANT CHANGE UP (ref 135–145)
WBC # BLD: 1.94 K/UL — LOW (ref 3.8–10.5)
WBC # FLD AUTO: 1.94 K/UL — LOW (ref 3.8–10.5)

## 2018-03-26 PROCEDURE — 74019 RADEX ABDOMEN 2 VIEWS: CPT | Mod: 26

## 2018-03-26 RX ORDER — PHENOL/SODIUM PHENOLATE
2 AEROSOL, SPRAY (ML) MUCOUS MEMBRANE
Qty: 0 | Refills: 0 | Status: DISCONTINUED | OUTPATIENT
Start: 2018-03-26 | End: 2018-03-28

## 2018-03-26 RX ORDER — LIDOCAINE 4 G/100G
5 CREAM TOPICAL THREE TIMES A DAY
Qty: 0 | Refills: 0 | Status: DISCONTINUED | OUTPATIENT
Start: 2018-03-26 | End: 2018-03-28

## 2018-03-26 RX ADMIN — SODIUM CHLORIDE 100 MILLILITER(S): 9 INJECTION, SOLUTION INTRAVENOUS at 21:15

## 2018-03-26 RX ADMIN — Medication 2 SPRAY(S): at 12:37

## 2018-03-26 RX ADMIN — SODIUM CHLORIDE 100 MILLILITER(S): 9 INJECTION, SOLUTION INTRAVENOUS at 00:08

## 2018-03-26 RX ADMIN — SODIUM CHLORIDE 100 MILLILITER(S): 9 INJECTION, SOLUTION INTRAVENOUS at 11:47

## 2018-03-26 RX ADMIN — HEPARIN SODIUM 5000 UNIT(S): 5000 INJECTION INTRAVENOUS; SUBCUTANEOUS at 06:00

## 2018-03-26 RX ADMIN — LIDOCAINE 5 MILLILITER(S): 4 CREAM TOPICAL at 16:19

## 2018-03-26 RX ADMIN — PANTOPRAZOLE SODIUM 40 MILLIGRAM(S): 20 TABLET, DELAYED RELEASE ORAL at 11:46

## 2018-03-26 RX ADMIN — HEPARIN SODIUM 5000 UNIT(S): 5000 INJECTION INTRAVENOUS; SUBCUTANEOUS at 14:18

## 2018-03-26 NOTE — CONSULT NOTE ADULT - SUBJECTIVE AND OBJECTIVE BOX
HPI:    83 Y.o.f. admitted with diffuse upper abdominal pain colicky, associated with nausea and vomiting x3, pat is dx with bowel obstruction & plan conservative Rx vs surgery, ask to see for pulmonary with H/o chronic cough, chronic sore throat seen by ENT & waiting for biopsy this week. pat with NG tube, no new complaint, still sore throat.    PAST MEDICAL & SURGICAL HISTORY:  CKD (chronic kidney disease) stage 3, GFR 30-59 ml/min  Anemia, unspecified type  GERD (gastroesophageal reflux disease)  High cholesterol  Diabetes  HTN (hypertension)  H/O: hysterectomy  S/P cholecystectomy  S/P appendectomy      Home Medications:  benzonatate 200 mg oral capsule: 1 cap(s) orally 3 times a day, As Needed (2017 18:26)  calcium-vitamin D 600 mg-500 intl units oral tablet, extended release: 2 tab(s) orally once a day (in the morning) (2017 18:27)  fluticasone 0.5 mg/2 mL inhalation suspension:  inhaled  (2017 18:26)  homatropine-hydrocodone 1.5 mg-5 mg/5 mL oral syrup: 5 milliliter(s) orally every 6 hours, As needed, worse Cough (2017 15:39)  ipratropium 21 mcg/inh (0.03%) nasal spray: 2 spray(s) nasal 3 times a day (2017 18:28)  metFORMIN 500 mg oral tablet: 1 tab(s) orally 2 times a day (2017 15:07)  omeprazole 40 mg oral delayed release capsule: 1 cap(s) orally once a day (2017 18:27)      MEDICATIONS  (STANDING):  dextrose 5%. 1000 milliLiter(s) (50 mL/Hr) IV Continuous <Continuous>  dextrose 50% Injectable 12.5 Gram(s) IV Push once  dextrose 50% Injectable 25 Gram(s) IV Push once  dextrose 50% Injectable 25 Gram(s) IV Push once  heparin  Injectable 5000 Unit(s) SubCutaneous every 8 hours  insulin lispro (HumaLOG) corrective regimen sliding scale   SubCutaneous three times a day before meals  lactated ringers. 1000 milliLiter(s) (100 mL/Hr) IV Continuous <Continuous>  pantoprazole  Injectable 40 milliGRAM(s) IV Push daily    MEDICATIONS  (PRN):  dextrose Gel 1 Dose(s) Oral once PRN Blood Glucose LESS THAN 70 milliGRAM(s)/deciliter  glucagon  Injectable 1 milliGRAM(s) IntraMuscular once PRN Glucose LESS THAN 70 milligrams/deciliter  morphine  - Injectable 2 milliGRAM(s) IV Push every 4 hours PRN Moderate Pain (4 - 6)  ondansetron Injectable 4 milliGRAM(s) IV Push every 6 hours PRN Nausea      Allergies    penicillin (Rash)    Intolerances        SOCIAL HISTORY: Denies tobacco, etoh abuse or illicit drug use    FAMILY HISTORY:  No pertinent family history in first degree relatives      Vital Signs Last 24 Hrs  T(C): 36.8 (26 Mar 2018 04:59), Max: 37.4 (25 Mar 2018 15:26)  T(F): 98.3 (26 Mar 2018 04:59), Max: 99.3 (25 Mar 2018 15:26)  HR: 72 (26 Mar 2018 04:59) (71 - 76)  BP: 114/74 (26 Mar 2018 04:59) (114/74 - 132/42)  BP(mean): --  RR: 17 (25 Mar 2018 15:26) (17 - 20)  SpO2: 98% (26 Mar 2018 04:59) (96% - 100%)        REVIEW OF SYSTEMS:    CONSTITUTIONAL:  As per HPI.  HEENT:  Eyes:  No diplopia or blurred vision. ENT:  No earache, sore throat or runny nose.  CARDIOVASCULAR:  No pressure, squeezing, tightness, heaviness or aching about the chest, neck, axilla or epigastrium.  RESPIRATORY:  No cough, shortness of breath, PND or orthopnea.  GASTROINTESTINAL:  No nausea, vomiting or diarrhea.  GENITOURINARY:  No dysuria, frequency or urgency.  MUSCULOSKELETAL:  As per HPI.  SKIN:  No change in skin, hair or nails.  NEUROLOGIC:  No paresthesias, fasciculations, seizures or weakness.  PSYCHIATRIC:  No disorder of thought or mood.  ENDOCRINE:  No heat or cold intolerance, polyuria or polydipsia.  HEMATOLOGICAL:  No easy bruising or bleedings:  .     PHYSICAL EXAMINATION:    GENERAL APPEARANCE:  Pt. is not currently dyspneic, in no distress. Pt. is alert, oriented, and pleasant.  HEENT:  Pupils are normal and react normally. No icterus. Mucous membranes well colored.  NECK:  Supple. No lymphadenopathy. Jugular venous pressure not elevated. Carotids equal.   HEART:   The cardiac impulse has a normal quality. Regular. Normal S1 and S2. There are no murmurs, rubs or gallops noted  CHEST:  Chest is clear to auscultation. Normal respiratory effort.  ABDOMEN:  Soft and nontender.   EXTREMITIES:  There is no cyanosis, clubbing or edema.   SKIN:  No rash or significant lesions are noted.    LABS:                        7.2    1.94  )-----------( 80       ( 26 Mar 2018 07:51 )             23.6         140  |  106  |  21  ----------------------------<  93  3.9   |  28  |  1.13    Ca    8.7      26 Mar 2018 07:51    TPro  8.3  /  Alb  3.3  /  TBili  0.3  /  DBili  x   /  AST  12<L>  /  ALT  8<L>  /  AlkPhos  73  03-25    LIVER FUNCTIONS - ( 25 Mar 2018 07:36 )  Alb: 3.3 g/dL / Pro: 8.3 gm/dL / ALK PHOS: 73 U/L / ALT: 8 U/L / AST: 12 U/L / GGT: x           PT/INR - ( 25 Mar 2018 07:36 )   PT: 12.8 sec;   INR: 1.18 ratio         PTT - ( 25 Mar 2018 07:36 )  PTT:26.3 sec  CARDIAC MARKERS ( 25 Mar 2018 10:59 )  <0.015 ng/mL / x     / x     / x     / x      CARDIAC MARKERS ( 25 Mar 2018 07:36 )  <0.015 ng/mL / x     / 22 U/L / x     / x          Urinalysis Basic - ( 25 Mar 2018 08:23 )    Color: Yellow / Appearance: Clear / S.010 / pH: x  Gluc: x / Ketone: Negative  / Bili: Negative / Urobili: Negative mg/dL   Blood: x / Protein: 30 mg/dL / Nitrite: Negative   Leuk Esterase: Trace / RBC: 10 to 12 /HPF / WBC 3-5   Sq Epi: x / Non Sq Epi: x / Bacteria: Few    RADIOLOGY & ADDITIONAL STUDIES:     Chest 1 View AP/PA. (18 @ 07:53) >  Impression:    No acute disease    CT Abdomen and Pelvis w/ Oral Cont (18 @ 10:53) >  IMPRESSION:     Small bowel obstruction with transition zone in the right lower quadrant.   Associated minimal mesenteric edema and right-sided ascites with the   obstruction.

## 2018-03-26 NOTE — CONSULT NOTE ADULT - ASSESSMENT
PROBLEMS:    SBO  CHRONIC COUGH  SORE THROAT- ENT PATH  Chronic kidney disease  Anemia, unspecified type  GERD   High cholesterol  Diabetes  HTN    PLAN;    NPO  SURGERY FU  PULMONARY OPTIMIZED  AEROSOLS  DVT PROPHYLASIX

## 2018-03-26 NOTE — CONSULT NOTE ADULT - SUBJECTIVE AND OBJECTIVE BOX
83 Y old female with multiple medical problems, presented with diffuse upper abdominal pain colicky , intermittent  mild radiation to back. Pain is associated with nausea and vomiting x3, no fever Last BM yesterday, not sure if passing gas. No SOB, as H/o ch cough, ch sore throat was being followed up by ENT was suppose to get a biopsy 3/26. No diarrhea mild dysuria. H/O Ch pancytopenia. N o sick contacts h/O similar symptoms almost a year ago but SBO was not conformed then. (25 Mar 2018 15:21)    Medicine was asked for consultation regarding comorbidities. Currently pt complaining of sore throat. denies nausea.   Grandson provided translation.      PAST MEDICAL & SURGICAL HISTORY:  CKD (chronic kidney disease) stage 3, GFR 30-59 ml/min  Anemia, unspecified type  GERD (gastroesophageal reflux disease)  High cholesterol  Diabetes  HTN (hypertension)  H/O: hysterectomy  S/P cholecystectomy  S/P appendectomy      FAMILY HISTORY:  No pertinent family history in first degree relatives      Social history:    REVIEW OF SYSTEMS:  CONSTITUTIONAL: No weakness, fevers or chills  EYES/ENT: No visual changes;  No vertigo. + throat pain   NECK: No pain or stiffness  RESPIRATORY: No cough, wheezing, hemoptysis; No shortness of breath  CARDIOVASCULAR: No chest pain or palpitations  GASTROINTESTINAL: No epigastric pain. mild abdominal pain. No nausea, vomiting, or hematemesis; No diarrhea or constipation. No melena or hematochezia.  GENITOURINARY: No dysuria, frequency or hematuria  NEUROLOGICAL: No numbness or weakness  SKIN: No itching, burning, rashes, or lesions   All other review of systems is negative unless indicated above    Vital Signs Last 24 Hrs  T(C): 37.2 (26 Mar 2018 12:01), Max: 37.4 (25 Mar 2018 15:26)  T(F): 98.9 (26 Mar 2018 12:01), Max: 99.3 (25 Mar 2018 15:26)  HR: 73 (26 Mar 2018 12:01) (72 - 73)  BP: 136/47 (26 Mar 2018 12:01) (114/74 - 136/47)  BP(mean): --  RR: 17 (26 Mar 2018 12:01) (17 - 17)  SpO2: 100% (26 Mar 2018 12:01) (96% - 100%)    I&O's Summary    25 Mar 2018 07:01  -  26 Mar 2018 07:00  --------------------------------------------------------  IN: 1000 mL / OUT: 400 mL / NET: 600 mL        CAPILLARY BLOOD GLUCOSE      POCT Blood Glucose.: 96 mg/dL (26 Mar 2018 11:46)  POCT Blood Glucose.: 102 mg/dL (26 Mar 2018 06:03)  POCT Blood Glucose.: 106 mg/dL (25 Mar 2018 23:54)      PHYSICAL EXAM:    Constitutional: NAD, awake and alert, well-developed  HEENT: PERR, EOMI, Normal Hearing, MMM. NGT  Neck: Soft and supple, No LAD, No JVD  Respiratory: Breath sounds are clear bilaterally, No wheezing, rales or rhonchi  Cardiovascular: S1 and S2, regular rate and rhythm, no Murmurs, gallops or rubs  Gastrointestinal: Bowel Sounds present, soft, nontender, nondistended, no guarding, no rebound  Extremities: No peripheral edema  Vascular: 2+ peripheral pulses  Neurological: A/O x 3, no focal deficits  Musculoskeletal: 5/5 strength b/l upper and lower extremities  Skin: No rashes    penicillin (Rash)      MEDICATIONS:  MEDICATIONS  (STANDING):  dextrose 5%. 1000 milliLiter(s) (50 mL/Hr) IV Continuous <Continuous>  dextrose 50% Injectable 12.5 Gram(s) IV Push once  dextrose 50% Injectable 25 Gram(s) IV Push once  dextrose 50% Injectable 25 Gram(s) IV Push once  heparin  Injectable 5000 Unit(s) SubCutaneous every 8 hours  insulin lispro (HumaLOG) corrective regimen sliding scale   SubCutaneous three times a day before meals  lactated ringers. 1000 milliLiter(s) (100 mL/Hr) IV Continuous <Continuous>  pantoprazole  Injectable 40 milliGRAM(s) IV Push daily      LABS: All Labs Reviewed:                        7.2    1.94  )-----------( 80       ( 26 Mar 2018 07:51 )             23.6     03-26    140  |  106  |  21  ----------------------------<  93  3.9   |  28  |  1.13    Ca    8.7      26 Mar 2018 07:51    TPro  8.3  /  Alb  3.3  /  TBili  0.3  /  DBili  x   /  AST  12<L>  /  ALT  8<L>  /  AlkPhos  73  03-25    PT/INR - ( 25 Mar 2018 07:36 )   PT: 12.8 sec;   INR: 1.18 ratio         PTT - ( 25 Mar 2018 07:36 )  PTT:26.3 sec  CARDIAC MARKERS ( 25 Mar 2018 10:59 )  <0.015 ng/mL / x     / x     / x     / x      CARDIAC MARKERS ( 25 Mar 2018 07:36 )  <0.015 ng/mL / x     / 22 U/L / x     / x          ASSESSMENT:  83 Y old female with multiple medical problems, presented with diffuse upper abdominal pain colicky , intermittent  mild radiation to back. Pain is associated with nausea and vomiting x3, no fever Last BM yesterday, not sure if passing gas. No SOB, as H/o ch cough, ch sore throat was being followed up by ENT was suppose to get a biopsy 3/26. No diarrhea mild dysuria. H/O Ch pancytopenia. N o sick contacts h/O similar symptoms almost a year ago but SBO was not conformed then. Medicine was asked for consultation regarding comorbidities.     *Acute on Chronic Throat Pain  -baseline chronic pain, now worse with NGT  -symptom control with chloroseptic spray  -hope for NGT removal soon (minimal output)  -Outpatient ENT follow up with Dr Mariano Gamboa [phone]. Biopsy of nodules at base of tongue scheduled for 3/26. Family requesting ENT consult in house. Defer to primary. Did call and notify outpatient ENT of admission.    *Pancytopenia  -chronic per ED   -call PCP to verify last counts    *DM type II without complication  -A1C 6.2  -glucophage PTA    *SBO  -Risk factors: multiple abdominal surgeries  -CTAP: Small bowel obstruction with transition zone RLQ. Associated minimal mesenteric edema and right-sided ascites with the obstruction.  -NPO, NGT  -Surgery primary      thank you for the consult. will continue to follow.

## 2018-03-27 DIAGNOSIS — D64.9 ANEMIA, UNSPECIFIED: ICD-10-CM

## 2018-03-27 DIAGNOSIS — K56.609 UNSPECIFIED INTESTINAL OBSTRUCTION, UNSPECIFIED AS TO PARTIAL VERSUS COMPLETE OBSTRUCTION: ICD-10-CM

## 2018-03-27 LAB
GLUCOSE BLDC GLUCOMTR-MCNC: 121 MG/DL — HIGH (ref 70–99)
GLUCOSE BLDC GLUCOMTR-MCNC: 155 MG/DL — HIGH (ref 70–99)
GLUCOSE BLDC GLUCOMTR-MCNC: 88 MG/DL — SIGNIFICANT CHANGE UP (ref 70–99)
GLUCOSE BLDC GLUCOMTR-MCNC: 93 MG/DL — SIGNIFICANT CHANGE UP (ref 70–99)
GLUCOSE BLDC GLUCOMTR-MCNC: 95 MG/DL — SIGNIFICANT CHANGE UP (ref 70–99)
HCT VFR BLD CALC: 24.8 % — LOW (ref 34.5–45)
HGB BLD-MCNC: 7.3 G/DL — LOW (ref 11.5–15.5)
MCHC RBC-ENTMCNC: 24.6 PG — LOW (ref 27–34)
MCHC RBC-ENTMCNC: 29.4 GM/DL — LOW (ref 32–36)
MCV RBC AUTO: 83.5 FL — SIGNIFICANT CHANGE UP (ref 80–100)
NRBC # BLD: 0 /100 WBCS — SIGNIFICANT CHANGE UP (ref 0–0)
PLATELET # BLD AUTO: 78 K/UL — LOW (ref 150–400)
RBC # BLD: 2.97 M/UL — LOW (ref 3.8–5.2)
RBC # FLD: 15 % — HIGH (ref 10.3–14.5)
WBC # BLD: 1.84 K/UL — LOW (ref 3.8–10.5)
WBC # FLD AUTO: 1.84 K/UL — LOW (ref 3.8–10.5)

## 2018-03-27 PROCEDURE — 74250 X-RAY XM SM INT 1CNTRST STD: CPT | Mod: 26

## 2018-03-27 RX ADMIN — Medication 1: at 12:33

## 2018-03-27 RX ADMIN — PANTOPRAZOLE SODIUM 40 MILLIGRAM(S): 20 TABLET, DELAYED RELEASE ORAL at 12:30

## 2018-03-27 RX ADMIN — SODIUM CHLORIDE 100 MILLILITER(S): 9 INJECTION, SOLUTION INTRAVENOUS at 08:21

## 2018-03-27 RX ADMIN — LIDOCAINE 5 MILLILITER(S): 4 CREAM TOPICAL at 05:33

## 2018-03-27 RX ADMIN — SODIUM CHLORIDE 100 MILLILITER(S): 9 INJECTION, SOLUTION INTRAVENOUS at 18:30

## 2018-03-27 RX ADMIN — HEPARIN SODIUM 5000 UNIT(S): 5000 INJECTION INTRAVENOUS; SUBCUTANEOUS at 05:33

## 2018-03-27 RX ADMIN — HEPARIN SODIUM 5000 UNIT(S): 5000 INJECTION INTRAVENOUS; SUBCUTANEOUS at 13:30

## 2018-03-28 ENCOUNTER — TRANSCRIPTION ENCOUNTER (OUTPATIENT)
Age: 83
End: 2018-03-28

## 2018-03-28 VITALS
HEART RATE: 66 BPM | SYSTOLIC BLOOD PRESSURE: 130 MMHG | DIASTOLIC BLOOD PRESSURE: 47 MMHG | OXYGEN SATURATION: 95 % | TEMPERATURE: 99 F | RESPIRATION RATE: 16 BRPM

## 2018-03-28 LAB
ANION GAP SERPL CALC-SCNC: 8 MMOL/L — SIGNIFICANT CHANGE UP (ref 5–17)
BUN SERPL-MCNC: 13 MG/DL — SIGNIFICANT CHANGE UP (ref 7–23)
CALCIUM SERPL-MCNC: 8.6 MG/DL — SIGNIFICANT CHANGE UP (ref 8.5–10.1)
CHLORIDE SERPL-SCNC: 104 MMOL/L — SIGNIFICANT CHANGE UP (ref 96–108)
CO2 SERPL-SCNC: 28 MMOL/L — SIGNIFICANT CHANGE UP (ref 22–31)
CREAT SERPL-MCNC: 0.94 MG/DL — SIGNIFICANT CHANGE UP (ref 0.5–1.3)
GLUCOSE BLDC GLUCOMTR-MCNC: 160 MG/DL — HIGH (ref 70–99)
GLUCOSE BLDC GLUCOMTR-MCNC: 97 MG/DL — SIGNIFICANT CHANGE UP (ref 70–99)
GLUCOSE SERPL-MCNC: 97 MG/DL — SIGNIFICANT CHANGE UP (ref 70–99)
HCT VFR BLD CALC: 20.4 % — CRITICAL LOW (ref 34.5–45)
HCT VFR BLD CALC: 21 % — CRITICAL LOW (ref 34.5–45)
HGB BLD-MCNC: 6.2 G/DL — CRITICAL LOW (ref 11.5–15.5)
HGB BLD-MCNC: 6.5 G/DL — CRITICAL LOW (ref 11.5–15.5)
MCHC RBC-ENTMCNC: 24.9 PG — LOW (ref 27–34)
MCHC RBC-ENTMCNC: 30.4 GM/DL — LOW (ref 32–36)
MCV RBC AUTO: 81.9 FL — SIGNIFICANT CHANGE UP (ref 80–100)
NRBC # BLD: 0 /100 WBCS — SIGNIFICANT CHANGE UP (ref 0–0)
PLATELET # BLD AUTO: 68 K/UL — LOW (ref 150–400)
POTASSIUM SERPL-MCNC: 3.6 MMOL/L — SIGNIFICANT CHANGE UP (ref 3.5–5.3)
POTASSIUM SERPL-SCNC: 3.6 MMOL/L — SIGNIFICANT CHANGE UP (ref 3.5–5.3)
RBC # BLD: 2.49 M/UL — LOW (ref 3.8–5.2)
RBC # FLD: 14.7 % — HIGH (ref 10.3–14.5)
SODIUM SERPL-SCNC: 140 MMOL/L — SIGNIFICANT CHANGE UP (ref 135–145)
WBC # BLD: 1.48 K/UL — LOW (ref 3.8–10.5)
WBC # FLD AUTO: 1.48 K/UL — LOW (ref 3.8–10.5)

## 2018-03-28 RX ORDER — POLYETHYLENE GLYCOL 3350 17 G/17G
17 POWDER, FOR SOLUTION ORAL
Qty: 0 | Refills: 0 | COMMUNITY
Start: 2018-03-28

## 2018-03-28 RX ORDER — POLYETHYLENE GLYCOL 3350 17 G/17G
17 POWDER, FOR SOLUTION ORAL DAILY
Qty: 0 | Refills: 0 | Status: DISCONTINUED | OUTPATIENT
Start: 2018-03-28 | End: 2018-03-28

## 2018-03-28 RX ADMIN — SODIUM CHLORIDE 100 MILLILITER(S): 9 INJECTION, SOLUTION INTRAVENOUS at 04:36

## 2018-03-28 RX ADMIN — PANTOPRAZOLE SODIUM 40 MILLIGRAM(S): 20 TABLET, DELAYED RELEASE ORAL at 12:19

## 2018-03-28 RX ADMIN — Medication 1: at 08:40

## 2018-03-28 RX ADMIN — Medication 200 MILLIGRAM(S): at 00:29

## 2018-03-28 NOTE — DISCHARGE NOTE ADULT - HOSPITAL COURSE
83 Y old female with SBO,  now resolved, having GI function, tolerating diet. ch cough, pancytopenia, HCT 21 this morning, stable on repeat, no signs of acute bleeding, HD stable.  Repeat hct  stable, was offered more monitoring and possible blood transfusion, Pt is refusing any transfusion.  D/W family , the want Pt to be discharged if she is tolerating diet.

## 2018-03-28 NOTE — DISCHARGE NOTE ADULT - CARE PROVIDER_API CALL
PMD,   Phone: (   )    -  Fax: (   )    -    primary pulmonologist, hematologust,   Phone: (   )    -  Fax: (   )    -    Kayli Carballo (HELIO), Surgery; Surgical Critical Care  [address] [place] [address]  Phone: [phone]  Fax: [phone]

## 2018-03-28 NOTE — DISCHARGE NOTE ADULT - CARE PLAN
Principal Discharge DX:	Small bowel obstruction  Goal:	Resolved  Assessment and plan of treatment:	Avoid constipation, drinks plenty of fluids, diet as tolerated, seek medical attention if develops , fever, nausea, vomiting, worsening pain, inability to pass gas or BM.  Secondary Diagnosis:	Anemia, unspecified type  Goal:	follow up  Assessment and plan of treatment:	resume home iron, follow up with PMD, hematologist.

## 2018-03-28 NOTE — DISCHARGE NOTE ADULT - MEDICATION SUMMARY - MEDICATIONS TO TAKE
I will START or STAY ON the medications listed below when I get home from the hospital:    metFORMIN 500 mg oral tablet  -- 1 tab(s) by mouth 2 times a day  -- Indication: For home med    benzonatate 200 mg oral capsule  -- 1 cap(s) by mouth 3 times a day, As Needed  -- Indication: For home med    ProAir HFA 90 mcg/inh inhalation aerosol  -- 2 puff(s) inhaled every 4 hours  -- For inhalation only.  It is very important that you take or use this exactly as directed.  Do not skip doses or discontinue unless directed by your doctor.  Obtain medical advice before taking any non-prescription drugs as some may affect the action of this medication.  Shake well before use.    -- Indication: For home med    guaiFENesin 100 mg/5 mL oral liquid  -- 10 milliliter(s) by mouth every 6 hours, As needed, Cough  -- Indication: For cough    bisacodyl 5 mg oral delayed release tablet  -- 1 tab(s) by mouth once a day (at bedtime)  -- Indication: For constipation    polyethylene glycol 3350 oral powder for reconstitution  -- 17 gram(s) by mouth once a day  -- Indication: For constipation    ipratropium 21 mcg/inh (0.03%) nasal spray  -- 2 spray(s) into nose 3 times a day  -- Indication: For home emed    omeprazole 40 mg oral delayed release capsule  -- 1 cap(s) by mouth once a day  -- Indication: For home emd    fluticasone 0.5 mg/2 mL inhalation suspension  --  inhaled   -- Indication: For home emd    homatropine-hydrocodone 1.5 mg-5 mg/5 mL oral syrup  -- 5 milliliter(s) by mouth every 6 hours, As needed, worse Cough  -- Indication: For home med    calcium-vitamin D 600 mg-500 intl units oral tablet, extended release  -- 2 tab(s) by mouth once a day (in the morning)  -- Indication: For home med

## 2018-03-28 NOTE — DISCHARGE NOTE ADULT - PLAN OF CARE
Resolved Avoid constipation, drinks plenty of fluids, diet as tolerated, seek medical attention if develops , fever, nausea, vomiting, worsening pain, inability to pass gas or BM. follow up resume home iron, follow up with PMD, hematologist.

## 2018-03-28 NOTE — PROGRESS NOTE ADULT - SUBJECTIVE AND OBJECTIVE BOX
CC: abd pain    HPI: 83 Y old female with multiple medical problems, presented with diffuse upper abdominal pain colicky , intermittent  mild radiation to back. Pain is associated with nausea and vomiting x3, no fever Last BM yesterday, not sure if passing gas. No SOB, as H/o ch cough, ch sore throat was being followed up by ENT was suppose to get a biopsy 3/26. No diarrhea mild dysuria. H/O Ch pancytopenia. N o sick contacts h/O similar symptoms almost a year ago but SBO was not conformed then. (25 Mar 2018 15:21).  Medicine was asked for consultation regarding comorbidities. Currently pt complaining of sore throat. denies nausea.     Subjective:  NGT out, XR w improving/resolved obstruction.  Grandson provided translation- pt reports diarrhea + gas O/N.  feels thirsty. no other complaints presently.  Chart reviewed, pt had extensive workup for etio of pancytopenia which was unrevealing.      Vital Signs Last 24 Hrs  T(C): 37.1 (27 Mar 2018 10:50), Max: 37.6 (26 Mar 2018 17:34)  T(F): 98.8 (27 Mar 2018 10:50), Max: 99.7 (26 Mar 2018 17:34)  HR: 68 (27 Mar 2018 10:50) (68 - 86)  BP: 113/42 (27 Mar 2018 10:50) (113/42 - 147/51)  BP(mean): --  RR: 18 (27 Mar 2018 10:50) (17 - 18)  SpO2: 95% (27 Mar 2018 10:50) (95% - 100%)    PHYSICAL EXAM:  Constitutional: NAD, awake and alert, well-developed  HEENT: PERR, EOMI, Normal Hearing, MMM.  Neck: Soft and supple, No LAD, No JVD  Respiratory: Breath sounds are clear bilaterally, No wheezing, rales or rhonchi  Cardiovascular: S1 and S2, regular rate and rhythm, no Murmurs, gallops or rubs  Gastrointestinal: Bowel Sounds present, soft, nontender, nondistended, no guarding, no rebound  Extremities: No peripheral edema  Vascular: 2+ peripheral pulses  Neurological: A/O x 3, no focal deficits  Musculoskeletal: 5/5 strength b/l upper and lower extremities  Skin: No rashes    MEDICATIONS:  MEDICATIONS  (STANDING):  dextrose 5%. 1000 milliLiter(s) (50 mL/Hr) IV Continuous <Continuous>  dextrose 50% Injectable 12.5 Gram(s) IV Push once  dextrose 50% Injectable 25 Gram(s) IV Push once  dextrose 50% Injectable 25 Gram(s) IV Push once  heparin  Injectable 5000 Unit(s) SubCutaneous every 8 hours  insulin lispro (HumaLOG) corrective regimen sliding scale   SubCutaneous three times a day before meals  lactated ringers. 1000 milliLiter(s) (100 mL/Hr) IV Continuous <Continuous>  lidocaine 2% Viscous 5 milliLiter(s) Swish and Spit three times a day  pantoprazole  Injectable 40 milliGRAM(s) IV Push daily    LABS: All Labs Reviewed:                        7.3    1.84  )-----------( 78       ( 27 Mar 2018 07:59 )             24.8     140  |  106  |  21  ----------------------------<  93  3.9   |  28  |  1.13    Ca    8.7      26 Mar 2018 07:51
CC:Patient is a 83y old  Female who presents with a chief complaint of Abdominal pain, nausea (25 Mar 2018 15:21)      Subjective:  Pt seen and examined at bedside with chaperone/pt's grandson who translated [place]ish. Pt is AAOx3, pt in no acute distress. Pt c.o diffuse mild abd pain. Pt denied c/o fever, chills, chest pain, SOB, N/V/D, extremity pain or dysfunction, hemoptysis, hematemesis, hematuria, hematochexia, headache, diplopia, vertigo, dizzyness. Pt states (+) void, (+) ambulation, (+) bowel function of flatus    ROS:  abd pain, otherwise negative ROS    Vital Signs Last 24 Hrs  T(C): 37.2 (26 Mar 2018 12:01), Max: 37.4 (25 Mar 2018 15:26)  T(F): 98.9 (26 Mar 2018 12:01), Max: 99.3 (25 Mar 2018 15:26)  HR: 73 (26 Mar 2018 12:01) (72 - 73)  BP: 136/47 (26 Mar 2018 12:01) (114/74 - 136/47)  BP(mean): --  RR: 17 (26 Mar 2018 12:01) (17 - 17)  SpO2: 100% (26 Mar 2018 12:01) (96% - 100%)    Labs:      CARDIAC MARKERS ( 25 Mar 2018 10:59 )  <0.015 ng/mL / x     / x     / x     / x      CARDIAC MARKERS ( 25 Mar 2018 07:36 )  <0.015 ng/mL / x     / 22 U/L / x     / x                                7.2    1.94  )-----------( 80       ( 26 Mar 2018 07:51 )             23.6     CBC Full  -  ( 26 Mar 2018 07:51 )  WBC Count : 1.94 K/uL  Hemoglobin : 7.2 g/dL  Hematocrit : 23.6 %  Platelet Count - Automated : 80 K/uL  Mean Cell Volume : 82.8 fl  Mean Cell Hemoglobin : 25.3 pg  Mean Cell Hemoglobin Concentration : 30.5 gm/dL  Auto Neutrophil # : 0.98 K/uL  Auto Lymphocyte # : 0.58 K/uL  Auto Monocyte # : 0.36 K/uL  Auto Eosinophil # : 0.01 K/uL  Auto Basophil # : 0.00 K/uL  Auto Neutrophil % : 50.5 %  Auto Lymphocyte % : 29.9 %  Auto Monocyte % : 18.6 %  Auto Eosinophil % : 0.5 %  Auto Basophil % : 0.0 %    03-26    140  |  106  |  21  ----------------------------<  93  3.9   |  28  |  1.13    Ca    8.7      26 Mar 2018 07:51    TPro  8.3  /  Alb  3.3  /  TBili  0.3  /  DBili  x   /  AST  12<L>  /  ALT  8<L>  /  AlkPhos  73  03-25    LIVER FUNCTIONS - ( 25 Mar 2018 07:36 )  Alb: 3.3 g/dL / Pro: 8.3 gm/dL / ALK PHOS: 73 U/L / ALT: 8 U/L / AST: 12 U/L / GGT: x           PT/INR - ( 25 Mar 2018 07:36 )   PT: 12.8 sec;   INR: 1.18 ratio         PTT - ( 25 Mar 2018 07:36 )  PTT:26.3 sec      Meds:  dextrose 5%. 1000 milliLiter(s) IV Continuous <Continuous>  dextrose 50% Injectable 12.5 Gram(s) IV Push once  dextrose 50% Injectable 25 Gram(s) IV Push once  dextrose 50% Injectable 25 Gram(s) IV Push once  dextrose Gel 1 Dose(s) Oral once PRN  glucagon  Injectable 1 milliGRAM(s) IntraMuscular once PRN  heparin  Injectable 5000 Unit(s) SubCutaneous every 8 hours  insulin lispro (HumaLOG) corrective regimen sliding scale   SubCutaneous three times a day before meals  lactated ringers. 1000 milliLiter(s) IV Continuous <Continuous>  morphine  - Injectable 2 milliGRAM(s) IV Push every 4 hours PRN  ondansetron Injectable 4 milliGRAM(s) IV Push every 6 hours PRN  pantoprazole  Injectable 40 milliGRAM(s) IV Push daily  phenol 1.4% (CHLORASEPTIC) Oral Spray 2 Spray(s) Topical four times a day PRN      Radiology:  pending small bowel series    Physical exam:  Pt is aaox3  Pt in no acute distress  Resp: CTAB  CVS: S1S2(+)  ABD: bowel sounds (+), soft, non distended, no rebound, no guarding, no rigidity, no skin changes to exam. (+) diffuse mild abd tenderness to deep palpation exam. NGT demonstrates appropriate bilious output  EXT: no calf tenderness or edema to exam b/l, on VTE prophylaxis  Skin: no adverse skin changes to exam
83 Y old female with multiple medical problems, presented with diffuse upper abdominal pain colicky , intermittent  mild radiation to back. Pain is associated with nausea and vomiting x3, no fever Last BM yesterday, not sure if passing gas. No SOB, as H/o ch cough, ch sore throat was being followed up by ENT was suppose to get a biopsy this week. No diarrhea mild dysuria. H/O Ch pancytopenia. N o sick contacts h/O similar symptoms almost a year ago but SBO was not conformed then.    3/27  Doing well, small bowel series negative obstruction, advancing diet.    ROS- negative other than above.    Vital Signs Last 24 Hrs  T(C): 37.1 (27 Mar 2018 10:50), Max: 37.6 (26 Mar 2018 17:34)  T(F): 98.8 (27 Mar 2018 10:50), Max: 99.7 (26 Mar 2018 17:34)  HR: 68 (27 Mar 2018 10:50) (68 - 86)  BP: 113/42 (27 Mar 2018 10:50) (113/42 - 147/51)  BP(mean): --  RR: 18 (27 Mar 2018 10:50) (17 - 18)  SpO2: 95% (27 Mar 2018 10:50) (95% - 99%)
LATE NOTE (PT SEEN APPROX 10:45 AM)    CHIEF COMPLAINT: abd pain     SUBJECTIVE: Seen this AM, tolerating diet.  No sob, lightheadedness, cp or any other complaints.  Offered prbc for hgb <7 however refused.  Pts grandson present at bedside already has arrangement for repeat CBC in 2-3 days.    REVIEW OF SYSTEMS:  All other review of systems is negative unless indicated above    Vital Signs Last 24 Hrs  T(C): 37.1 (28 Mar 2018 11:40), Max: 37.8 (28 Mar 2018 05:08)  T(F): 98.8 (28 Mar 2018 11:40), Max: 100.1 (28 Mar 2018 05:08)  HR: 66 (28 Mar 2018 11:40) (66 - 74)  BP: 130/47 (28 Mar 2018 11:40) (127/44 - 130/47)  BP(mean): --  RR: 16 (28 Mar 2018 11:40) (16 - 18)  SpO2: 95% (28 Mar 2018 11:40) (95% - 99%)    PHYSICAL EXAM:  Constitutional: NAD, awake and alert, well-developed  HEENT: PERR, EOMI, Normal Hearing, MMM.  Neck: Soft and supple, No LAD, No JVD  Respiratory: Breath sounds are clear bilaterally, No wheezing, rales or rhonchi  Cardiovascular: S1 and S2, regular rate and rhythm, no Murmurs, gallops or rubs  Gastrointestinal: Bowel Sounds present, soft, nontender, nondistended, no guarding, no rebound  Extremities: No peripheral edema  Vascular: 2+ peripheral pulses  Neurological: A/O x 3, no focal deficits  Musculoskeletal: 5/5 strength b/l upper and lower extremities  Skin: No rashes    MEDICATIONS:  MEDICATIONS  (STANDING):  bisacodyl 5 milliGRAM(s) Oral at bedtime  dextrose 5%. 1000 milliLiter(s) (50 mL/Hr) IV Continuous <Continuous>  dextrose 50% Injectable 12.5 Gram(s) IV Push once  dextrose 50% Injectable 25 Gram(s) IV Push once  dextrose 50% Injectable 25 Gram(s) IV Push once  heparin  Injectable 5000 Unit(s) SubCutaneous every 8 hours  insulin lispro (HumaLOG) corrective regimen sliding scale   SubCutaneous three times a day before meals  lidocaine 2% Viscous 5 milliLiter(s) Swish and Spit three times a day  pantoprazole  Injectable 40 milliGRAM(s) IV Push daily  polyethylene glycol 3350 17 Gram(s) Oral daily    LABS: All Labs Reviewed:                        6.5    x     )-----------( x        ( 28 Mar 2018 10:58 )             21.0     140  |  104  |  13  ----------------------------<  97  3.6   |  28  |  0.94    Ca    8.6      28 Mar 2018 07:15
Patient is a 83y old  Female who presents with a chief complaint of Abdominal pain, nausea (25 Mar 2018 15:21)      HPI:  83 Y old female with multiple medical problems, presented with diffuse upper abdominal pain colicky , intermittent  mild radiation to back. Pain is associated with nausea and vomiting x3, no fever Last BM yesterday, not sure if passing gas. No SOB, as H/o ch cough, ch sore throat was being followed up by ENT was suppose to get a biopsy this week. No diarrhea mild dysuria. H/O Ch pancytopenia. N o sick contacts h/O similar symptoms almost a year ago but SBO was not conformed then. (25 Mar 2018 15:21)  3/28:  Pt seen and examined, no abdominal pain, tolerating diet, no nausea no vomiting, had a BM. Cough a base line. lowgrade fever dropped HCT to 21 this morning ,no sign of acute bleeding has H/O ch anemia, pancytopenia no hematemesis ,no melena r blood in the stool.  ROS:.  [X] A ten-point review of systems was otherwise negative except as noted.  Systemic:	[ ] Fever	[ ] Chills	[ ] Night sweats    [ ] Fatigue	[ ] Other  [] Cardiovascular:  [] Pulmonary: Ch cough   [] Renal/Urologic:  [] Gastrointestinal: abdominal pain, vomiting  [] Metabolic:  [] Neurologic:  [] Hematologic:  [] ENT:  [] Ophthalmologic:  [] Musculoskeletal:    [ ] Due to altered mental status/intubation, subjective information were not able to be obtained from the patient. History was obtained, to the extent possible, from review of the chart and collateral sources of information.    All other system review is negative .  PAST MEDICAL & SURGICAL HISTORY:  CKD (chronic kidney disease) stage 3, GFR 30-59 ml/min  Anemia, unspecified type  GERD (gastroesophageal reflux disease)  High cholesterol  Diabetes  HTN (hypertension)  H/O: hysterectomy  S/P cholecystectomy  S/P appendectomy    FAMILY HISTORY:  No pertinent family history in first degree relatives    Social HX:   Alcohol: Denied  Smoking: Denied  Drug Use: Denied        Vital Signs Last 24 Hrs  T(C): 37.1 (28 Mar 2018 11:40), Max: 37.8 (28 Mar 2018 05:08)  T(F): 98.8 (28 Mar 2018 11:40), Max: 100.1 (28 Mar 2018 05:08)  HR: 66 (28 Mar 2018 11:40) (66 - 74)  BP: 130/47 (28 Mar 2018 11:40) (123/41 - 130/47)  BP(mean): --  RR: 16 (28 Mar 2018 11:40) (16 - 18)  SpO2: 95% (28 Mar 2018 11:40) (95% - 99%)    I&O's Summary    27 Mar 2018 07:01  -  28 Mar 2018 07:00  --------------------------------------------------------  IN: 980 mL / OUT: 0 mL / NET: 980 mL        PHYSICAL EXAM:  Constitutional: NAD  Eyes:  WNL  ENMT:  WNL  Neck:  WNL, non tender  Back: Non tender  Respiratory: CTABL, transmitted sounds  Cardiovascular:  S1+S2+0  Gastrointestinal: Soft, ND , NT  Genitourinary:  WNL  Extremities: NV intact  Vascular:  Intact  Neurological: No focal neurological deficit,  CN, motor and sensory system grossly intact.  Skin: WNL  Musculoskeletal: WNL  Psychiatric: Grossly WNL        Labs:                          6.5    x     )-----------( x        ( 28 Mar 2018 10:58 )             21.0       03-28    140  |  104  |  13  ----------------------------<  97  3.6   |  28  |  0.94    Ca    8.6      28 Mar 2018 07:15    Hematocrit: 21.0 % (03.28.18 @ 10:58)
Subjective:    pat better, s/p removal of NG tube, no respiratory complaint.    Home Medications:  benzonatate 200 mg oral capsule: 1 cap(s) orally 3 times a day, As Needed (23 Jun 2017 18:26)  calcium-vitamin D 600 mg-500 intl units oral tablet, extended release: 2 tab(s) orally once a day (in the morning) (23 Jun 2017 18:27)  fluticasone 0.5 mg/2 mL inhalation suspension:  inhaled  (23 Jun 2017 18:26)  homatropine-hydrocodone 1.5 mg-5 mg/5 mL oral syrup: 5 milliliter(s) orally every 6 hours, As needed, worse Cough (30 Jun 2017 15:39)  ipratropium 21 mcg/inh (0.03%) nasal spray: 2 spray(s) nasal 3 times a day (23 Jun 2017 18:28)  metFORMIN 500 mg oral tablet: 1 tab(s) orally 2 times a day (23 Jun 2017 15:07)  omeprazole 40 mg oral delayed release capsule: 1 cap(s) orally once a day (23 Jun 2017 18:27)    MEDICATIONS  (STANDING):  dextrose 5%. 1000 milliLiter(s) (50 mL/Hr) IV Continuous <Continuous>  dextrose 50% Injectable 12.5 Gram(s) IV Push once  dextrose 50% Injectable 25 Gram(s) IV Push once  dextrose 50% Injectable 25 Gram(s) IV Push once  heparin  Injectable 5000 Unit(s) SubCutaneous every 8 hours  insulin lispro (HumaLOG) corrective regimen sliding scale   SubCutaneous three times a day before meals  lactated ringers. 1000 milliLiter(s) (100 mL/Hr) IV Continuous <Continuous>  lidocaine 2% Viscous 5 milliLiter(s) Swish and Spit three times a day  pantoprazole  Injectable 40 milliGRAM(s) IV Push daily    MEDICATIONS  (PRN):  dextrose Gel 1 Dose(s) Oral once PRN Blood Glucose LESS THAN 70 milliGRAM(s)/deciliter  glucagon  Injectable 1 milliGRAM(s) IntraMuscular once PRN Glucose LESS THAN 70 milligrams/deciliter  morphine  - Injectable 2 milliGRAM(s) IV Push every 4 hours PRN Moderate Pain (4 - 6)  ondansetron Injectable 4 milliGRAM(s) IV Push every 6 hours PRN Nausea  phenol 1.4% (CHLORASEPTIC) Oral Spray 2 Spray(s) Topical four times a day PRN sore throat      Allergies    penicillin (Rash)    Intolerances        Vital Signs Last 24 Hrs  T(C): 37.4 (27 Mar 2018 06:07), Max: 37.6 (26 Mar 2018 17:34)  T(F): 99.4 (27 Mar 2018 06:07), Max: 99.7 (26 Mar 2018 17:34)  HR: 74 (27 Mar 2018 06:07) (73 - 86)  BP: 128/54 (27 Mar 2018 06:07) (128/54 - 147/51)  BP(mean): --  RR: 18 (27 Mar 2018 06:07) (17 - 18)  SpO2: 95% (27 Mar 2018 06:07) (95% - 100%)      PHYSICAL EXAMINATION:    NECK:  Supple. No lymphadenopathy. Jugular venous pressure not elevated. Carotids equal.   HEART:   The cardiac impulse has a normal quality. Reg., Nl S1 and S2.  There are no murmurs, rubs or gallops noted  CHEST:  Chest is clear to auscultation. Normal respiratory effort.  ABDOMEN:  Soft and nontender.   EXTREMITIES:  There is no edema.       LABS:                        7.3    1.84  )-----------( 78       ( 27 Mar 2018 07:59 )             24.8     03-26    140  |  106  |  21  ----------------------------<  93  3.9   |  28  |  1.13    Ca    8.7      26 Mar 2018 07:51
Subjective:    pat taking po & tolerating, loose phlegm.    Home Medications:  benzonatate 200 mg oral capsule: 1 cap(s) orally 3 times a day, As Needed (23 Jun 2017 18:26)  calcium-vitamin D 600 mg-500 intl units oral tablet, extended release: 2 tab(s) orally once a day (in the morning) (23 Jun 2017 18:27)  fluticasone 0.5 mg/2 mL inhalation suspension:  inhaled  (23 Jun 2017 18:26)  homatropine-hydrocodone 1.5 mg-5 mg/5 mL oral syrup: 5 milliliter(s) orally every 6 hours, As needed, worse Cough (30 Jun 2017 15:39)  ipratropium 21 mcg/inh (0.03%) nasal spray: 2 spray(s) nasal 3 times a day (23 Jun 2017 18:28)  metFORMIN 500 mg oral tablet: 1 tab(s) orally 2 times a day (23 Jun 2017 15:07)  omeprazole 40 mg oral delayed release capsule: 1 cap(s) orally once a day (23 Jun 2017 18:27)    MEDICATIONS  (STANDING):  dextrose 5%. 1000 milliLiter(s) (50 mL/Hr) IV Continuous <Continuous>  dextrose 50% Injectable 12.5 Gram(s) IV Push once  dextrose 50% Injectable 25 Gram(s) IV Push once  dextrose 50% Injectable 25 Gram(s) IV Push once  heparin  Injectable 5000 Unit(s) SubCutaneous every 8 hours  insulin lispro (HumaLOG) corrective regimen sliding scale   SubCutaneous three times a day before meals  lactated ringers. 1000 milliLiter(s) (100 mL/Hr) IV Continuous <Continuous>  lidocaine 2% Viscous 5 milliLiter(s) Swish and Spit three times a day  pantoprazole  Injectable 40 milliGRAM(s) IV Push daily    MEDICATIONS  (PRN):  dextrose Gel 1 Dose(s) Oral once PRN Blood Glucose LESS THAN 70 milliGRAM(s)/deciliter  glucagon  Injectable 1 milliGRAM(s) IntraMuscular once PRN Glucose LESS THAN 70 milligrams/deciliter  guaiFENesin    Syrup 200 milliGRAM(s) Oral every 6 hours PRN Cough  morphine  - Injectable 2 milliGRAM(s) IV Push every 4 hours PRN Moderate Pain (4 - 6)  ondansetron Injectable 4 milliGRAM(s) IV Push every 6 hours PRN Nausea  phenol 1.4% (CHLORASEPTIC) Oral Spray 2 Spray(s) Topical four times a day PRN sore throat      Allergies    penicillin (Rash)    Intolerances        Vital Signs Last 24 Hrs  T(C): 37.8 (28 Mar 2018 05:08), Max: 37.8 (28 Mar 2018 05:08)  T(F): 100.1 (28 Mar 2018 05:08), Max: 100.1 (28 Mar 2018 05:08)  HR: 74 (28 Mar 2018 05:08) (68 - 74)  BP: 127/44 (28 Mar 2018 05:08) (113/42 - 127/44)  BP(mean): --  RR: 18 (28 Mar 2018 05:08) (18 - 18)  SpO2: 99% (28 Mar 2018 05:08) (95% - 99%)      PHYSICAL EXAMINATION:    NECK:  Supple. No lymphadenopathy. Jugular venous pressure not elevated. Carotids equal.   HEART:   The cardiac impulse has a normal quality. Reg., Nl S1 and S2.  There are no murmurs, rubs or gallops noted  CHEST:  Chest crackles to auscultation. Normal respiratory effort.  ABDOMEN:  Soft and nontender.   EXTREMITIES:  There is no edema.       LABS:                        6.2    1.48  )-----------( 68       ( 28 Mar 2018 07:15 )             20.4     03-28    140  |  104  |  13  ----------------------------<  97  3.6   |  28  |  0.94    Ca    8.6      28 Mar 2018 07:15

## 2018-03-29 DIAGNOSIS — K56.609 UNSPECIFIED INTESTINAL OBSTRUCTION, UNSPECIFIED AS TO PARTIAL VERSUS COMPLETE OBSTRUCTION: ICD-10-CM

## 2018-03-29 DIAGNOSIS — D61.818 OTHER PANCYTOPENIA: ICD-10-CM

## 2018-03-29 DIAGNOSIS — K21.9 GASTRO-ESOPHAGEAL REFLUX DISEASE WITHOUT ESOPHAGITIS: ICD-10-CM

## 2018-03-29 DIAGNOSIS — E11.22 TYPE 2 DIABETES MELLITUS WITH DIABETIC CHRONIC KIDNEY DISEASE: ICD-10-CM

## 2018-03-29 DIAGNOSIS — R07.0 PAIN IN THROAT: ICD-10-CM

## 2018-03-29 DIAGNOSIS — N18.3 CHRONIC KIDNEY DISEASE, STAGE 3 (MODERATE): ICD-10-CM

## 2018-03-29 DIAGNOSIS — E78.5 HYPERLIPIDEMIA, UNSPECIFIED: ICD-10-CM

## 2018-03-29 DIAGNOSIS — I12.9 HYPERTENSIVE CHRONIC KIDNEY DISEASE WITH STAGE 1 THROUGH STAGE 4 CHRONIC KIDNEY DISEASE, OR UNSPECIFIED CHRONIC KIDNEY DISEASE: ICD-10-CM

## 2018-05-10 ENCOUNTER — EMERGENCY (EMERGENCY)
Facility: HOSPITAL | Age: 83
LOS: 0 days | Discharge: HOME | End: 2018-05-10
Attending: EMERGENCY MEDICINE | Admitting: EMERGENCY MEDICINE
Payer: MEDICARE

## 2018-05-10 VITALS
TEMPERATURE: 98 F | OXYGEN SATURATION: 98 % | HEART RATE: 75 BPM | DIASTOLIC BLOOD PRESSURE: 69 MMHG | WEIGHT: 149.91 LBS | SYSTOLIC BLOOD PRESSURE: 142 MMHG | RESPIRATION RATE: 14 BRPM | HEIGHT: 66 IN

## 2018-05-10 VITALS
HEART RATE: 65 BPM | SYSTOLIC BLOOD PRESSURE: 132 MMHG | OXYGEN SATURATION: 98 % | TEMPERATURE: 99 F | DIASTOLIC BLOOD PRESSURE: 56 MMHG | RESPIRATION RATE: 16 BRPM

## 2018-05-10 DIAGNOSIS — J35.8 OTHER CHRONIC DISEASES OF TONSILS AND ADENOIDS: ICD-10-CM

## 2018-05-10 DIAGNOSIS — C85.90 NON-HODGKIN LYMPHOMA, UNSPECIFIED, UNSPECIFIED SITE: ICD-10-CM

## 2018-05-10 DIAGNOSIS — D64.9 ANEMIA, UNSPECIFIED: ICD-10-CM

## 2018-05-10 DIAGNOSIS — N18.3 CHRONIC KIDNEY DISEASE, STAGE 3 (MODERATE): ICD-10-CM

## 2018-05-10 DIAGNOSIS — Z90.710 ACQUIRED ABSENCE OF BOTH CERVIX AND UTERUS: Chronic | ICD-10-CM

## 2018-05-10 DIAGNOSIS — E11.22 TYPE 2 DIABETES MELLITUS WITH DIABETIC CHRONIC KIDNEY DISEASE: ICD-10-CM

## 2018-05-10 DIAGNOSIS — Z90.49 ACQUIRED ABSENCE OF OTHER SPECIFIED PARTS OF DIGESTIVE TRACT: Chronic | ICD-10-CM

## 2018-05-10 DIAGNOSIS — I12.9 HYPERTENSIVE CHRONIC KIDNEY DISEASE WITH STAGE 1 THROUGH STAGE 4 CHRONIC KIDNEY DISEASE, OR UNSPECIFIED CHRONIC KIDNEY DISEASE: ICD-10-CM

## 2018-05-10 DIAGNOSIS — Z79.84 LONG TERM (CURRENT) USE OF ORAL HYPOGLYCEMIC DRUGS: ICD-10-CM

## 2018-05-10 LAB
ALBUMIN SERPL ELPH-MCNC: 3.3 G/DL — SIGNIFICANT CHANGE UP (ref 3.3–5)
ALP SERPL-CCNC: 63 U/L — SIGNIFICANT CHANGE UP (ref 40–120)
ALT FLD-CCNC: 11 U/L — LOW (ref 12–78)
ANION GAP SERPL CALC-SCNC: 12 MMOL/L — SIGNIFICANT CHANGE UP (ref 5–17)
ANISOCYTOSIS BLD QL: SLIGHT — SIGNIFICANT CHANGE UP
APTT BLD: 18.7 SEC — LOW (ref 27.5–37.4)
AST SERPL-CCNC: 19 U/L — SIGNIFICANT CHANGE UP (ref 15–37)
BASOPHILS # BLD AUTO: 0 K/UL — SIGNIFICANT CHANGE UP (ref 0–0.2)
BASOPHILS NFR BLD AUTO: 0 % — SIGNIFICANT CHANGE UP (ref 0–2)
BILIRUB SERPL-MCNC: 0.3 MG/DL — SIGNIFICANT CHANGE UP (ref 0.2–1.2)
BUN SERPL-MCNC: 44 MG/DL — HIGH (ref 7–23)
CALCIUM SERPL-MCNC: 9.4 MG/DL — SIGNIFICANT CHANGE UP (ref 8.5–10.1)
CHLORIDE SERPL-SCNC: 103 MMOL/L — SIGNIFICANT CHANGE UP (ref 96–108)
CO2 SERPL-SCNC: 22 MMOL/L — SIGNIFICANT CHANGE UP (ref 22–31)
CREAT SERPL-MCNC: 1.57 MG/DL — HIGH (ref 0.5–1.3)
ELLIPTOCYTES BLD QL SMEAR: SLIGHT — SIGNIFICANT CHANGE UP
EOSINOPHIL # BLD AUTO: 0 K/UL — SIGNIFICANT CHANGE UP (ref 0–0.5)
EOSINOPHIL NFR BLD AUTO: 0 % — SIGNIFICANT CHANGE UP (ref 0–6)
GLUCOSE SERPL-MCNC: 146 MG/DL — HIGH (ref 70–99)
HCT VFR BLD CALC: 28.6 % — LOW (ref 34.5–45)
HGB BLD-MCNC: 8.9 G/DL — LOW (ref 11.5–15.5)
HYPOCHROMIA BLD QL: SLIGHT — SIGNIFICANT CHANGE UP
INR BLD: 1.03 RATIO — SIGNIFICANT CHANGE UP (ref 0.88–1.16)
LYMPHOCYTES # BLD AUTO: 0.26 K/UL — LOW (ref 1–3.3)
LYMPHOCYTES # BLD AUTO: 1 % — LOW (ref 13–44)
MANUAL SMEAR VERIFICATION: SIGNIFICANT CHANGE UP
MCHC RBC-ENTMCNC: 25.4 PG — LOW (ref 27–34)
MCHC RBC-ENTMCNC: 31.1 GM/DL — LOW (ref 32–36)
MCV RBC AUTO: 81.5 FL — SIGNIFICANT CHANGE UP (ref 80–100)
METAMYELOCYTES # FLD: 2 % — HIGH (ref 0–0)
MICROCYTES BLD QL: SLIGHT — SIGNIFICANT CHANGE UP
MONOCYTES # BLD AUTO: 0.26 K/UL — SIGNIFICANT CHANGE UP (ref 0–0.9)
MONOCYTES NFR BLD AUTO: 1 % — LOW (ref 2–14)
NEUTROPHILS # BLD AUTO: 24.6 K/UL — HIGH (ref 1.8–7.4)
NEUTROPHILS NFR BLD AUTO: 88 % — HIGH (ref 43–77)
NEUTS BAND # BLD: 8 % — SIGNIFICANT CHANGE UP (ref 0–8)
NRBC # BLD: 0 /100 — SIGNIFICANT CHANGE UP (ref 0–0)
NRBC # BLD: SIGNIFICANT CHANGE UP /100 WBCS (ref 0–0)
PLAT MORPH BLD: NORMAL — SIGNIFICANT CHANGE UP
PLATELET # BLD AUTO: 99 K/UL — LOW (ref 150–400)
POIKILOCYTOSIS BLD QL AUTO: SLIGHT — SIGNIFICANT CHANGE UP
POTASSIUM SERPL-MCNC: 4.8 MMOL/L — SIGNIFICANT CHANGE UP (ref 3.5–5.3)
POTASSIUM SERPL-SCNC: 4.8 MMOL/L — SIGNIFICANT CHANGE UP (ref 3.5–5.3)
PROT SERPL-MCNC: 8 GM/DL — SIGNIFICANT CHANGE UP (ref 6–8.3)
PROTHROM AB SERPL-ACNC: 11.1 SEC — SIGNIFICANT CHANGE UP (ref 9.8–12.7)
RBC # BLD: 3.51 M/UL — LOW (ref 3.8–5.2)
RBC # FLD: 17.1 % — HIGH (ref 10.3–14.5)
RBC BLD AUTO: (no result)
SODIUM SERPL-SCNC: 137 MMOL/L — SIGNIFICANT CHANGE UP (ref 135–145)
WBC # BLD: 25.63 K/UL — HIGH (ref 3.8–10.5)
WBC # FLD AUTO: 25.63 K/UL — HIGH (ref 3.8–10.5)

## 2018-05-10 PROCEDURE — 99284 EMERGENCY DEPT VISIT MOD MDM: CPT

## 2018-05-10 PROCEDURE — 93010 ELECTROCARDIOGRAM REPORT: CPT

## 2018-05-10 NOTE — ED ADULT NURSE NOTE - OBJECTIVE STATEMENT
Pt with recent diagnosis of lymphoma in tonsil c/o 1 episode of spitting blood with some clots.  Denies cough, denies vomiting.  No bleeding at this time.  Pt with chronic throat pain, no worse than baseline at this time.

## 2018-05-10 NOTE — ED PROVIDER NOTE - OBJECTIVE STATEMENT
82 y/o F w/ pmhx non-hodgkin's lymphoma of left tonsil, anemia, DM, pshx of appendectomy, hysterectomy, presents to ED c/o spit up bloody sputum. Pt was preparing lunch and felt something in her throat, spit it out and saw blood. Son reports significant amount of blood with clots. No cough or vomiting. Currently on chemo, prednisone, Claritin and Neupogen and Fentanyl patch. No CP, SOB. First chemo 2 days ago, 2nd tx today. Allergic to penicillin.  Oncologist Dr. Avila. Biopsy done by Dr. ferreira in [place]. PMD Dr. Mckeon.

## 2018-05-10 NOTE — ED PROVIDER NOTE - ENMT, MLM
Airway patent, Nasal mucosa clear. Mouth with normal mucosa. visible left tonsillar mass, no evidence of active bleeding.

## 2018-05-10 NOTE — ED PROVIDER NOTE - PROGRESS NOTE DETAILS
Benton Engle: spoke with Rigo, recommends seeing pt in office today. Discussed plan with family and family agree to f/u with Dr. Sierra directly after discharge.

## 2018-05-15 ENCOUNTER — OTHER (OUTPATIENT)
Age: 83
End: 2018-05-15

## 2018-05-15 ENCOUNTER — OUTPATIENT (OUTPATIENT)
Dept: OUTPATIENT SERVICES | Facility: HOSPITAL | Age: 83
LOS: 1 days | Discharge: HOME | End: 2018-05-15

## 2018-05-15 DIAGNOSIS — Z90.49 ACQUIRED ABSENCE OF OTHER SPECIFIED PARTS OF DIGESTIVE TRACT: Chronic | ICD-10-CM

## 2018-05-15 DIAGNOSIS — Z90.710 ACQUIRED ABSENCE OF BOTH CERVIX AND UTERUS: Chronic | ICD-10-CM

## 2018-05-15 DIAGNOSIS — C85.12 UNSPECIFIED B-CELL LYMPHOMA, INTRATHORACIC LYMPH NODES: ICD-10-CM

## 2018-05-16 ENCOUNTER — APPOINTMENT (OUTPATIENT)
Dept: HEMATOLOGY ONCOLOGY | Facility: CLINIC | Age: 83
End: 2018-05-16
Payer: MEDICARE

## 2018-05-16 ENCOUNTER — RESULT REVIEW (OUTPATIENT)
Age: 83
End: 2018-05-16

## 2018-05-16 VITALS
DIASTOLIC BLOOD PRESSURE: 72 MMHG | TEMPERATURE: 98.5 F | OXYGEN SATURATION: 97 % | BODY MASS INDEX: 22.85 KG/M2 | SYSTOLIC BLOOD PRESSURE: 119 MMHG | HEART RATE: 83 BPM | WEIGHT: 133.82 LBS | HEIGHT: 64 IN

## 2018-05-16 DIAGNOSIS — C83.30 DIFFUSE LARGE B-CELL LYMPHOMA, UNSPECIFIED SITE: ICD-10-CM

## 2018-05-16 LAB
ALBUMIN SERPL ELPH-MCNC: 3.5 G/DL
ALP BLD-CCNC: 63 U/L
ALT SERPL-CCNC: 16 U/L
ANION GAP SERPL CALC-SCNC: 12 MMOL/L
ANISOCYTOSIS BLD QL: SLIGHT — SIGNIFICANT CHANGE UP
AST SERPL-CCNC: 14 U/L
BASOPHILS # BLD AUTO: 0 K/UL — SIGNIFICANT CHANGE UP (ref 0–0.2)
BILIRUB SERPL-MCNC: 0.2 MG/DL
BUN SERPL-MCNC: 28 MG/DL
CALCIUM SERPL-MCNC: 9.3 MG/DL
CHLORIDE SERPL-SCNC: 97 MMOL/L
CO2 SERPL-SCNC: 31 MMOL/L
CREAT SERPL-MCNC: 1.03 MG/DL
DACRYOCYTES BLD QL SMEAR: SLIGHT — SIGNIFICANT CHANGE UP
EOSINOPHIL # BLD AUTO: 0 K/UL — SIGNIFICANT CHANGE UP (ref 0–0.5)
EOSINOPHIL NFR BLD AUTO: 3 % — SIGNIFICANT CHANGE UP (ref 0–6)
GLUCOSE SERPL-MCNC: 102 MG/DL
HCT VFR BLD CALC: 28.1 % — LOW (ref 34.5–45)
HGB BLD-MCNC: 9.2 G/DL — LOW (ref 11.5–15.5)
LDH SERPL-CCNC: 141 U/L
LG PLATELETS BLD QL AUTO: SLIGHT — SIGNIFICANT CHANGE UP
LYMPHOCYTES # BLD AUTO: 0.6 K/UL — LOW (ref 1–3.3)
LYMPHOCYTES # BLD AUTO: 59 % — HIGH (ref 13–44)
MCHC RBC-ENTMCNC: 25.5 PG — LOW (ref 27–34)
MCHC RBC-ENTMCNC: 32.7 GM/DL — SIGNIFICANT CHANGE UP (ref 32–36)
MCV RBC AUTO: 78 FL — LOW (ref 80–100)
MICROCYTES BLD QL: SLIGHT — SIGNIFICANT CHANGE UP
MONOCYTES # BLD AUTO: 0.3 K/UL — SIGNIFICANT CHANGE UP (ref 0–0.9)
MONOCYTES NFR BLD AUTO: 13 % — SIGNIFICANT CHANGE UP (ref 2–14)
NEUTROPHILS # BLD AUTO: 0.4 K/UL — LOW (ref 1.8–7.4)
NEUTROPHILS NFR BLD AUTO: 24 % — LOW (ref 43–77)
NEUTS BAND # BLD: 1 % — SIGNIFICANT CHANGE UP (ref 0–8)
PLAT MORPH BLD: NORMAL — SIGNIFICANT CHANGE UP
PLATELET # BLD AUTO: 90 K/UL — LOW (ref 150–400)
POIKILOCYTOSIS BLD QL AUTO: SLIGHT — SIGNIFICANT CHANGE UP
POTASSIUM SERPL-SCNC: 4.2 MMOL/L
PROT SERPL-MCNC: 6.6 G/DL
RBC # BLD: 3.6 M/UL — LOW (ref 3.8–5.2)
RBC # FLD: 15.8 % — HIGH (ref 10.3–14.5)
RBC BLD AUTO: SIGNIFICANT CHANGE UP
SODIUM SERPL-SCNC: 140 MMOL/L
WBC # BLD: 1.3 K/UL — LOW (ref 3.8–10.5)
WBC # FLD AUTO: 1.3 K/UL — LOW (ref 3.8–10.5)

## 2018-05-16 PROCEDURE — 99204 OFFICE O/P NEW MOD 45 MIN: CPT

## 2018-05-18 PROBLEM — C83.30 DIFFUSE LARGE B CELL LYMPHOMA: Status: ACTIVE | Noted: 2018-05-16

## 2018-05-23 ENCOUNTER — RESULT REVIEW (OUTPATIENT)
Age: 83
End: 2018-05-23

## 2018-06-04 ENCOUNTER — EMERGENCY (EMERGENCY)
Facility: HOSPITAL | Age: 83
LOS: 0 days | Discharge: HOME | End: 2018-06-04
Attending: EMERGENCY MEDICINE | Admitting: EMERGENCY MEDICINE
Payer: MEDICARE

## 2018-06-04 VITALS
HEART RATE: 73 BPM | WEIGHT: 130.07 LBS | HEIGHT: 64 IN | TEMPERATURE: 99 F | OXYGEN SATURATION: 100 % | SYSTOLIC BLOOD PRESSURE: 155 MMHG | RESPIRATION RATE: 16 BRPM | DIASTOLIC BLOOD PRESSURE: 58 MMHG

## 2018-06-04 DIAGNOSIS — R10.9 UNSPECIFIED ABDOMINAL PAIN: ICD-10-CM

## 2018-06-04 DIAGNOSIS — N18.3 CHRONIC KIDNEY DISEASE, STAGE 3 (MODERATE): ICD-10-CM

## 2018-06-04 DIAGNOSIS — Z90.49 ACQUIRED ABSENCE OF OTHER SPECIFIED PARTS OF DIGESTIVE TRACT: Chronic | ICD-10-CM

## 2018-06-04 DIAGNOSIS — Z92.21 PERSONAL HISTORY OF ANTINEOPLASTIC CHEMOTHERAPY: ICD-10-CM

## 2018-06-04 DIAGNOSIS — Z90.710 ACQUIRED ABSENCE OF BOTH CERVIX AND UTERUS: Chronic | ICD-10-CM

## 2018-06-04 DIAGNOSIS — Z79.899 OTHER LONG TERM (CURRENT) DRUG THERAPY: ICD-10-CM

## 2018-06-04 DIAGNOSIS — D64.9 ANEMIA, UNSPECIFIED: ICD-10-CM

## 2018-06-04 DIAGNOSIS — E11.9 TYPE 2 DIABETES MELLITUS WITHOUT COMPLICATIONS: ICD-10-CM

## 2018-06-04 DIAGNOSIS — Z90.49 ACQUIRED ABSENCE OF OTHER SPECIFIED PARTS OF DIGESTIVE TRACT: ICD-10-CM

## 2018-06-04 DIAGNOSIS — E78.5 HYPERLIPIDEMIA, UNSPECIFIED: ICD-10-CM

## 2018-06-04 DIAGNOSIS — Z79.82 LONG TERM (CURRENT) USE OF ASPIRIN: ICD-10-CM

## 2018-06-04 DIAGNOSIS — Z85.72 PERSONAL HISTORY OF NON-HODGKIN LYMPHOMAS: ICD-10-CM

## 2018-06-04 DIAGNOSIS — I12.9 HYPERTENSIVE CHRONIC KIDNEY DISEASE WITH STAGE 1 THROUGH STAGE 4 CHRONIC KIDNEY DISEASE, OR UNSPECIFIED CHRONIC KIDNEY DISEASE: ICD-10-CM

## 2018-06-04 DIAGNOSIS — Z79.84 LONG TERM (CURRENT) USE OF ORAL HYPOGLYCEMIC DRUGS: ICD-10-CM

## 2018-06-04 DIAGNOSIS — R11.2 NAUSEA WITH VOMITING, UNSPECIFIED: ICD-10-CM

## 2018-06-04 DIAGNOSIS — Z90.710 ACQUIRED ABSENCE OF BOTH CERVIX AND UTERUS: ICD-10-CM

## 2018-06-04 PROCEDURE — 99284 EMERGENCY DEPT VISIT MOD MDM: CPT

## 2018-06-04 NOTE — ED PROVIDER NOTE - CONSTITUTIONAL, MLM
normal... chronically ill appearing,  awake, alert, oriented to person, place, time/situation and in no apparent distress.

## 2018-06-04 NOTE — ED PROVIDER NOTE - MEDICAL DECISION MAKING DETAILS
low suspicion for SBO, discussed with family and patient at beside risks and benefits of CT abd/pelvis now VS d/c home and returning if Sx worsen. Patient and family both agree to be d/c home and will return if Sx occur. low suspicion for SBO, discussed with family and patient at beside risks and benefits of CT abd/pelvis now VS d/c home and returning if Sx worsen. Patient and family both agree to be d/c home and will return if Sx occur. advised soft/liquid diet for 24 hours, advance as tolerated.

## 2018-06-04 NOTE — ED ADULT TRIAGE NOTE - CHIEF COMPLAINT QUOTE
Pt. to the ED BIBA and Son C/O Abdominal pain that started today with x 1 vomiting - Hx. of Lymphoma on chemo and SOB - Pt. denies CP and SOB-

## 2018-06-04 NOTE — ED PROVIDER NOTE - OBJECTIVE STATEMENT
82 y/o F with PMHx of  Lymphoma - 2 cycles of chemo, after 1 chemo mass went away, last chemo was on Wednesday, s/p hysterectomy, s/p appendectomy, s/p cholecystomy presents to the ED c/o x1 vomiting and abd pain. Currently no pain at this time. This morning patient had banana, cereal and milk with no complaints. Pt had half a bagel and egg is when pain worsened around 1pm. At 5pm called son and was in worsening pain. Pt vomited around 5pm. Since vomiting abd pain relieved.  Pt notes pain today was worse than prior bowel obstruction. Patient did have normal bowel movement today. Pt feels fine at this time. Pt notes occasional joint pain. Denies diarrhea. Appointment with Oncologist tomorrow.

## 2018-06-19 ENCOUNTER — EMERGENCY (EMERGENCY)
Facility: HOSPITAL | Age: 83
LOS: 0 days | Discharge: HOME | End: 2018-06-19
Attending: EMERGENCY MEDICINE | Admitting: EMERGENCY MEDICINE
Payer: MEDICARE

## 2018-06-19 VITALS
RESPIRATION RATE: 19 BRPM | HEART RATE: 75 BPM | SYSTOLIC BLOOD PRESSURE: 140 MMHG | TEMPERATURE: 99 F | DIASTOLIC BLOOD PRESSURE: 60 MMHG | OXYGEN SATURATION: 100 %

## 2018-06-19 VITALS
RESPIRATION RATE: 16 BRPM | SYSTOLIC BLOOD PRESSURE: 165 MMHG | WEIGHT: 139.99 LBS | HEIGHT: 63 IN | OXYGEN SATURATION: 98 % | DIASTOLIC BLOOD PRESSURE: 73 MMHG | HEART RATE: 81 BPM

## 2018-06-19 DIAGNOSIS — Z79.84 LONG TERM (CURRENT) USE OF ORAL HYPOGLYCEMIC DRUGS: ICD-10-CM

## 2018-06-19 DIAGNOSIS — R11.2 NAUSEA WITH VOMITING, UNSPECIFIED: ICD-10-CM

## 2018-06-19 DIAGNOSIS — Z88.0 ALLERGY STATUS TO PENICILLIN: ICD-10-CM

## 2018-06-19 DIAGNOSIS — Z90.49 ACQUIRED ABSENCE OF OTHER SPECIFIED PARTS OF DIGESTIVE TRACT: Chronic | ICD-10-CM

## 2018-06-19 DIAGNOSIS — Z90.710 ACQUIRED ABSENCE OF BOTH CERVIX AND UTERUS: Chronic | ICD-10-CM

## 2018-06-19 DIAGNOSIS — K21.9 GASTRO-ESOPHAGEAL REFLUX DISEASE WITHOUT ESOPHAGITIS: ICD-10-CM

## 2018-06-19 DIAGNOSIS — Z79.899 OTHER LONG TERM (CURRENT) DRUG THERAPY: ICD-10-CM

## 2018-06-19 DIAGNOSIS — D64.9 ANEMIA, UNSPECIFIED: ICD-10-CM

## 2018-06-19 DIAGNOSIS — E78.00 PURE HYPERCHOLESTEROLEMIA, UNSPECIFIED: ICD-10-CM

## 2018-06-19 DIAGNOSIS — I10 ESSENTIAL (PRIMARY) HYPERTENSION: ICD-10-CM

## 2018-06-19 DIAGNOSIS — R10.13 EPIGASTRIC PAIN: ICD-10-CM

## 2018-06-19 DIAGNOSIS — E11.9 TYPE 2 DIABETES MELLITUS WITHOUT COMPLICATIONS: ICD-10-CM

## 2018-06-19 LAB
ALBUMIN SERPL ELPH-MCNC: 3.5 G/DL — SIGNIFICANT CHANGE UP (ref 3.3–5)
ALP SERPL-CCNC: 80 U/L — SIGNIFICANT CHANGE UP (ref 40–120)
ALT FLD-CCNC: 12 U/L — SIGNIFICANT CHANGE UP (ref 12–78)
ANION GAP SERPL CALC-SCNC: 8 MMOL/L — SIGNIFICANT CHANGE UP (ref 5–17)
ANISOCYTOSIS BLD QL: SLIGHT — SIGNIFICANT CHANGE UP
APTT BLD: 28 SEC — SIGNIFICANT CHANGE UP (ref 27.5–37.4)
AST SERPL-CCNC: 16 U/L — SIGNIFICANT CHANGE UP (ref 15–37)
BASOPHILS # BLD AUTO: 0 K/UL — SIGNIFICANT CHANGE UP (ref 0–0.2)
BASOPHILS NFR BLD AUTO: 0 % — SIGNIFICANT CHANGE UP (ref 0–2)
BILIRUB SERPL-MCNC: 0.3 MG/DL — SIGNIFICANT CHANGE UP (ref 0.2–1.2)
BUN SERPL-MCNC: 17 MG/DL — SIGNIFICANT CHANGE UP (ref 7–23)
CALCIUM SERPL-MCNC: 9.2 MG/DL — SIGNIFICANT CHANGE UP (ref 8.5–10.1)
CHLORIDE SERPL-SCNC: 105 MMOL/L — SIGNIFICANT CHANGE UP (ref 96–108)
CO2 SERPL-SCNC: 28 MMOL/L — SIGNIFICANT CHANGE UP (ref 22–31)
CREAT SERPL-MCNC: 1.09 MG/DL — SIGNIFICANT CHANGE UP (ref 0.5–1.3)
EOSINOPHIL # BLD AUTO: 0 K/UL — SIGNIFICANT CHANGE UP (ref 0–0.5)
EOSINOPHIL NFR BLD AUTO: 0 % — SIGNIFICANT CHANGE UP (ref 0–6)
GLUCOSE SERPL-MCNC: 159 MG/DL — HIGH (ref 70–99)
HCT VFR BLD CALC: 34.2 % — LOW (ref 34.5–45)
HGB BLD-MCNC: 10.5 G/DL — LOW (ref 11.5–15.5)
INR BLD: 1.06 RATIO — SIGNIFICANT CHANGE UP (ref 0.88–1.16)
LIDOCAIN IGE QN: 93 U/L — SIGNIFICANT CHANGE UP (ref 73–393)
LYMPHOCYTES # BLD AUTO: 0.42 K/UL — LOW (ref 1–3.3)
LYMPHOCYTES # BLD AUTO: 3 % — LOW (ref 13–44)
MANUAL SMEAR VERIFICATION: SIGNIFICANT CHANGE UP
MCHC RBC-ENTMCNC: 26.8 PG — LOW (ref 27–34)
MCHC RBC-ENTMCNC: 30.7 GM/DL — LOW (ref 32–36)
MCV RBC AUTO: 87.2 FL — SIGNIFICANT CHANGE UP (ref 80–100)
METAMYELOCYTES # FLD: 12 % — HIGH (ref 0–0)
MONOCYTES # BLD AUTO: 2.24 K/UL — HIGH (ref 0–0.9)
MONOCYTES NFR BLD AUTO: 16 % — HIGH (ref 2–14)
MYELOCYTES NFR BLD: 8 % — HIGH (ref 0–0)
NEUTROPHILS # BLD AUTO: 8.55 K/UL — HIGH (ref 1.8–7.4)
NEUTROPHILS NFR BLD AUTO: 52 % — SIGNIFICANT CHANGE UP (ref 43–77)
NEUTS BAND # BLD: 9 % — HIGH (ref 0–8)
NRBC # BLD: 3 /100 — HIGH (ref 0–0)
NRBC # BLD: SIGNIFICANT CHANGE UP /100 WBCS (ref 0–0)
PLAT MORPH BLD: NORMAL — SIGNIFICANT CHANGE UP
PLATELET # BLD AUTO: 81 K/UL — LOW (ref 150–400)
PLATELET COUNT - ESTIMATE: ABNORMAL
POLYCHROMASIA BLD QL SMEAR: SLIGHT — SIGNIFICANT CHANGE UP
POTASSIUM SERPL-MCNC: 3.6 MMOL/L — SIGNIFICANT CHANGE UP (ref 3.5–5.3)
POTASSIUM SERPL-SCNC: 3.6 MMOL/L — SIGNIFICANT CHANGE UP (ref 3.5–5.3)
PROT SERPL-MCNC: 7.2 GM/DL — SIGNIFICANT CHANGE UP (ref 6–8.3)
PROTHROM AB SERPL-ACNC: 11.5 SEC — SIGNIFICANT CHANGE UP (ref 9.8–12.7)
RBC # BLD: 3.92 M/UL — SIGNIFICANT CHANGE UP (ref 3.8–5.2)
RBC # FLD: 22.2 % — HIGH (ref 10.3–14.5)
RBC BLD AUTO: ABNORMAL
SODIUM SERPL-SCNC: 141 MMOL/L — SIGNIFICANT CHANGE UP (ref 135–145)
WBC # BLD: 14.02 K/UL — HIGH (ref 3.8–10.5)
WBC # FLD AUTO: 14.02 K/UL — HIGH (ref 3.8–10.5)

## 2018-06-19 PROCEDURE — 99285 EMERGENCY DEPT VISIT HI MDM: CPT | Mod: 25

## 2018-06-19 RX ORDER — LIDOCAINE 4 G/100G
10 CREAM TOPICAL ONCE
Qty: 0 | Refills: 0 | Status: COMPLETED | OUTPATIENT
Start: 2018-06-19 | End: 2018-06-19

## 2018-06-19 RX ORDER — KETOROLAC TROMETHAMINE 30 MG/ML
15 SYRINGE (ML) INJECTION ONCE
Qty: 0 | Refills: 0 | Status: DISCONTINUED | OUTPATIENT
Start: 2018-06-19 | End: 2018-06-19

## 2018-06-19 RX ORDER — FAMOTIDINE 10 MG/ML
20 INJECTION INTRAVENOUS ONCE
Qty: 0 | Refills: 0 | Status: COMPLETED | OUTPATIENT
Start: 2018-06-19 | End: 2018-06-19

## 2018-06-19 RX ORDER — SODIUM CHLORIDE 9 MG/ML
1000 INJECTION INTRAMUSCULAR; INTRAVENOUS; SUBCUTANEOUS ONCE
Qty: 0 | Refills: 0 | Status: COMPLETED | OUTPATIENT
Start: 2018-06-19 | End: 2018-06-19

## 2018-06-19 RX ORDER — SODIUM CHLORIDE 9 MG/ML
3 INJECTION INTRAMUSCULAR; INTRAVENOUS; SUBCUTANEOUS ONCE
Qty: 0 | Refills: 0 | Status: COMPLETED | OUTPATIENT
Start: 2018-06-19 | End: 2018-06-19

## 2018-06-19 RX ADMIN — Medication 15 MILLIGRAM(S): at 02:57

## 2018-06-19 RX ADMIN — LIDOCAINE 10 MILLILITER(S): 4 CREAM TOPICAL at 02:57

## 2018-06-19 RX ADMIN — Medication 30 MILLILITER(S): at 02:57

## 2018-06-19 RX ADMIN — SODIUM CHLORIDE 3 MILLILITER(S): 9 INJECTION INTRAMUSCULAR; INTRAVENOUS; SUBCUTANEOUS at 03:02

## 2018-06-19 RX ADMIN — FAMOTIDINE 20 MILLIGRAM(S): 10 INJECTION INTRAVENOUS at 02:57

## 2018-06-19 RX ADMIN — SODIUM CHLORIDE 1000 MILLILITER(S): 9 INJECTION INTRAMUSCULAR; INTRAVENOUS; SUBCUTANEOUS at 02:34

## 2018-06-19 NOTE — ED ADULT NURSE NOTE - OBJECTIVE STATEMENT
Pt. c/o of abdominal pain with vomiting with chills since last night. Pt. is currently being treated for lymphoma with last treatment 3 weeks ago and upcoming treatment this tomorrow.  Pt. denies fever, bloody stools, burning urination, HA, CP, SOB, back pain, flank pain. Pt. is in center upper abdomen. Pt. states she ate dinner and then threw up with 4 episodes after, pt. took 8mg PO zofran she has at home and vomiting stopped.

## 2018-06-19 NOTE — ED PROVIDER NOTE - OBJECTIVE STATEMENT
82 y/o female in ED c/o n/v/epigastric pain x 6 hours s/p blending her food for dinner.  pt with h/o CA on chemo.  pt states took zofran with some relief.  no sick contacts or recent travel.   pt denies any fever, HA, cp, sob, diarrhea.

## 2018-06-19 NOTE — ED ADULT NURSE NOTE - CHPI ED SYMPTOMS NEG
no blood in stool/no abdominal distension/no dysuria/no burning urination/no hematuria/no diarrhea/no fever

## 2018-06-19 NOTE — ED ADULT TRIAGE NOTE - CHIEF COMPLAINT QUOTE
Abd pain and vomiting since 5pm. Abd pain and vomiting since 5pm. Currently on chemo. Seen in ED 2 weeks ago for same.

## 2018-06-28 ENCOUNTER — EMERGENCY (EMERGENCY)
Facility: HOSPITAL | Age: 83
LOS: 0 days | Discharge: HOME | End: 2018-06-28
Attending: EMERGENCY MEDICINE | Admitting: EMERGENCY MEDICINE
Payer: MEDICARE

## 2018-06-28 ENCOUNTER — TRANSCRIPTION ENCOUNTER (OUTPATIENT)
Age: 83
End: 2018-06-28

## 2018-06-28 VITALS
SYSTOLIC BLOOD PRESSURE: 112 MMHG | DIASTOLIC BLOOD PRESSURE: 52 MMHG | TEMPERATURE: 98 F | HEIGHT: 64 IN | HEART RATE: 89 BPM | RESPIRATION RATE: 17 BRPM | OXYGEN SATURATION: 98 % | WEIGHT: 128.97 LBS

## 2018-06-28 VITALS
TEMPERATURE: 98 F | SYSTOLIC BLOOD PRESSURE: 109 MMHG | OXYGEN SATURATION: 98 % | RESPIRATION RATE: 17 BRPM | DIASTOLIC BLOOD PRESSURE: 57 MMHG | HEART RATE: 97 BPM

## 2018-06-28 DIAGNOSIS — Z79.84 LONG TERM (CURRENT) USE OF ORAL HYPOGLYCEMIC DRUGS: ICD-10-CM

## 2018-06-28 DIAGNOSIS — D64.9 ANEMIA, UNSPECIFIED: ICD-10-CM

## 2018-06-28 DIAGNOSIS — R00.2 PALPITATIONS: ICD-10-CM

## 2018-06-28 DIAGNOSIS — K21.9 GASTRO-ESOPHAGEAL REFLUX DISEASE WITHOUT ESOPHAGITIS: ICD-10-CM

## 2018-06-28 DIAGNOSIS — I48.91 UNSPECIFIED ATRIAL FIBRILLATION: ICD-10-CM

## 2018-06-28 DIAGNOSIS — Z90.710 ACQUIRED ABSENCE OF BOTH CERVIX AND UTERUS: ICD-10-CM

## 2018-06-28 DIAGNOSIS — Z90.49 ACQUIRED ABSENCE OF OTHER SPECIFIED PARTS OF DIGESTIVE TRACT: Chronic | ICD-10-CM

## 2018-06-28 DIAGNOSIS — Z98.890 OTHER SPECIFIED POSTPROCEDURAL STATES: ICD-10-CM

## 2018-06-28 DIAGNOSIS — Z90.49 ACQUIRED ABSENCE OF OTHER SPECIFIED PARTS OF DIGESTIVE TRACT: ICD-10-CM

## 2018-06-28 DIAGNOSIS — Z79.899 OTHER LONG TERM (CURRENT) DRUG THERAPY: ICD-10-CM

## 2018-06-28 DIAGNOSIS — C85.90 NON-HODGKIN LYMPHOMA, UNSPECIFIED, UNSPECIFIED SITE: ICD-10-CM

## 2018-06-28 DIAGNOSIS — Z90.710 ACQUIRED ABSENCE OF BOTH CERVIX AND UTERUS: Chronic | ICD-10-CM

## 2018-06-28 DIAGNOSIS — E11.9 TYPE 2 DIABETES MELLITUS WITHOUT COMPLICATIONS: ICD-10-CM

## 2018-06-28 DIAGNOSIS — N18.3 CHRONIC KIDNEY DISEASE, STAGE 3 (MODERATE): ICD-10-CM

## 2018-06-28 LAB
ALBUMIN SERPL ELPH-MCNC: 3.2 G/DL — LOW (ref 3.3–5)
ALP SERPL-CCNC: 63 U/L — SIGNIFICANT CHANGE UP (ref 40–120)
ALT FLD-CCNC: 15 U/L — SIGNIFICANT CHANGE UP (ref 12–78)
ANION GAP SERPL CALC-SCNC: 8 MMOL/L — SIGNIFICANT CHANGE UP (ref 5–17)
ANISOCYTOSIS BLD QL: SLIGHT — SIGNIFICANT CHANGE UP
AST SERPL-CCNC: 13 U/L — LOW (ref 15–37)
BASOPHILS # BLD AUTO: 0 K/UL — SIGNIFICANT CHANGE UP (ref 0–0.2)
BASOPHILS NFR BLD AUTO: 0 % — SIGNIFICANT CHANGE UP (ref 0–2)
BILIRUB SERPL-MCNC: 0.6 MG/DL — SIGNIFICANT CHANGE UP (ref 0.2–1.2)
BUN SERPL-MCNC: 19 MG/DL — SIGNIFICANT CHANGE UP (ref 7–23)
CALCIUM SERPL-MCNC: 8.9 MG/DL — SIGNIFICANT CHANGE UP (ref 8.5–10.1)
CHLORIDE SERPL-SCNC: 104 MMOL/L — SIGNIFICANT CHANGE UP (ref 96–108)
CO2 SERPL-SCNC: 29 MMOL/L — SIGNIFICANT CHANGE UP (ref 22–31)
CREAT SERPL-MCNC: 1.01 MG/DL — SIGNIFICANT CHANGE UP (ref 0.5–1.3)
DACRYOCYTES BLD QL SMEAR: SLIGHT — SIGNIFICANT CHANGE UP
EOSINOPHIL # BLD AUTO: 0 K/UL — SIGNIFICANT CHANGE UP (ref 0–0.5)
EOSINOPHIL NFR BLD AUTO: 0 % — SIGNIFICANT CHANGE UP (ref 0–6)
GLUCOSE SERPL-MCNC: 129 MG/DL — HIGH (ref 70–99)
HCT VFR BLD CALC: 31.9 % — LOW (ref 34.5–45)
HGB BLD-MCNC: 9.7 G/DL — LOW (ref 11.5–15.5)
LYMPHOCYTES # BLD AUTO: 1.22 K/UL — SIGNIFICANT CHANGE UP (ref 1–3.3)
LYMPHOCYTES # BLD AUTO: 10 % — LOW (ref 13–44)
MAGNESIUM SERPL-MCNC: 1.6 MG/DL — SIGNIFICANT CHANGE UP (ref 1.6–2.6)
MANUAL SMEAR VERIFICATION: SIGNIFICANT CHANGE UP
MCHC RBC-ENTMCNC: 26.4 PG — LOW (ref 27–34)
MCHC RBC-ENTMCNC: 30.4 GM/DL — LOW (ref 32–36)
MCV RBC AUTO: 86.9 FL — SIGNIFICANT CHANGE UP (ref 80–100)
METAMYELOCYTES # FLD: 8 % — HIGH (ref 0–0)
MONOCYTES # BLD AUTO: 0 K/UL — SIGNIFICANT CHANGE UP (ref 0–0.9)
MONOCYTES NFR BLD AUTO: 0 % — LOW (ref 2–14)
MYELOCYTES NFR BLD: 6 % — HIGH (ref 0–0)
NEUTROPHILS # BLD AUTO: 9.17 K/UL — HIGH (ref 1.8–7.4)
NEUTROPHILS NFR BLD AUTO: 75 % — SIGNIFICANT CHANGE UP (ref 43–77)
NRBC # BLD: 0 /100 — SIGNIFICANT CHANGE UP (ref 0–0)
NRBC # BLD: SIGNIFICANT CHANGE UP /100 WBCS (ref 0–0)
OVALOCYTES BLD QL SMEAR: SLIGHT — SIGNIFICANT CHANGE UP
PLAT MORPH BLD: NORMAL — SIGNIFICANT CHANGE UP
PLATELET # BLD AUTO: 92 K/UL — LOW (ref 150–400)
POLYCHROMASIA BLD QL SMEAR: SLIGHT — SIGNIFICANT CHANGE UP
POTASSIUM SERPL-MCNC: 3.6 MMOL/L — SIGNIFICANT CHANGE UP (ref 3.5–5.3)
POTASSIUM SERPL-SCNC: 3.6 MMOL/L — SIGNIFICANT CHANGE UP (ref 3.5–5.3)
PROMYELOCYTES # FLD: 1 % — HIGH (ref 0–0)
PROT SERPL-MCNC: 6.4 GM/DL — SIGNIFICANT CHANGE UP (ref 6–8.3)
RBC # BLD: 3.67 M/UL — LOW (ref 3.8–5.2)
RBC # FLD: 20.1 % — HIGH (ref 10.3–14.5)
RBC BLD AUTO: ABNORMAL
SODIUM SERPL-SCNC: 141 MMOL/L — SIGNIFICANT CHANGE UP (ref 135–145)
SPHEROCYTES BLD QL SMEAR: SLIGHT — SIGNIFICANT CHANGE UP
TROPONIN I SERPL-MCNC: 0.02 NG/ML — SIGNIFICANT CHANGE UP (ref 0.01–0.04)
WBC # BLD: 12.23 K/UL — HIGH (ref 3.8–10.5)
WBC # FLD AUTO: 12.23 K/UL — HIGH (ref 3.8–10.5)

## 2018-06-28 PROCEDURE — 99285 EMERGENCY DEPT VISIT HI MDM: CPT | Mod: 25

## 2018-06-28 PROCEDURE — 93010 ELECTROCARDIOGRAM REPORT: CPT

## 2018-06-28 RX ORDER — APIXABAN 2.5 MG/1
1 TABLET, FILM COATED ORAL
Qty: 60 | Refills: 0 | OUTPATIENT
Start: 2018-06-28

## 2018-06-28 RX ORDER — APIXABAN 2.5 MG/1
2.5 TABLET, FILM COATED ORAL EVERY 12 HOURS
Qty: 0 | Refills: 0 | Status: DISCONTINUED | OUTPATIENT
Start: 2018-06-28 | End: 2018-06-28

## 2018-06-28 RX ORDER — PANTOPRAZOLE SODIUM 20 MG/1
40 TABLET, DELAYED RELEASE ORAL
Qty: 0 | Refills: 0 | Status: DISCONTINUED | OUTPATIENT
Start: 2018-06-28 | End: 2018-06-28

## 2018-06-28 RX ORDER — IPRATROPIUM BROMIDE 21 MCG
2 AEROSOL, SPRAY (ML) NASAL
Qty: 0 | Refills: 0 | COMMUNITY

## 2018-06-28 RX ORDER — FLUTICASONE PROPIONATE 220 MCG
0 AEROSOL WITH ADAPTER (GRAM) INHALATION
Qty: 0 | Refills: 0 | COMMUNITY

## 2018-06-28 RX ORDER — METOPROLOL TARTRATE 50 MG
0.5 TABLET ORAL
Qty: 0 | Refills: 0 | COMMUNITY
Start: 2018-06-28

## 2018-06-28 RX ORDER — METOPROLOL TARTRATE 50 MG
1 TABLET ORAL
Qty: 60 | Refills: 0 | OUTPATIENT
Start: 2018-06-28

## 2018-06-28 RX ORDER — SODIUM CHLORIDE 9 MG/ML
1000 INJECTION INTRAMUSCULAR; INTRAVENOUS; SUBCUTANEOUS ONCE
Qty: 0 | Refills: 0 | Status: COMPLETED | OUTPATIENT
Start: 2018-06-28 | End: 2018-06-28

## 2018-06-28 RX ORDER — METOPROLOL TARTRATE 50 MG
25 TABLET ORAL
Qty: 0 | Refills: 0 | Status: DISCONTINUED | OUTPATIENT
Start: 2018-06-28 | End: 2018-06-28

## 2018-06-28 RX ADMIN — SODIUM CHLORIDE 1000 MILLILITER(S): 9 INJECTION INTRAMUSCULAR; INTRAVENOUS; SUBCUTANEOUS at 05:51

## 2018-06-28 RX ADMIN — PANTOPRAZOLE SODIUM 40 MILLIGRAM(S): 20 TABLET, DELAYED RELEASE ORAL at 08:54

## 2018-06-28 RX ADMIN — APIXABAN 2.5 MILLIGRAM(S): 2.5 TABLET, FILM COATED ORAL at 08:54

## 2018-06-28 RX ADMIN — Medication 25 MILLIGRAM(S): at 08:54

## 2018-06-28 NOTE — ED ADULT TRIAGE NOTE - CHIEF COMPLAINT QUOTE
Pt presents to ER after being awoken from her sleep at 0200 AM w/ palpitations. took a 325mg ASA PTA @ 0300.

## 2018-06-28 NOTE — DISCHARGE NOTE ADULT - CARE PROVIDER_API CALL
Catrachita García (), [facility]  [address]  [place] [address]  Phone: [phone]  Fax: [phone]    Bipin Mcclelland), Cardiovascular Disease; Internal Medicine  [address] [place] [id number]  Phone: [phone]  Fax: [phone]    Patti Polanco), Hematology; Medical Oncology  [address]  [address]  [place] [address]  Phone: [phone]  Fax: [phone]

## 2018-06-28 NOTE — DISCHARGE NOTE ADULT - PATIENT PORTAL LINK FT
You can access the [facility] Patient Portal, offered by [facility], by registering with the following website: [email or web address]

## 2018-06-28 NOTE — DISCHARGE NOTE ADULT - MEDICATION SUMMARY - MEDICATIONS TO TAKE
I will START or STAY ON the medications listed below when I get home from the hospital:    apixaban 2.5 mg oral tablet  -- 1 tab(s) by mouth every 12 hours  -- Indication: For Atrial fibrillation    metFORMIN 500 mg oral tablet  -- 1 tab(s) by mouth 2 times a day  -- Indication: For diabetes    metoprolol tartrate 25 mg oral tablet  -- 1 tab(s) by mouth 2 times a day  -- Indication: For Atrial fibrillation    omeprazole 40 mg oral delayed release capsule  -- 1 cap(s) by mouth once a day  -- Indication: For GERD

## 2018-06-28 NOTE — ED ADULT NURSE NOTE - OBJECTIVE STATEMENT
pt. son states pt. woke up at approx. 230am and felt palpitations, dizziness and "weird feeling in her left arm. pt. son states she took 325mg at 4am and "feels better" but states he took her pulse and it felt irregular. Pt. Denies HA, SOB, fever, chills, n/v/d, numbness, weakness. pt. has hx of lymphoma and had last chemo treatment 7 days ago.

## 2018-06-28 NOTE — DISCHARGE NOTE ADULT - PLAN OF CARE
heart rate control, prevent stroke please continue to take eliquis and metoprolol, follow up with your cardiologist as soon as possible treat continue chemotherapy as per Dr. Polanco sugar control continue to take metformin, follow up with PCP

## 2018-06-28 NOTE — DISCHARGE NOTE ADULT - CARE PLAN
Principal Discharge DX:	Atrial fibrillation, unspecified type  Goal:	heart rate control, prevent stroke  Assessment and plan of treatment:	please continue to take eliquis and metoprolol, follow up with your cardiologist as soon as possible  Secondary Diagnosis:	Lymphoma, non-Hodgkin's  Goal:	treat  Assessment and plan of treatment:	continue chemotherapy as per Dr. Polanco  Secondary Diagnosis:	Diabetes  Goal:	sugar control  Assessment and plan of treatment:	continue to take metformin, follow up with PCP

## 2018-06-28 NOTE — ED PROVIDER NOTE - MEDICAL DECISION MAKING DETAILS
new onset afib, will check labs, give fluids and monitor; will admit new onset afib, will check labs, give fluids and monitor; will admit.

## 2018-06-28 NOTE — DISCHARGE NOTE ADULT - HOSPITAL COURSE
PMD: Dr. García  Cardio: Dr. Mcclelland  Onc: Dr. Polanco    82 yo female with PMH of non hodgkins lymphoma on chemo, NIDDM, CKD, GERD, HTN, HLD who presented to ED with complaint of palpitations. Pt woke up around 3AM with complaint of palpitations. Palpitations associated with heaviness in arm but no chest pain. Also was diaphoretic and lightheaded. Pt recently started chemo for lymphoma 3 weeks ago with last chemo 8 days ago. She is also on Neupogen for leukopenia. She denies any fevers, chills, headaches, dizziness, chest pain, SOB, abd pain, N/V, diarrhea. While in ED pt was found to be in new onset A. fib/flutter. She was given cardizem 5mg IV push and she has remained with a controlled HR 80-90s. Pt feels well and prefers to be discharged home.    PMH: as above  PSxH: hysterectomy, appendectomy cholecystectomy  FH: Mother pancreatic CA, father COPD, smoker  SH: denies etoh, tobacco, or drug use  Allergies: penicillin (rash)    ROS: negative unless stated above in HPI    Vital Signs Last 24 Hrs  T(C): 36.6 (28 Jun 2018 06:57), Max: 36.8 (28 Jun 2018 05:11)  T(F): 97.9 (28 Jun 2018 06:57), Max: 98.3 (28 Jun 2018 05:11)  HR: 97 (28 Jun 2018 06:57) (89 - 113)  BP: 109/57 (28 Jun 2018 06:57) (109/57 - 137/56)  BP(mean): --  RR: 17 (28 Jun 2018 06:57) (17 - 17)  SpO2: 98% (28 Jun 2018 06:57) (98% - 98%)    PHYSICAL EXAM:      Constitutional: NAD, well-groomed, well-developed  HEENT: PERRLA, EOMI, Normal Hearing  Neck: No LAD, No JVD  Back: Normal spine flexure, No CVA tenderness  Cardiovascular: S1 and S2, irregularly irregular, 2/6 systolic murmur  Respiratory: CTAB  Gastrointestinal: BS+, soft, NT/ND  Extremities: No peripheral edema  Vascular: 2+ peripheral pulses  Neurological: A/O x 3, no focal deficits  Psychiatric: Normal mood, normal affect  Musculoskeletal: 5/5 strength b/l upper and lower extremities  Skin: No rashes    Labs    CARDIAC MARKERS ( 28 Jun 2018 05:07 )  0.020 ng/mL / x     / x     / x     / x                                9.7    12.23 )-----------( 92       ( 28 Jun 2018 05:07 )             31.9     28 Jun 2018 05:07    141    |  104    |  19     ----------------------------<  129    3.6     |  29     |  1.01     Ca    8.9        28 Jun 2018 05:07  Mg     1.6       28 Jun 2018 05:07    TPro  6.4    /  Alb  3.2    /  TBili  0.6    /  DBili  x      /  AST  13     /  ALT  15     /  AlkPhos  63     28 Jun 2018 05:07      LIVER FUNCTIONS - ( 28 Jun 2018 05:07 )  Alb: 3.2 g/dL / Pro: 6.4 gm/dL / ALK PHOS: 63 U/L / ALT: 15 U/L / AST: 13 U/L / GGT: x           82 yo female with PMH of non hodgkins lymphoma on chemo, NIDDM, CKD, GERD, HTN, HLD presented with new onset a. fib/flutter    #New onset a. fib  - pt medically stable for d/c home  - HR currently controlled, pt with no complaints  - CHADsVasc =3  - will start eliquis 2.5mg BID and metoprolol 25mg BID  - discussed above with on call cardio Dr. Ward and also updated pt's primary cardiologist Dr. Mcclelland, will follow up with Dr. Mcclelland ASAP    #Leukocytosis  - no signs on infection, afebrile  - likely secondary to neupogen injections  - f/u with Dr. Polanco today    #non hodgkins lymphoma  - continue chemo as per oncology Dr. Polanco    #DM2  - continue metformin  - f/u PMD    dispo: d/c home with outpatient PCP, cardio and oncology follow up

## 2018-06-28 NOTE — ED PROVIDER NOTE - OBJECTIVE STATEMENT
82 yo female with h/o Lymphoma, currently undergoing chemo (last treatment last week), Neutropenic and receiving Neupogen.  Pt woke from sleep 230am with palpitations and a "funny feeling" in the left arm.  No chest pain, no sob.  Pt with no prior h/o afib.  Took aspirin 325mg before coming.

## 2018-07-02 ENCOUNTER — INPATIENT (INPATIENT)
Facility: HOSPITAL | Age: 83
LOS: 2 days | Discharge: HOME HEALTH | End: 2018-07-05
Attending: SURGERY | Admitting: SURGERY
Payer: MEDICARE

## 2018-07-02 VITALS
SYSTOLIC BLOOD PRESSURE: 148 MMHG | HEIGHT: 64 IN | HEART RATE: 82 BPM | WEIGHT: 128.97 LBS | DIASTOLIC BLOOD PRESSURE: 67 MMHG | TEMPERATURE: 99 F | OXYGEN SATURATION: 100 %

## 2018-07-02 DIAGNOSIS — Z90.49 ACQUIRED ABSENCE OF OTHER SPECIFIED PARTS OF DIGESTIVE TRACT: Chronic | ICD-10-CM

## 2018-07-02 DIAGNOSIS — Z90.710 ACQUIRED ABSENCE OF BOTH CERVIX AND UTERUS: Chronic | ICD-10-CM

## 2018-07-02 LAB
ALBUMIN SERPL ELPH-MCNC: 3.4 G/DL — SIGNIFICANT CHANGE UP (ref 3.3–5)
ALP SERPL-CCNC: 86 U/L — SIGNIFICANT CHANGE UP (ref 40–120)
ALT FLD-CCNC: 12 U/L — SIGNIFICANT CHANGE UP (ref 12–78)
ANION GAP SERPL CALC-SCNC: 7 MMOL/L — SIGNIFICANT CHANGE UP (ref 5–17)
ANION GAP SERPL CALC-SCNC: 8 MMOL/L — SIGNIFICANT CHANGE UP (ref 5–17)
ANISOCYTOSIS BLD QL: SLIGHT — SIGNIFICANT CHANGE UP
APTT BLD: 29.9 SEC — SIGNIFICANT CHANGE UP (ref 27.5–37.4)
AST SERPL-CCNC: 17 U/L — SIGNIFICANT CHANGE UP (ref 15–37)
BASO STIPL BLD QL SMEAR: PRESENT — SIGNIFICANT CHANGE UP
BASOPHILS # BLD AUTO: 0 K/UL — SIGNIFICANT CHANGE UP (ref 0–0.2)
BASOPHILS NFR BLD AUTO: 0 % — SIGNIFICANT CHANGE UP (ref 0–2)
BASOPHILS NFR BLD AUTO: 0 % — SIGNIFICANT CHANGE UP (ref 0–2)
BILIRUB SERPL-MCNC: 0.4 MG/DL — SIGNIFICANT CHANGE UP (ref 0.2–1.2)
BUN SERPL-MCNC: 6 MG/DL — LOW (ref 7–23)
BUN SERPL-MCNC: 7 MG/DL — SIGNIFICANT CHANGE UP (ref 7–23)
CALCIUM SERPL-MCNC: 8.7 MG/DL — SIGNIFICANT CHANGE UP (ref 8.5–10.1)
CALCIUM SERPL-MCNC: 9.2 MG/DL — SIGNIFICANT CHANGE UP (ref 8.5–10.1)
CHLORIDE SERPL-SCNC: 103 MMOL/L — SIGNIFICANT CHANGE UP (ref 96–108)
CHLORIDE SERPL-SCNC: 104 MMOL/L — SIGNIFICANT CHANGE UP (ref 96–108)
CO2 SERPL-SCNC: 29 MMOL/L — SIGNIFICANT CHANGE UP (ref 22–31)
CO2 SERPL-SCNC: 30 MMOL/L — SIGNIFICANT CHANGE UP (ref 22–31)
CREAT SERPL-MCNC: 1.01 MG/DL — SIGNIFICANT CHANGE UP (ref 0.5–1.3)
CREAT SERPL-MCNC: 1.15 MG/DL — SIGNIFICANT CHANGE UP (ref 0.5–1.3)
DACRYOCYTES BLD QL SMEAR: SLIGHT — SIGNIFICANT CHANGE UP
DACRYOCYTES BLD QL SMEAR: SLIGHT — SIGNIFICANT CHANGE UP
DOHLE BOD BLD QL SMEAR: PRESENT — SIGNIFICANT CHANGE UP
ELLIPTOCYTES BLD QL SMEAR: SLIGHT — SIGNIFICANT CHANGE UP
ELLIPTOCYTES BLD QL SMEAR: SLIGHT — SIGNIFICANT CHANGE UP
EOSINOPHIL # BLD AUTO: 0 K/UL — SIGNIFICANT CHANGE UP (ref 0–0.5)
EOSINOPHIL NFR BLD AUTO: 0 % — SIGNIFICANT CHANGE UP (ref 0–6)
EOSINOPHIL NFR BLD AUTO: 0 % — SIGNIFICANT CHANGE UP (ref 0–6)
GLUCOSE BLDC GLUCOMTR-MCNC: 100 MG/DL — HIGH (ref 70–99)
GLUCOSE BLDC GLUCOMTR-MCNC: 90 MG/DL — SIGNIFICANT CHANGE UP (ref 70–99)
GLUCOSE BLDC GLUCOMTR-MCNC: 91 MG/DL — SIGNIFICANT CHANGE UP (ref 70–99)
GLUCOSE BLDC GLUCOMTR-MCNC: 92 MG/DL — SIGNIFICANT CHANGE UP (ref 70–99)
GLUCOSE SERPL-MCNC: 100 MG/DL — HIGH (ref 70–99)
GLUCOSE SERPL-MCNC: 92 MG/DL — SIGNIFICANT CHANGE UP (ref 70–99)
HBA1C BLD-MCNC: 5.6 % — SIGNIFICANT CHANGE UP (ref 4–5.6)
HCT VFR BLD CALC: 29.4 % — LOW (ref 34.5–45)
HCT VFR BLD CALC: 30.8 % — LOW (ref 34.5–45)
HGB BLD-MCNC: 9.1 G/DL — LOW (ref 11.5–15.5)
HGB BLD-MCNC: 9.4 G/DL — LOW (ref 11.5–15.5)
HYPOCHROMIA BLD QL: SLIGHT — SIGNIFICANT CHANGE UP
HYPOCHROMIA BLD QL: SLIGHT — SIGNIFICANT CHANGE UP
IMM GRANULOCYTES NFR BLD AUTO: 0 % — SIGNIFICANT CHANGE UP (ref 0–1.5)
INR BLD: 1.45 RATIO — HIGH (ref 0.88–1.16)
INR BLD: 1.5 RATIO — HIGH (ref 0.88–1.16)
LACTATE SERPL-SCNC: 1 MMOL/L — SIGNIFICANT CHANGE UP (ref 0.7–2)
LIDOCAIN IGE QN: 48 U/L — LOW (ref 73–393)
LYMPHOCYTES # BLD AUTO: 0.66 K/UL — LOW (ref 1–3.3)
LYMPHOCYTES # BLD AUTO: 0.98 K/UL — LOW (ref 1–3.3)
LYMPHOCYTES # BLD AUTO: 5 % — LOW (ref 13–44)
LYMPHOCYTES # BLD AUTO: 7 % — LOW (ref 13–44)
MACROCYTES BLD QL: SLIGHT — SIGNIFICANT CHANGE UP
MANUAL DIF COMMENT BLD-IMP: SIGNIFICANT CHANGE UP
MANUAL SMEAR VERIFICATION: SIGNIFICANT CHANGE UP
MANUAL SMEAR VERIFICATION: SIGNIFICANT CHANGE UP
MCHC RBC-ENTMCNC: 27 PG — SIGNIFICANT CHANGE UP (ref 27–34)
MCHC RBC-ENTMCNC: 27.3 PG — SIGNIFICANT CHANGE UP (ref 27–34)
MCHC RBC-ENTMCNC: 30.5 GM/DL — LOW (ref 32–36)
MCHC RBC-ENTMCNC: 31 GM/DL — LOW (ref 32–36)
MCV RBC AUTO: 88.3 FL — SIGNIFICANT CHANGE UP (ref 80–100)
MCV RBC AUTO: 88.5 FL — SIGNIFICANT CHANGE UP (ref 80–100)
METAMYELOCYTES # FLD: 2 % — HIGH (ref 0–0)
METAMYELOCYTES # FLD: 3 % — HIGH (ref 0–0)
MICROCYTES BLD QL: SLIGHT — SIGNIFICANT CHANGE UP
MICROCYTES BLD QL: SLIGHT — SIGNIFICANT CHANGE UP
MONOCYTES # BLD AUTO: 2.22 K/UL — HIGH (ref 0–0.9)
MONOCYTES # BLD AUTO: 2.66 K/UL — HIGH (ref 0–0.9)
MONOCYTES NFR BLD AUTO: 17 % — HIGH (ref 2–14)
MONOCYTES NFR BLD AUTO: 19 % — HIGH (ref 2–14)
NEUTROPHILS # BLD AUTO: 10.26 K/UL — HIGH (ref 1.8–7.4)
NEUTROPHILS # BLD AUTO: 9.95 K/UL — HIGH (ref 1.8–7.4)
NEUTROPHILS NFR BLD AUTO: 64 % — SIGNIFICANT CHANGE UP (ref 43–77)
NEUTROPHILS NFR BLD AUTO: 71 % — SIGNIFICANT CHANGE UP (ref 43–77)
NEUTS BAND # BLD: 12 % — HIGH (ref 0–8)
NRBC # BLD: 0 /100 — SIGNIFICANT CHANGE UP (ref 0–0)
NRBC # BLD: 0 /100 — SIGNIFICANT CHANGE UP (ref 0–0)
NRBC # BLD: SIGNIFICANT CHANGE UP /100 WBCS (ref 0–0)
NRBC # BLD: SIGNIFICANT CHANGE UP /100 WBCS (ref 0–0)
OVALOCYTES BLD QL SMEAR: SLIGHT — SIGNIFICANT CHANGE UP
PLAT MORPH BLD: NORMAL — SIGNIFICANT CHANGE UP
PLAT MORPH BLD: NORMAL — SIGNIFICANT CHANGE UP
PLATELET # BLD AUTO: 100 K/UL — LOW (ref 150–400)
PLATELET # BLD AUTO: 99 K/UL — LOW (ref 150–400)
PLATELET COUNT - ESTIMATE: ABNORMAL
POIKILOCYTOSIS BLD QL AUTO: SLIGHT — SIGNIFICANT CHANGE UP
POIKILOCYTOSIS BLD QL AUTO: SLIGHT — SIGNIFICANT CHANGE UP
POLYCHROMASIA BLD QL SMEAR: SLIGHT — SIGNIFICANT CHANGE UP
POTASSIUM SERPL-MCNC: 3.4 MMOL/L — LOW (ref 3.5–5.3)
POTASSIUM SERPL-MCNC: 3.5 MMOL/L — SIGNIFICANT CHANGE UP (ref 3.5–5.3)
POTASSIUM SERPL-SCNC: 3.4 MMOL/L — LOW (ref 3.5–5.3)
POTASSIUM SERPL-SCNC: 3.5 MMOL/L — SIGNIFICANT CHANGE UP (ref 3.5–5.3)
PROT SERPL-MCNC: 6.7 GM/DL — SIGNIFICANT CHANGE UP (ref 6–8.3)
PROTHROM AB SERPL-ACNC: 15.8 SEC — HIGH (ref 9.8–12.7)
PROTHROM AB SERPL-ACNC: 16.3 SEC — HIGH (ref 9.8–12.7)
RBC # BLD: 3.33 M/UL — LOW (ref 3.8–5.2)
RBC # BLD: 3.48 M/UL — LOW (ref 3.8–5.2)
RBC # FLD: 20.9 % — HIGH (ref 10.3–14.5)
RBC # FLD: 21 % — HIGH (ref 10.3–14.5)
RBC BLD AUTO: ABNORMAL
RBC BLD AUTO: ABNORMAL
SCHISTOCYTES BLD QL AUTO: SLIGHT — SIGNIFICANT CHANGE UP
SODIUM SERPL-SCNC: 140 MMOL/L — SIGNIFICANT CHANGE UP (ref 135–145)
SODIUM SERPL-SCNC: 141 MMOL/L — SIGNIFICANT CHANGE UP (ref 135–145)
WBC # BLD: 14.01 K/UL — HIGH (ref 3.8–10.5)
WBC # BLD: 15.47 K/UL — HIGH (ref 3.8–10.5)
WBC # FLD AUTO: 14.01 K/UL — HIGH (ref 3.8–10.5)
WBC # FLD AUTO: 15.47 K/UL — HIGH (ref 3.8–10.5)

## 2018-07-02 PROCEDURE — 74177 CT ABD & PELVIS W/CONTRAST: CPT | Mod: 26

## 2018-07-02 PROCEDURE — 93010 ELECTROCARDIOGRAM REPORT: CPT

## 2018-07-02 PROCEDURE — 99285 EMERGENCY DEPT VISIT HI MDM: CPT

## 2018-07-02 RX ORDER — DEXTROSE 50 % IN WATER 50 %
25 SYRINGE (ML) INTRAVENOUS ONCE
Qty: 0 | Refills: 0 | Status: DISCONTINUED | OUTPATIENT
Start: 2018-07-02 | End: 2018-07-05

## 2018-07-02 RX ORDER — INSULIN LISPRO 100/ML
VIAL (ML) SUBCUTANEOUS
Qty: 0 | Refills: 0 | Status: DISCONTINUED | OUTPATIENT
Start: 2018-07-02 | End: 2018-07-05

## 2018-07-02 RX ORDER — GLUCAGON INJECTION, SOLUTION 0.5 MG/.1ML
1 INJECTION, SOLUTION SUBCUTANEOUS ONCE
Qty: 0 | Refills: 0 | Status: DISCONTINUED | OUTPATIENT
Start: 2018-07-02 | End: 2018-07-05

## 2018-07-02 RX ORDER — SODIUM CHLORIDE 9 MG/ML
1000 INJECTION, SOLUTION INTRAVENOUS
Qty: 0 | Refills: 0 | Status: DISCONTINUED | OUTPATIENT
Start: 2018-07-02 | End: 2018-07-05

## 2018-07-02 RX ORDER — DEXTROSE 50 % IN WATER 50 %
15 SYRINGE (ML) INTRAVENOUS ONCE
Qty: 0 | Refills: 0 | Status: DISCONTINUED | OUTPATIENT
Start: 2018-07-02 | End: 2018-07-05

## 2018-07-02 RX ORDER — SODIUM CHLORIDE 9 MG/ML
1000 INJECTION INTRAMUSCULAR; INTRAVENOUS; SUBCUTANEOUS
Qty: 0 | Refills: 0 | Status: DISCONTINUED | OUTPATIENT
Start: 2018-07-02 | End: 2018-07-05

## 2018-07-02 RX ORDER — ONDANSETRON 8 MG/1
4 TABLET, FILM COATED ORAL EVERY 6 HOURS
Qty: 0 | Refills: 0 | Status: DISCONTINUED | OUTPATIENT
Start: 2018-07-02 | End: 2018-07-05

## 2018-07-02 RX ORDER — MORPHINE SULFATE 50 MG/1
4 CAPSULE, EXTENDED RELEASE ORAL ONCE
Qty: 0 | Refills: 0 | Status: DISCONTINUED | OUTPATIENT
Start: 2018-07-02 | End: 2018-07-02

## 2018-07-02 RX ORDER — MORPHINE SULFATE 50 MG/1
2 CAPSULE, EXTENDED RELEASE ORAL ONCE
Qty: 0 | Refills: 0 | Status: DISCONTINUED | OUTPATIENT
Start: 2018-07-02 | End: 2018-07-02

## 2018-07-02 RX ORDER — ONDANSETRON 8 MG/1
4 TABLET, FILM COATED ORAL ONCE
Qty: 0 | Refills: 0 | Status: COMPLETED | OUTPATIENT
Start: 2018-07-02 | End: 2018-07-02

## 2018-07-02 RX ORDER — DEXTROSE MONOHYDRATE, SODIUM CHLORIDE, AND POTASSIUM CHLORIDE 50; .745; 4.5 G/1000ML; G/1000ML; G/1000ML
1000 INJECTION, SOLUTION INTRAVENOUS
Qty: 0 | Refills: 0 | Status: DISCONTINUED | OUTPATIENT
Start: 2018-07-02 | End: 2018-07-05

## 2018-07-02 RX ORDER — APIXABAN 2.5 MG/1
2.5 TABLET, FILM COATED ORAL EVERY 12 HOURS
Qty: 0 | Refills: 0 | Status: DISCONTINUED | OUTPATIENT
Start: 2018-07-02 | End: 2018-07-02

## 2018-07-02 RX ORDER — HEPARIN SODIUM 5000 [USP'U]/ML
INJECTION INTRAVENOUS; SUBCUTANEOUS
Qty: 25000 | Refills: 0 | Status: DISCONTINUED | OUTPATIENT
Start: 2018-07-02 | End: 2018-07-05

## 2018-07-02 RX ORDER — HEPARIN SODIUM 5000 [USP'U]/ML
2000 INJECTION INTRAVENOUS; SUBCUTANEOUS EVERY 6 HOURS
Qty: 0 | Refills: 0 | Status: DISCONTINUED | OUTPATIENT
Start: 2018-07-02 | End: 2018-07-05

## 2018-07-02 RX ORDER — METOPROLOL TARTRATE 50 MG
2.5 TABLET ORAL EVERY 12 HOURS
Qty: 0 | Refills: 0 | Status: DISCONTINUED | OUTPATIENT
Start: 2018-07-02 | End: 2018-07-04

## 2018-07-02 RX ORDER — DEXTROSE 50 % IN WATER 50 %
12.5 SYRINGE (ML) INTRAVENOUS ONCE
Qty: 0 | Refills: 0 | Status: DISCONTINUED | OUTPATIENT
Start: 2018-07-02 | End: 2018-07-05

## 2018-07-02 RX ORDER — HYDROMORPHONE HYDROCHLORIDE 2 MG/ML
0.5 INJECTION INTRAMUSCULAR; INTRAVENOUS; SUBCUTANEOUS EVERY 4 HOURS
Qty: 0 | Refills: 0 | Status: DISCONTINUED | OUTPATIENT
Start: 2018-07-02 | End: 2018-07-05

## 2018-07-02 RX ORDER — HEPARIN SODIUM 5000 [USP'U]/ML
4500 INJECTION INTRAVENOUS; SUBCUTANEOUS EVERY 6 HOURS
Qty: 0 | Refills: 0 | Status: DISCONTINUED | OUTPATIENT
Start: 2018-07-02 | End: 2018-07-05

## 2018-07-02 RX ORDER — HYDROMORPHONE HYDROCHLORIDE 2 MG/ML
1 INJECTION INTRAMUSCULAR; INTRAVENOUS; SUBCUTANEOUS EVERY 4 HOURS
Qty: 0 | Refills: 0 | Status: DISCONTINUED | OUTPATIENT
Start: 2018-07-02 | End: 2018-07-05

## 2018-07-02 RX ORDER — SODIUM CHLORIDE 9 MG/ML
3 INJECTION INTRAMUSCULAR; INTRAVENOUS; SUBCUTANEOUS ONCE
Qty: 0 | Refills: 0 | Status: COMPLETED | OUTPATIENT
Start: 2018-07-02 | End: 2018-07-02

## 2018-07-02 RX ADMIN — Medication 2.5 MILLIGRAM(S): at 10:54

## 2018-07-02 RX ADMIN — DEXTROSE MONOHYDRATE, SODIUM CHLORIDE, AND POTASSIUM CHLORIDE 75 MILLILITER(S): 50; .745; 4.5 INJECTION, SOLUTION INTRAVENOUS at 20:00

## 2018-07-02 RX ADMIN — MORPHINE SULFATE 2 MILLIGRAM(S): 50 CAPSULE, EXTENDED RELEASE ORAL at 06:53

## 2018-07-02 RX ADMIN — Medication 2.5 MILLIGRAM(S): at 19:00

## 2018-07-02 RX ADMIN — SODIUM CHLORIDE 3 MILLILITER(S): 9 INJECTION INTRAMUSCULAR; INTRAVENOUS; SUBCUTANEOUS at 06:53

## 2018-07-02 RX ADMIN — SODIUM CHLORIDE 125 MILLILITER(S): 9 INJECTION INTRAMUSCULAR; INTRAVENOUS; SUBCUTANEOUS at 02:17

## 2018-07-02 RX ADMIN — HEPARIN SODIUM 1100 UNIT(S)/HR: 5000 INJECTION INTRAVENOUS; SUBCUTANEOUS at 19:37

## 2018-07-02 RX ADMIN — MORPHINE SULFATE 4 MILLIGRAM(S): 50 CAPSULE, EXTENDED RELEASE ORAL at 06:53

## 2018-07-02 RX ADMIN — SODIUM CHLORIDE 125 MILLILITER(S): 9 INJECTION INTRAMUSCULAR; INTRAVENOUS; SUBCUTANEOUS at 11:35

## 2018-07-02 RX ADMIN — ONDANSETRON 4 MILLIGRAM(S): 8 TABLET, FILM COATED ORAL at 02:17

## 2018-07-02 RX ADMIN — MORPHINE SULFATE 2 MILLIGRAM(S): 50 CAPSULE, EXTENDED RELEASE ORAL at 02:17

## 2018-07-02 NOTE — H&P ADULT - HISTORY OF PRESENT ILLNESS
82 yo F with PMH DM, HTN, HLD, Lymphoma on chemotherapy (last treatment 2 weeks ago) presents to  with abdominal pain and distension. History of present illness is translated to english by pt's son at the bedside. As per pt, she reports the pain began approximately 1 day ago after eating her lunch. +Nausea associated with the pain but denies any episodes of vomiting. She denies f/c/SOB/CP at this time. Pt states she has had similar pain before, and admits to being seen in March of this year for a SBO at  which was managed conservatively at the time. Pt also reports she was seen last week for new onset of dizziness, at which time she was found to be in new onset atrial fibrillation. She was discharged on eliquis and cardioverted at [facility], after which she was converted back to NSR. Pt also states she had a MARGIE which showed a normal EF, though she cannot recall what the exact number was. Her Past surgical hx includes h/o Hysterectomy, appendectomy, and cholecystectomy.

## 2018-07-02 NOTE — CONSULT NOTE ADULT - ASSESSMENT
84 yo female with PMH of recent new onset A. fib 6/28/18 s/p MARGIE cardioversion 6/29/18, s/p ILR placement, non hodgkins lymphoma on chemo, NIDDM, CKD, GERD, HTN, HLD admitted with SBO.    #SBO  - management as per primary surgery team  - NPO  - NGT  - pain control  - serial abd exams  - pt is medically optimized if needs surgery    #Paroxysmal A. fib (currently in sinus rhythm)  - continue IV lopressor 2.5mg q6h as pt is NPO  - stop Eliquis and start heparin drip (no bolus) as pt may need surgery  - s/p MARGIE cardioversion on 6/29 with LVEF 60-65%, severe LA dilation, severe MR, mild-mod TR    #Leukocytosis  - likely secondary to SBO  - f/u UA    #DM2  - HbA1c 5.6  - hold metformin    #Non Hodgkins lymphoma  - outpatient follow up with oncology Dr. Polanco    #Anemia - chronic  - Hgb stable  - monitor    #DVT prophylaxis  - will start heparin drip

## 2018-07-02 NOTE — H&P ADULT - ASSESSMENT
82 yo F with lymphoma, on chemotherapy and h/o SBO last seen in 3/2018 presents to  with recurrence of SBO.   SBO- NGT in place, continue CLWS. Serum lactate ordered  Recent onset afib- Pt now in NSR. Place on monitored bed. Eliquis and rate control agents ordered.   Hospitalist consult to f/u medical management  IVF  NPO  DVT ppx- venodynes  hypokalemia- replete IV      d/w Dr. Carballo.

## 2018-07-02 NOTE — CONSULT NOTE ADULT - SUBJECTIVE AND OBJECTIVE BOX
84 yo female with PMH of recent new onset A. fib 6/28/18 s/p MARGIE cardioversion 6/29/18, s/p ILR placement, non hodgkins lymphoma on chemo, NIDDM, CKD, GERD, HTN, HLD who presented to ED with complaint of abdominal pain which began 1 day prior to admission. Pt was seen here at  on 6/28 for new onset A. fib. She was seen by her primary cardio Dr. Mcclelland at [facility] and underwent MARGIE cardioversion on 6/29. She was doing well until 1 day prior to admission where she developed abd pain and nausea after eating lunch. No vomiting. Pt does have a history of SBO in the past. Hospitalist consult called for medical co-management.    7/2: Pt seen and examined. Resting comfortably with no complaints. NGT in place to low suction. No abd pain. No BM.     PMH: as above  PSxH: hysterectomy, appendectomy cholecystectomy  FH: Mother pancreatic CA, father COPD, smoker  SH: denies etoh, tobacco, or drug use  Allergies: penicillin (rash)    ROS: negative unless stated above in HPI    Vital Signs Last 24 Hrs  T(C): 37.2 (02 Jul 2018 11:15), Max: 37.2 (02 Jul 2018 11:15)  T(F): 99 (02 Jul 2018 11:15), Max: 99 (02 Jul 2018 11:15)  HR: 69 (02 Jul 2018 11:15) (69 - 82)  BP: 115/39 (02 Jul 2018 11:15) (107/45 - 148/67)  BP(mean): --  RR: 18 (02 Jul 2018 11:15) (18 - 18)  SpO2: 100% (02 Jul 2018 11:15) (100% - 100%)    PHYSICAL EXAM:      Constitutional: NAD, well-groomed, well-developed  HEENT: PERRLA, EOMI, Normal Hearing  Neck: No LAD, No JVD  Back: Normal spine flexure, No CVA tenderness  Cardiovascular: S1 and S2, RRR, 2/6 systolic murmur  Respiratory: CTAB  Gastrointestinal: hypoactive BS, soft, mild RLQ tenderness to palpation, no guarding or rebound tenderness  Extremities: No peripheral edema  Vascular: 2+ peripheral pulses  Neurological: A/O x 3, no focal deficits  Psychiatric: Normal mood, normal affect  Musculoskeletal: 5/5 strength b/l upper and lower extremities  Skin: No rashes    Labs                              9.1    14.01 )-----------( 99       ( 02 Jul 2018 08:35 )             29.4     02 Jul 2018 08:35    140    |  104    |  6      ----------------------------<  92     3.5     |  29     |  1.01     Ca    8.7        02 Jul 2018 08:35    TPro  6.7    /  Alb  3.4    /  TBili  0.4    /  DBili  x      /  AST  17     /  ALT  12     /  AlkPhos  86     02 Jul 2018 01:56    PT/INR - ( 02 Jul 2018 08:35 )   PT: 15.8 sec;   INR: 1.45 ratio         PTT - ( 02 Jul 2018 01:56 )  PTT:29.9 sec  CAPILLARY BLOOD GLUCOSE      POCT Blood Glucose.: 91 mg/dL (02 Jul 2018 12:34)  POCT Blood Glucose.: 100 mg/dL (02 Jul 2018 09:13)    LIVER FUNCTIONS - ( 02 Jul 2018 01:56 )  Alb: 3.4 g/dL / Pro: 6.7 gm/dL / ALK PHOS: 86 U/L / ALT: 12 U/L / AST: 17 U/L / GGT: x             Hemoglobin A1C, Whole Blood: 5.6 % (07-02 @ 08:35)    < from: CT Abdomen and Pelvis w/ Oral Cont and w/ IV Cont (07.02.18 @ 04:59) >  EXAM:  CT ABDOMEN AND PELVIS OC IC                            PROCEDURE DATE:  07/02/2018          INTERPRETATION:  CLINICAL INFORMATION: Abdominal pain. History of cancer   and small bowel obstruction. Hysterectomy, appendectomy and   cholecystectomy. Diffuse large B-cell lymphoma receiving chemotherapy.    TECHNIQUE: Contrast enhanced CT of the abdomen and pelvis was performed   with coronal and sagittal reformats. 90 cc Omnipaque 350 were   administered intravenously and 10 cc were discarded. No oral contrast was   administered.    COMPARISON: CT abdomen pelvis 3/25/2018.    FINDINGS:    LOWER CHEST: Within normal limits.    HEPATOBILIARY: Cholecystectomy. Fatty liver and hepatomegaly. No biliary   ductal dilation.  PANCREAS: Atrophic.  SPLEEN: Splenomegaly with numerous hypodense splenic lesions, likely   related to history of lymphoma. Trace nonspecific perisplenic fat   stranding  ADRENALS: Within normal limits.    KIDNEYS/URETERS/BLADDER: Bilateral renal atrophy and left upper pole   renal cyst. No hydronephrosis or urinary calculus. Bladder within normal   limits.  REPRODUCTIVE ORGANS: Hysterectomy. Small amount of free pelvic fluid.    BOWEL/PERITONEUM: No pneumoperitoneum. Small bowel obstruction with   transition point inthe right lower quadrant from dilated, fecalized   small bowel to relatively decompressed distal and terminal ileum. Mild   mesenteric edema and interloop fluid. Appendectomy. Colonic   diverticulosis and moderate feces in the proximal to mid colon. Portions   of the gastrointestinal tract are collapsed, limiting evaluation.     VESSELS:  Mild calcific atherosclerosis. No abdominal aortic aneurysm.  LYMPHATICS/RETROPERITONEUM: No lymphadenopathy. No retroperitoneal   hematoma.      SOFT TISSUES: Laparotomy scar.  BONES: Generalized osteopenia and spinal degenerative changes.    IMPRESSION: Moderate to high-grade small bowel obstruction with   transition point in the right lower quadrant. Mild mesenteric edema and   ascites.    < end of copied text >

## 2018-07-02 NOTE — ED PROVIDER NOTE - PROGRESS NOTE DETAILS
results noted.  pt and family told of results and agree with NGT and admit.  case d/w Hilda and request to contact surgery PA to evaluate pt.  case d/w surgery PA and will evaluate pt pt evaluated by PA and will admit to [facility]

## 2018-07-02 NOTE — ED ADULT NURSE NOTE - OBJECTIVE STATEMENT
pt arrives to ED complaining of abdominal pain. pain is described as intermittent, sharp pains. pain started "middle of the day today." pt currently on third cycle of chemo for lymphoma. pt diagnosed with new onset afib two days ago s/p cardioversion. loop recorded currently implanted. sons at bedside. alert and oriented x 4.

## 2018-07-02 NOTE — H&P ADULT - NSHPPHYSICALEXAM_GEN_ALL_CORE
VSS  General: INAD A&Ox3  Lungs: CTA b/l  CV RRR  Abd: Soft, Moderately distended -BS. Gastric bubbles auscultated to confirm placement of NGT.

## 2018-07-02 NOTE — ED ADULT TRIAGE NOTE - CHIEF COMPLAINT QUOTE
pt c./o abdominal pain and vomiting. Pt has been on chemo ( last treatment 2 weeks ago) for lymphoma

## 2018-07-02 NOTE — ED PROVIDER NOTE - OBJECTIVE STATEMENT
82 y/o female in ED c/o epigastric pain x 1 day after eating lunch.   pt states h/o CA on chemo.   pt denies any fever, HA, cp, sob.  positive nausea but no emesis.   tolerating PO.   no sick contacts or recent travel

## 2018-07-03 LAB
ADD ON TEST-SPECIMEN IN LAB: SIGNIFICANT CHANGE UP
ANION GAP SERPL CALC-SCNC: 8 MMOL/L — SIGNIFICANT CHANGE UP (ref 5–17)
APTT BLD: 48.7 SEC — HIGH (ref 27.5–37.4)
APTT BLD: 67.6 SEC — HIGH (ref 27.5–37.4)
APTT BLD: 76.1 SEC — HIGH (ref 27.5–37.4)
BUN SERPL-MCNC: 6 MG/DL — LOW (ref 7–23)
CALCIUM SERPL-MCNC: 8.2 MG/DL — LOW (ref 8.5–10.1)
CHLORIDE SERPL-SCNC: 107 MMOL/L — SIGNIFICANT CHANGE UP (ref 96–108)
CO2 SERPL-SCNC: 29 MMOL/L — SIGNIFICANT CHANGE UP (ref 22–31)
CREAT SERPL-MCNC: 0.88 MG/DL — SIGNIFICANT CHANGE UP (ref 0.5–1.3)
GLUCOSE BLDC GLUCOMTR-MCNC: 128 MG/DL — HIGH (ref 70–99)
GLUCOSE BLDC GLUCOMTR-MCNC: 96 MG/DL — SIGNIFICANT CHANGE UP (ref 70–99)
GLUCOSE BLDC GLUCOMTR-MCNC: 98 MG/DL — SIGNIFICANT CHANGE UP (ref 70–99)
GLUCOSE SERPL-MCNC: 87 MG/DL — SIGNIFICANT CHANGE UP (ref 70–99)
HCT VFR BLD CALC: 27.4 % — LOW (ref 34.5–45)
HCT VFR BLD CALC: 28.7 % — LOW (ref 34.5–45)
HGB BLD-MCNC: 8.6 G/DL — LOW (ref 11.5–15.5)
HGB BLD-MCNC: 8.8 G/DL — LOW (ref 11.5–15.5)
MAGNESIUM SERPL-MCNC: 1.6 MG/DL — SIGNIFICANT CHANGE UP (ref 1.6–2.6)
MCHC RBC-ENTMCNC: 26.7 PG — LOW (ref 27–34)
MCHC RBC-ENTMCNC: 27.8 PG — SIGNIFICANT CHANGE UP (ref 27–34)
MCHC RBC-ENTMCNC: 30.7 GM/DL — LOW (ref 32–36)
MCHC RBC-ENTMCNC: 31.4 GM/DL — LOW (ref 32–36)
MCV RBC AUTO: 87 FL — SIGNIFICANT CHANGE UP (ref 80–100)
MCV RBC AUTO: 88.7 FL — SIGNIFICANT CHANGE UP (ref 80–100)
NRBC # BLD: 0 /100 WBCS — SIGNIFICANT CHANGE UP (ref 0–0)
NRBC # BLD: 0 /100 WBCS — SIGNIFICANT CHANGE UP (ref 0–0)
PLATELET # BLD AUTO: 83 K/UL — LOW (ref 150–400)
PLATELET # BLD AUTO: 92 K/UL — LOW (ref 150–400)
POTASSIUM SERPL-MCNC: 3.2 MMOL/L — LOW (ref 3.5–5.3)
POTASSIUM SERPL-SCNC: 3.2 MMOL/L — LOW (ref 3.5–5.3)
RBC # BLD: 3.09 M/UL — LOW (ref 3.8–5.2)
RBC # BLD: 3.3 M/UL — LOW (ref 3.8–5.2)
RBC # FLD: 20.8 % — HIGH (ref 10.3–14.5)
RBC # FLD: 20.8 % — HIGH (ref 10.3–14.5)
SODIUM SERPL-SCNC: 144 MMOL/L — SIGNIFICANT CHANGE UP (ref 135–145)
WBC # BLD: 11.61 K/UL — HIGH (ref 3.8–10.5)
WBC # BLD: 12.61 K/UL — HIGH (ref 3.8–10.5)
WBC # FLD AUTO: 11.61 K/UL — HIGH (ref 3.8–10.5)
WBC # FLD AUTO: 12.61 K/UL — HIGH (ref 3.8–10.5)

## 2018-07-03 PROCEDURE — 74019 RADEX ABDOMEN 2 VIEWS: CPT | Mod: 26

## 2018-07-03 PROCEDURE — 99232 SBSQ HOSP IP/OBS MODERATE 35: CPT

## 2018-07-03 PROCEDURE — 99223 1ST HOSP IP/OBS HIGH 75: CPT

## 2018-07-03 RX ORDER — POTASSIUM CHLORIDE 20 MEQ
10 PACKET (EA) ORAL
Qty: 0 | Refills: 0 | Status: COMPLETED | OUTPATIENT
Start: 2018-07-03 | End: 2018-07-03

## 2018-07-03 RX ORDER — POLYETHYLENE GLYCOL 3350 17 G/17G
17 POWDER, FOR SOLUTION ORAL
Qty: 0 | Refills: 0 | Status: DISCONTINUED | OUTPATIENT
Start: 2018-07-03 | End: 2018-07-05

## 2018-07-03 RX ORDER — MAGNESIUM SULFATE 500 MG/ML
2 VIAL (ML) INJECTION ONCE
Qty: 0 | Refills: 0 | Status: COMPLETED | OUTPATIENT
Start: 2018-07-03 | End: 2018-07-03

## 2018-07-03 RX ADMIN — Medication 50 GRAM(S): at 17:02

## 2018-07-03 RX ADMIN — POLYETHYLENE GLYCOL 3350 17 GRAM(S): 17 POWDER, FOR SOLUTION ORAL at 18:26

## 2018-07-03 RX ADMIN — Medication 100 MILLIEQUIVALENT(S): at 11:38

## 2018-07-03 RX ADMIN — HEPARIN SODIUM 1200 UNIT(S)/HR: 5000 INJECTION INTRAVENOUS; SUBCUTANEOUS at 03:13

## 2018-07-03 RX ADMIN — HEPARIN SODIUM 2000 UNIT(S): 5000 INJECTION INTRAVENOUS; SUBCUTANEOUS at 03:16

## 2018-07-03 RX ADMIN — HEPARIN SODIUM 1200 UNIT(S)/HR: 5000 INJECTION INTRAVENOUS; SUBCUTANEOUS at 18:15

## 2018-07-03 RX ADMIN — HEPARIN SODIUM 1200 UNIT(S)/HR: 5000 INJECTION INTRAVENOUS; SUBCUTANEOUS at 10:17

## 2018-07-03 RX ADMIN — Medication 100 MILLIEQUIVALENT(S): at 15:03

## 2018-07-03 RX ADMIN — Medication 2.5 MILLIGRAM(S): at 05:21

## 2018-07-03 RX ADMIN — Medication 100 MILLIEQUIVALENT(S): at 17:03

## 2018-07-03 RX ADMIN — Medication 2.5 MILLIGRAM(S): at 18:26

## 2018-07-03 RX ADMIN — DEXTROSE MONOHYDRATE, SODIUM CHLORIDE, AND POTASSIUM CHLORIDE 75 MILLILITER(S): 50; .745; 4.5 INJECTION, SOLUTION INTRAVENOUS at 10:17

## 2018-07-04 LAB
ANION GAP SERPL CALC-SCNC: 4 MMOL/L — LOW (ref 5–17)
APTT BLD: 81.2 SEC — HIGH (ref 27.5–37.4)
BUN SERPL-MCNC: 5 MG/DL — LOW (ref 7–23)
CALCIUM SERPL-MCNC: 8.2 MG/DL — LOW (ref 8.5–10.1)
CHLORIDE SERPL-SCNC: 110 MMOL/L — HIGH (ref 96–108)
CO2 SERPL-SCNC: 31 MMOL/L — SIGNIFICANT CHANGE UP (ref 22–31)
CREAT SERPL-MCNC: 0.9 MG/DL — SIGNIFICANT CHANGE UP (ref 0.5–1.3)
GLUCOSE BLDC GLUCOMTR-MCNC: 112 MG/DL — HIGH (ref 70–99)
GLUCOSE BLDC GLUCOMTR-MCNC: 117 MG/DL — HIGH (ref 70–99)
GLUCOSE BLDC GLUCOMTR-MCNC: 123 MG/DL — HIGH (ref 70–99)
GLUCOSE SERPL-MCNC: 135 MG/DL — HIGH (ref 70–99)
HCT VFR BLD CALC: 27.7 % — LOW (ref 34.5–45)
HGB BLD-MCNC: 8.4 G/DL — LOW (ref 11.5–15.5)
MCHC RBC-ENTMCNC: 27.4 PG — SIGNIFICANT CHANGE UP (ref 27–34)
MCHC RBC-ENTMCNC: 30.3 GM/DL — LOW (ref 32–36)
MCV RBC AUTO: 90.2 FL — SIGNIFICANT CHANGE UP (ref 80–100)
NRBC # BLD: 0 /100 WBCS — SIGNIFICANT CHANGE UP (ref 0–0)
PLATELET # BLD AUTO: 87 K/UL — LOW (ref 150–400)
POTASSIUM SERPL-MCNC: 3.7 MMOL/L — SIGNIFICANT CHANGE UP (ref 3.5–5.3)
POTASSIUM SERPL-SCNC: 3.7 MMOL/L — SIGNIFICANT CHANGE UP (ref 3.5–5.3)
RBC # BLD: 3.07 M/UL — LOW (ref 3.8–5.2)
RBC # FLD: 20.5 % — HIGH (ref 10.3–14.5)
SODIUM SERPL-SCNC: 145 MMOL/L — SIGNIFICANT CHANGE UP (ref 135–145)
WBC # BLD: 9.26 K/UL — SIGNIFICANT CHANGE UP (ref 3.8–10.5)
WBC # FLD AUTO: 9.26 K/UL — SIGNIFICANT CHANGE UP (ref 3.8–10.5)

## 2018-07-04 PROCEDURE — 99232 SBSQ HOSP IP/OBS MODERATE 35: CPT

## 2018-07-04 RX ORDER — METOPROLOL TARTRATE 50 MG
12.5 TABLET ORAL DAILY
Qty: 0 | Refills: 0 | Status: DISCONTINUED | OUTPATIENT
Start: 2018-07-04 | End: 2018-07-05

## 2018-07-04 RX ADMIN — HEPARIN SODIUM 1200 UNIT(S)/HR: 5000 INJECTION INTRAVENOUS; SUBCUTANEOUS at 07:29

## 2018-07-04 RX ADMIN — POLYETHYLENE GLYCOL 3350 17 GRAM(S): 17 POWDER, FOR SOLUTION ORAL at 18:24

## 2018-07-04 RX ADMIN — Medication 2.5 MILLIGRAM(S): at 05:27

## 2018-07-04 RX ADMIN — Medication 12.5 MILLIGRAM(S): at 18:23

## 2018-07-05 ENCOUNTER — TRANSCRIPTION ENCOUNTER (OUTPATIENT)
Age: 83
End: 2018-07-05

## 2018-07-05 VITALS
RESPIRATION RATE: 16 BRPM | HEART RATE: 64 BPM | SYSTOLIC BLOOD PRESSURE: 145 MMHG | DIASTOLIC BLOOD PRESSURE: 47 MMHG | OXYGEN SATURATION: 95 % | TEMPERATURE: 98 F

## 2018-07-05 LAB
ANION GAP SERPL CALC-SCNC: 5 MMOL/L — SIGNIFICANT CHANGE UP (ref 5–17)
APTT BLD: 92.7 SEC — HIGH (ref 27.5–37.4)
BUN SERPL-MCNC: 5 MG/DL — LOW (ref 7–23)
CALCIUM SERPL-MCNC: 8.3 MG/DL — LOW (ref 8.5–10.1)
CHLORIDE SERPL-SCNC: 112 MMOL/L — HIGH (ref 96–108)
CO2 SERPL-SCNC: 28 MMOL/L — SIGNIFICANT CHANGE UP (ref 22–31)
CREAT SERPL-MCNC: 0.8 MG/DL — SIGNIFICANT CHANGE UP (ref 0.5–1.3)
GLUCOSE BLDC GLUCOMTR-MCNC: 114 MG/DL — HIGH (ref 70–99)
GLUCOSE BLDC GLUCOMTR-MCNC: 93 MG/DL — SIGNIFICANT CHANGE UP (ref 70–99)
GLUCOSE SERPL-MCNC: 99 MG/DL — SIGNIFICANT CHANGE UP (ref 70–99)
HCT VFR BLD CALC: 31.5 % — LOW (ref 34.5–45)
HGB BLD-MCNC: 9.5 G/DL — LOW (ref 11.5–15.5)
MCHC RBC-ENTMCNC: 26.6 PG — LOW (ref 27–34)
MCHC RBC-ENTMCNC: 30.2 GM/DL — LOW (ref 32–36)
MCV RBC AUTO: 88.2 FL — SIGNIFICANT CHANGE UP (ref 80–100)
NRBC # BLD: 0 /100 WBCS — SIGNIFICANT CHANGE UP (ref 0–0)
PLATELET # BLD AUTO: 105 K/UL — LOW (ref 150–400)
POTASSIUM SERPL-MCNC: 3.9 MMOL/L — SIGNIFICANT CHANGE UP (ref 3.5–5.3)
POTASSIUM SERPL-SCNC: 3.9 MMOL/L — SIGNIFICANT CHANGE UP (ref 3.5–5.3)
RBC # BLD: 3.57 M/UL — LOW (ref 3.8–5.2)
RBC # FLD: 20.5 % — HIGH (ref 10.3–14.5)
SODIUM SERPL-SCNC: 145 MMOL/L — SIGNIFICANT CHANGE UP (ref 135–145)
WBC # BLD: 8.38 K/UL — SIGNIFICANT CHANGE UP (ref 3.8–10.5)
WBC # FLD AUTO: 8.38 K/UL — SIGNIFICANT CHANGE UP (ref 3.8–10.5)

## 2018-07-05 PROCEDURE — 99239 HOSP IP/OBS DSCHRG MGMT >30: CPT

## 2018-07-05 RX ORDER — APIXABAN 2.5 MG/1
2.5 TABLET, FILM COATED ORAL EVERY 12 HOURS
Qty: 0 | Refills: 0 | Status: DISCONTINUED | OUTPATIENT
Start: 2018-07-05 | End: 2018-07-05

## 2018-07-05 RX ADMIN — Medication 12.5 MILLIGRAM(S): at 05:29

## 2018-07-05 RX ADMIN — POLYETHYLENE GLYCOL 3350 17 GRAM(S): 17 POWDER, FOR SOLUTION ORAL at 05:30

## 2018-07-05 NOTE — DISCHARGE NOTE ADULT - HOME CARE AGENCY
[facility] [phone] requested for visiting nurse assessment.  Requested start of care for: 7/06/2018

## 2018-07-05 NOTE — PROGRESS NOTE ADULT - ASSESSMENT
83 Y old female with multiple medical problems, on chemo for lymphoma, H/o SBOs, presented with abdominal distention nausea, found to have SBO     NPO  IV Hydration  Serial abdominal exam  NGT  trend labs, replace K  DVT prophylaxis  Medicine consult  PT  Will monitor closely ,pt may need surgical intervention if conservative management is not successful , Pt is high risk of deondre operative morbidity and mortality, considering her history and  immunocompromised stratus will try conservative management at least for 72 hours if Pt clinically stays stable.
83 Y old female with multiple medical problems, on chemo for lymphoma, H/o SBOs, presented with abdominal distention nausea, found to have SBO now passing gas    trial of CLD  IV Hydration  Serial abdominal exam  D/C NGT  trend labs, replace K, MG  DVT prophylaxis, on heparin infusion.  Medicine consult  PT  Will add laxative  await more GI function  OOB ambulate  Will monitor closely ,pt may need surgical intervention if conservative management is not successful , Pt is high risk of deondre operative morbidity and mortality, considering her history and  immunocompromised stratus will try conservative management at least for 72 hours if Pt clinically stays stable.
A/P:  Resolved SBO  Diet as tolerated  Monitor bowel function  Medical comorbidities of new onset A. fib 6/28/18 s/p MARGIE cardioversion 6/29/18, s/p ILR placement, non hodgkins lymphoma on chemo, NIDDM, CKD, GERD, HTN, HLD  GI/DVT prophylaxis  Pain control  Serial abd exams benign  Hospitalist on consult for medical management  Pt stable and cleared for d/c from surgical standpoint  Outpatient follow up with pt's private cardiologist and oncologist  Pt aware of and agrees with all of the above
A/P:  Resolving/resolved SBO  Diet advancement as tolerated  Monitor bowel function  Medical comorbidities of new onset A. fib 6/28/18 s/p MARGIE cardioversion 6/29/18, s/p ILR placement, non hodgkins lymphoma on chemo, NIDDM, CKD, GERD, HTN, HLD  GI/DVT prophylaxis  Pain control  Serial abd exams  F/U labs  Hospitalist on consult for medical management  Pt will be monitored for signs of evolution/resolution of pathology and surgical intervention as required and warranted  Pt aware of and agrees with all of the above

## 2018-07-05 NOTE — DISCHARGE NOTE ADULT - CARE PROVIDER_API CALL
Patti Polanco), Hematology; Medical Oncology  [address]  [address]  [place] [address]  Phone: [phone]  Fax: [phone]    Private cardiologist,   Phone: (   )    -  Fax: (   )    -    [facility] Doctor,   Phone: (   )    -  Fax: (   )    -

## 2018-07-05 NOTE — DISCHARGE NOTE ADULT - CARE PLAN
Principal Discharge DX:	SBO (small bowel obstruction)  Goal:	resolved bowel obstruction  Assessment and plan of treatment:	Please seek immediate medical attention for worsening abdominal pain, inability to tolerate diet or pass bowels/flatus, chest pain, shortness of breath, any adverse changes to health. Outpatient follow up with your private cardiologist, medical doctor, and oncologist

## 2018-07-05 NOTE — DISCHARGE NOTE ADULT - NS AS ACTIVITY OBS
Walking-Outdoors allowed/Showering allowed/take all appropriate fall risk precautions/Bathing allowed/Driving allowed/Walking-Indoors allowed/Stairs allowed

## 2018-07-05 NOTE — DISCHARGE NOTE ADULT - ADDITIONAL INSTRUCTIONS
Please seek immediate medical attention for worsening abdominal pain, inability to tolerate diet or pass bowels/flatus, chest pain, shortness of breath, any adverse changes to health. Outpatient follow up with your private cardiologist, medical doctor, and oncologist

## 2018-07-05 NOTE — DISCHARGE NOTE ADULT - PLAN OF CARE
resolved bowel obstruction Please seek immediate medical attention for worsening abdominal pain, inability to tolerate diet or pass bowels/flatus, chest pain, shortness of breath, any adverse changes to health. Outpatient follow up with your private cardiologist, medical doctor, and oncologist

## 2018-07-05 NOTE — DISCHARGE NOTE ADULT - MEDICATION SUMMARY - MEDICATIONS TO TAKE
I will START or STAY ON the medications listed below when I get home from the hospital:    apixaban 2.5 mg oral tablet  -- 1 tab(s) by mouth every 12 hours  -- Indication: For afib    metFORMIN 500 mg oral tablet  -- 1 tab(s) by mouth 2 times a day  -- Indication: For dmII    atorvastatin 10 mg oral tablet  -- 1 tab(s) by mouth once a day  -- Indication: For hyperlipidemia    metoprolol tartrate 25 mg oral tablet  -- 0.5 tab(s) by mouth once a day  -- Indication: For htn    furosemide 20 mg oral tablet  -- 0.5 milligram(s) by mouth once a day  -- Indication: For htn    omeprazole 40 mg oral delayed release capsule  -- 1 cap(s) by mouth once a day  -- Indication: For gerd

## 2018-07-05 NOTE — DISCHARGE NOTE ADULT - HOSPITAL COURSE
Pt presented with small bowel obstruction. Pt demonstrates resolution of small bowel obstruction with non operative management. Pt tolerating diet with normal bowel function

## 2018-07-05 NOTE — PROGRESS NOTE ADULT - SUBJECTIVE AND OBJECTIVE BOX
82 yo female with PMH of recent new onset A. fib 6/28/18 s/p MARGIE cardioversion 6/29/18, s/p ILR placement, non hodgkins lymphoma on chemo, NIDDM, CKD, GERD, HTN, HLD who presented to ED with complaint of abdominal pain which began 1 day prior to admission. Pt was seen here at  on 6/28 for new onset A. fib. She was seen by her primary cardio Dr. Mcclelland at [facility] and underwent MARGIE cardioversion on 6/29. She was doing well until 1 day prior to admission where she developed abd pain and nausea after eating lunch. No vomiting. Pt does have a history of SBO in the past. Hospitalist consult called for medical co-management.      07/03/18: Patient seen and examined. She denies any abd pain.       Vital Signs Last 24 Hrs  T(C): 36.9 (03 Jul 2018 10:15), Max: 37.1 (02 Jul 2018 16:46)  T(F): 98.5 (03 Jul 2018 10:15), Max: 98.8 (02 Jul 2018 16:46)  HR: 70 (03 Jul 2018 10:15) (68 - 76)  BP: 145/50 (03 Jul 2018 10:15) (124/42 - 145/50)  BP(mean): --  RR: 18 (03 Jul 2018 10:15) (18 - 18)  SpO2: 99% (03 Jul 2018 10:15) (94% - 99%)        PHYSICAL EXAM:      Constitutional: NAD, well-groomed, well-developed  HEENT: PERRLA, EOMI, Normal Hearing  Neck: No LAD, No JVD  Back: Normal spine flexure, No CVA tenderness  Cardiovascular: S1 and S2, RRR, 2/6 systolic murmur  Respiratory: CTAB  Gastrointestinal: hypoactive BS, soft, mild RLQ tenderness to palpation, no guarding or rebound tenderness  Extremities: No peripheral edema  Vascular: 2+ peripheral pulses  Neurological: A/O x 3, no focal deficits  Psychiatric: Normal mood, normal affect  Musculoskeletal: 5/5 strength b/l upper and lower extremities  Skin: No rashes.        Labs:                           8.8    12.61 )-----------( 92       ( 03 Jul 2018 05:57 )             28.7     03 Jul 2018 05:57    144    |  107    |  6      ----------------------------<  87     3.2     |  29     |  0.88     Ca    8.2        03 Jul 2018 05:57  Mg     1.6       03 Jul 2018 05:57    TPro  6.7    /  Alb  3.4    /  TBili  0.4    /  DBili  x      /  AST  17     /  ALT  12     /  AlkPhos  86     02 Jul 2018 01:56    LIVER FUNCTIONS - ( 02 Jul 2018 01:56 )  Alb: 3.4 g/dL / Pro: 6.7 gm/dL / ALK PHOS: 86 U/L / ALT: 12 U/L / AST: 17 U/L / GGT: x           PT/INR - ( 02 Jul 2018 08:35 )   PT: 15.8 sec;   INR: 1.45 ratio         PTT - ( 03 Jul 2018 09:14 )  PTT:76.1 sec  CAPILLARY BLOOD GLUCOSE      POCT Blood Glucose.: 98 mg/dL (03 Jul 2018 12:56)  POCT Blood Glucose.: 96 mg/dL (03 Jul 2018 05:37)  POCT Blood Glucose.: 92 mg/dL (02 Jul 2018 22:49)  POCT Blood Glucose.: 90 mg/dL (02 Jul 2018 17:11)                MEDICATIONS:    dextrose 5% + sodium chloride 0.9% with potassium chloride 20 mEq/L 1000 milliLiter(s) (75 mL/Hr) IV Continuous <Continuous>  dextrose 5%. 1000 milliLiter(s) (50 mL/Hr) IV Continuous <Continuous>  dextrose 50% Injectable 12.5 Gram(s) IV Push once  dextrose 50% Injectable 25 Gram(s) IV Push once  dextrose 50% Injectable 25 Gram(s) IV Push once  heparin  Infusion.  Unit(s)/Hr (11 mL/Hr) IV Continuous <Continuous>  insulin lispro (HumaLOG) corrective regimen sliding scale   SubCutaneous three times a day before meals  magnesium sulfate  IVPB 2 Gram(s) IV Intermittent once  metoprolol tartrate Injectable 2.5 milliGRAM(s) IV Push every 12 hours  polyethylene glycol 3350 17 Gram(s) Oral two times a day  potassium chloride  10 mEq/100 mL IVPB 10 milliEquivalent(s) IV Intermittent every 1 hour  sodium chloride 0.9%. 1000 milliLiter(s) (125 mL/Hr) IV Continuous <Continuous>    MEDICATIONS  (PRN):  bisacodyl 5 milliGRAM(s) Oral every 12 hours PRN Constipation  dextrose 40% Gel 15 Gram(s) Oral once PRN Blood Glucose LESS THAN 70 milliGRAM(s)/deciliter  glucagon  Injectable 1 milliGRAM(s) IntraMuscular once PRN Glucose LESS THAN 70 milligrams/deciliter  heparin  Injectable 4500 Unit(s) IV Push every 6 hours PRN For aPTT less than 40  heparin  Injectable 2000 Unit(s) IV Push every 6 hours PRN For aPTT between 40 - 57  HYDROmorphone  Injectable 0.5 milliGRAM(s) IV Push every 4 hours PRN Moderate Pain  HYDROmorphone  Injectable 1 milliGRAM(s) IV Push every 4 hours PRN Severe Pain  ondansetron Injectable 4 milliGRAM(s) IV Push every 6 hours PRN Nausea          Assessment and Recommendation:   · Assessment		  82 yo female with PMH of recent new onset A. fib 6/28/18 s/p MARGIE cardioversion 6/29/18, s/p ILR placement, non hodgkins lymphoma on chemo, NIDDM, CKD, GERD, HTN, HLD admitted with SBO.    #SBO  - management as per primary surgery team  - NPO  - NGT  - pain control  - serial abd exams    #Paroxysmal A. fib (currently in sinus rhythm)  - continue IV lopressor 2.5mg q6h as pt is NPO  - On heparin drip   - s/p MARGIE cardioversion on 6/29 with LVEF 60-65%, severe LA dilation, severe MR, mild-mod TR    #Leukocytosis  - likely secondary to SBO.    # Hypokalemia-replacing  Follow    #DM2  - HbA1c 5.6  - hold metformin    #Non Hodgkins lymphoma  - outpatient follow up with oncology Dr. Polanco    #Anemia - chronic  - Hgb stable  - monitor    #DVT prophylaxis  - On heparin drip
84 yo female with PMH of recent new onset A. fib 6/28/18 s/p MARGIE cardioversion 6/29/18, s/p ILR placement, non hodgkins lymphoma on chemo, NIDDM, CKD, GERD, HTN, HLD who presented to ED with complaint of abdominal pain which began 1 day prior to admission. Pt was seen here at  on 6/28 for new onset A. fib. She was seen by her primary cardio Dr. Mcclelland at [facility] and underwent MARGIE cardioversion on 6/29. She was doing well until 1 day prior to admission where she developed abd pain and nausea after eating lunch. No vomiting. Pt does have a history of SBO in the past. Hospitalist consult called for medical co-management.      07/03/18: Patient seen and examined. She denies any abd pain.   07/04/18: Patient seen and examined. Tolerating clears.   07/05/18: Patient seen and examined. She denies any abd pain.       Vital Signs Last 24 Hrs  T(C): 36.9 (05 Jul 2018 10:38), Max: 37.1 (04 Jul 2018 16:47)  T(F): 98.5 (05 Jul 2018 10:38), Max: 98.8 (04 Jul 2018 16:47)  HR: 64 (05 Jul 2018 10:38) (64 - 72)  BP: 145/47 (05 Jul 2018 10:38) (133/56 - 145/47)  BP(mean): --  RR: 16 (05 Jul 2018 10:38) (16 - 18)  SpO2: 95% (05 Jul 2018 10:38) (95% - 100%)      PHYSICAL EXAM:      Constitutional: NAD, well-groomed, well-developed  HEENT: PERRLA, EOMI, Normal Hearing  Neck: No LAD, No JVD  Back: Normal spine flexure, No CVA tenderness  Cardiovascular: S1 and S2, RRR, 2/6 systolic murmur  Respiratory: CTAB  Gastrointestinal: soft, nontender, bowel sound present.   Extremities: No peripheral edema  Vascular: 2+ peripheral pulses  Neurological: A/O x 3, no focal deficits  Psychiatric: Normal mood, normal affect  Musculoskeletal: 5/5 strength b/l upper and lower extremities  Skin: No rashes.                 Labs:                                                 9.5    8.38  )-----------( 105      ( 05 Jul 2018 07:53 )             31.5     05 Jul 2018 07:53    145    |  112    |  5      ----------------------------<  99     3.9     |  28     |  0.80     Ca    8.3        05 Jul 2018 07:53        PTT - ( 05 Jul 2018 07:53 )  PTT:92.7 sec  CAPILLARY BLOOD GLUCOSE      POCT Blood Glucose.: 93 mg/dL (05 Jul 2018 11:29)  POCT Blood Glucose.: 114 mg/dL (05 Jul 2018 07:48)              MEDICATIONS:          MEDICATIONS  (STANDING):  apixaban 2.5 milliGRAM(s) Oral every 12 hours  dextrose 5% + sodium chloride 0.9% with potassium chloride 20 mEq/L 1000 milliLiter(s) (75 mL/Hr) IV Continuous <Continuous>  dextrose 5%. 1000 milliLiter(s) (50 mL/Hr) IV Continuous <Continuous>  dextrose 50% Injectable 12.5 Gram(s) IV Push once  dextrose 50% Injectable 25 Gram(s) IV Push once  dextrose 50% Injectable 25 Gram(s) IV Push once  insulin lispro (HumaLOG) corrective regimen sliding scale   SubCutaneous three times a day before meals  metoprolol tartrate 12.5 milliGRAM(s) Oral daily  polyethylene glycol 3350 17 Gram(s) Oral two times a day    MEDICATIONS  (PRN):  bisacodyl 5 milliGRAM(s) Oral every 12 hours PRN Constipation  dextrose 40% Gel 15 Gram(s) Oral once PRN Blood Glucose LESS THAN 70 milliGRAM(s)/deciliter  glucagon  Injectable 1 milliGRAM(s) IntraMuscular once PRN Glucose LESS THAN 70 milligrams/deciliter  HYDROmorphone  Injectable 0.5 milliGRAM(s) IV Push every 4 hours PRN Moderate Pain  HYDROmorphone  Injectable 1 milliGRAM(s) IV Push every 4 hours PRN Severe Pain  ondansetron Injectable 4 milliGRAM(s) IV Push every 6 hours PRN Nausea            Assessment and Recommendation:   · Assessment		  84 yo female with PMH of recent new onset A. fib 6/28/18 s/p MARGIE cardioversion 6/29/18, s/p ILR placement, non hodgkins lymphoma on chemo, NIDDM, CKD, GERD, HTN, HLD admitted with SBO.    #SBO: clinically resolving  - management as per primary surgery team  - Advance diet as per surgery  - S/P  NGT  - pain control    #Paroxysmal A. fib (currently in sinus rhythm)  - Changed lopressor to po  - On heparin drip, change to po eliquis  - s/p MARGIE cardioversion on 6/29 with LVEF 60-65%, severe LA dilation, severe MR, mild-mod TR    #Leukocytosis: resolved  - likely secondary to SBO.    # Hypokalemia-replaced and resolved  Follow    #DM2  - HbA1c 5.6  - hold metformin while in patient    #Non Hodgkins lymphoma  - outpatient follow up with oncology Dr. Polanco    #Anemia - chronic  - Hgb stable  - monitor    #DVT prophylaxis
84 yo female with PMH of recent new onset A. fib 6/28/18 s/p MARGIE cardioversion 6/29/18, s/p ILR placement, non hodgkins lymphoma on chemo, NIDDM, CKD, GERD, HTN, HLD who presented to ED with complaint of abdominal pain which began 1 day prior to admission. Pt was seen here at  on 6/28 for new onset A. fib. She was seen by her primary cardio Dr. Mcclelland at [facility] and underwent MARGIE cardioversion on 6/29. She was doing well until 1 day prior to admission where she developed abd pain and nausea after eating lunch. No vomiting. Pt does have a history of SBO in the past. Hospitalist consult called for medical co-management.      07/03/18: Patient seen and examined. She denies any abd pain.   07/04/18: Patient seen and examined. Tolerating clears.       Vital Signs Last 24 Hrs  T(C): 37.2 (04 Jul 2018 11:03), Max: 37.2 (04 Jul 2018 11:03)  T(F): 98.9 (04 Jul 2018 11:03), Max: 98.9 (04 Jul 2018 11:03)  HR: 68 (04 Jul 2018 11:03) (68 - 73)  BP: 136/56 (04 Jul 2018 11:03) (124/60 - 141/57)  BP(mean): --  RR: 18 (04 Jul 2018 11:03) (18 - 18)  SpO2: 97% (04 Jul 2018 11:03) (94% - 97%)      PHYSICAL EXAM:      Constitutional: NAD, well-groomed, well-developed  HEENT: PERRLA, EOMI, Normal Hearing  Neck: No LAD, No JVD  Back: Normal spine flexure, No CVA tenderness  Cardiovascular: S1 and S2, RRR, 2/6 systolic murmur  Respiratory: CTAB  Gastrointestinal: hypoactive BS, soft, mild RLQ tenderness to palpation, no guarding or rebound tenderness  Extremities: No peripheral edema  Vascular: 2+ peripheral pulses  Neurological: A/O x 3, no focal deficits  Psychiatric: Normal mood, normal affect  Musculoskeletal: 5/5 strength b/l upper and lower extremities  Skin: No rashes.        Labs:                                                   8.4    9.26  )-----------( 87       ( 04 Jul 2018 05:16 )             27.7     04 Jul 2018 08:27    145    |  110    |  5      ----------------------------<  135    3.7     |  31     |  0.90     Ca    8.2        04 Jul 2018 08:27  Mg     1.6       03 Jul 2018 05:57        PTT - ( 04 Jul 2018 05:16 )  PTT:81.2 sec  CAPILLARY BLOOD GLUCOSE      POCT Blood Glucose.: 112 mg/dL (04 Jul 2018 12:00)  POCT Blood Glucose.: 117 mg/dL (04 Jul 2018 07:39)  POCT Blood Glucose.: 128 mg/dL (03 Jul 2018 17:01)                      MEDICATIONS:      MEDICATIONS  (STANDING):  dextrose 5% + sodium chloride 0.9% with potassium chloride 20 mEq/L 1000 milliLiter(s) (75 mL/Hr) IV Continuous <Continuous>  dextrose 5%. 1000 milliLiter(s) (50 mL/Hr) IV Continuous <Continuous>  dextrose 50% Injectable 12.5 Gram(s) IV Push once  dextrose 50% Injectable 25 Gram(s) IV Push once  dextrose 50% Injectable 25 Gram(s) IV Push once  heparin  Infusion.  Unit(s)/Hr (11 mL/Hr) IV Continuous <Continuous>  insulin lispro (HumaLOG) corrective regimen sliding scale   SubCutaneous three times a day before meals  metoprolol tartrate Injectable 2.5 milliGRAM(s) IV Push every 12 hours  polyethylene glycol 3350 17 Gram(s) Oral two times a day  sodium chloride 0.9%. 1000 milliLiter(s) (125 mL/Hr) IV Continuous <Continuous>    MEDICATIONS  (PRN):  bisacodyl 5 milliGRAM(s) Oral every 12 hours PRN Constipation  dextrose 40% Gel 15 Gram(s) Oral once PRN Blood Glucose LESS THAN 70 milliGRAM(s)/deciliter  glucagon  Injectable 1 milliGRAM(s) IntraMuscular once PRN Glucose LESS THAN 70 milligrams/deciliter  heparin  Injectable 4500 Unit(s) IV Push every 6 hours PRN For aPTT less than 40  heparin  Injectable 2000 Unit(s) IV Push every 6 hours PRN For aPTT between 40 - 57  HYDROmorphone  Injectable 0.5 milliGRAM(s) IV Push every 4 hours PRN Moderate Pain  HYDROmorphone  Injectable 1 milliGRAM(s) IV Push every 4 hours PRN Severe Pain  ondansetron Injectable 4 milliGRAM(s) IV Push every 6 hours PRN Nausea          Assessment and Recommendation:   · Assessment		  84 yo female with PMH of recent new onset A. fib 6/28/18 s/p MARGIE cardioversion 6/29/18, s/p ILR placement, non hodgkins lymphoma on chemo, NIDDM, CKD, GERD, HTN, HLD admitted with SBO.    #SBO: clinically resolving  - management as per primary surgery team  - Advance diet as per surgery  - S/P  NGT  - pain control    #Paroxysmal A. fib (currently in sinus rhythm)  - Change lopressor to po  - On heparin drip   - s/p MARGIE cardioversion on 6/29 with LVEF 60-65%, severe LA dilation, severe MR, mild-mod TR    #Leukocytosis: resolved  - likely secondary to SBO.    # Hypokalemia-replaced and resolved  Follow    #DM2  - HbA1c 5.6  - hold metformin    #Non Hodgkins lymphoma  - outpatient follow up with oncology Dr. Polanco    #Anemia - chronic  - Hgb stable  - monitor    #DVT prophylaxis  - On heparin drip
CC:Patient is a 83y old  Female who presents with a chief complaint of Abdominal pain (02 Jul 2018 06:52)      Subjective:  Pt seen and examined at bedside with chaperone./son who provided Israeli translation Pt is AAOx3, pt in no acute distress. Pt denied c/o fever, chills, chest pain, SOB, abd pain, N/V/D, extremity pain or dysfunction, hemoptysis, hematemesis, hematuria, hematochexia, headache, diplopia, vertigo, dizzyness. Pt tolerating diet, (+) void, (+) ambulation, (+) bowel function of flatus and bm    ROS:  as above otherwise negative ROS    Vital Signs Last 24 Hrs  T(C): 37.2 (04 Jul 2018 11:03), Max: 37.2 (04 Jul 2018 11:03)  T(F): 98.9 (04 Jul 2018 11:03), Max: 98.9 (04 Jul 2018 11:03)  HR: 68 (04 Jul 2018 11:03) (68 - 73)  BP: 136/56 (04 Jul 2018 11:03) (124/60 - 141/57)  BP(mean): --  RR: 18 (04 Jul 2018 11:03) (18 - 18)  SpO2: 97% (04 Jul 2018 11:03) (94% - 97%)    Labs:                                8.4    9.26  )-----------( 87       ( 04 Jul 2018 05:16 )             27.7     CBC Full  -  ( 04 Jul 2018 05:16 )  WBC Count : 9.26 K/uL  Hemoglobin : 8.4 g/dL  Hematocrit : 27.7 %  Platelet Count - Automated : 87 K/uL  Mean Cell Volume : 90.2 fl  Mean Cell Hemoglobin : 27.4 pg  Mean Cell Hemoglobin Concentration : 30.3 gm/dL  Auto Neutrophil # : x  Auto Lymphocyte # : x  Auto Monocyte # : x  Auto Eosinophil # : x  Auto Basophil # : x  Auto Neutrophil % : x  Auto Lymphocyte % : x  Auto Monocyte % : x  Auto Eosinophil % : x  Auto Basophil % : x    07-04    145  |  110<H>  |  5<L>  ----------------------------<  135<H>  3.7   |  31  |  0.90    Ca    8.2<L>      04 Jul 2018 08:27  Mg     1.6     07-03        PTT - ( 04 Jul 2018 05:16 )  PTT:81.2 sec      Meds:  bisacodyl 5 milliGRAM(s) Oral every 12 hours PRN  dextrose 40% Gel 15 Gram(s) Oral once PRN  dextrose 5% + sodium chloride 0.9% with potassium chloride 20 mEq/L 1000 milliLiter(s) IV Continuous <Continuous>  dextrose 5%. 1000 milliLiter(s) IV Continuous <Continuous>  dextrose 50% Injectable 12.5 Gram(s) IV Push once  dextrose 50% Injectable 25 Gram(s) IV Push once  dextrose 50% Injectable 25 Gram(s) IV Push once  glucagon  Injectable 1 milliGRAM(s) IntraMuscular once PRN  heparin  Infusion.  Unit(s)/Hr IV Continuous <Continuous>  heparin  Injectable 4500 Unit(s) IV Push every 6 hours PRN  heparin  Injectable 2000 Unit(s) IV Push every 6 hours PRN  HYDROmorphone  Injectable 0.5 milliGRAM(s) IV Push every 4 hours PRN  HYDROmorphone  Injectable 1 milliGRAM(s) IV Push every 4 hours PRN  insulin lispro (HumaLOG) corrective regimen sliding scale   SubCutaneous three times a day before meals  metoprolol tartrate Injectable 2.5 milliGRAM(s) IV Push every 12 hours  ondansetron Injectable 4 milliGRAM(s) IV Push every 6 hours PRN  polyethylene glycol 3350 17 Gram(s) Oral two times a day  sodium chloride 0.9%. 1000 milliLiter(s) IV Continuous <Continuous>      Radiology:  < from: Xray Abdomen 2 Views (07.03.18 @ 10:53) >  EXAM:  XR ABDOMEN 2V                            PROCEDURE DATE:  07/03/2018          INTERPRETATION:  Abdomen radiographs             CLINICAL INFORMATION:  Postoperative, no bowel movement  Pain.    TECHNIQUE:  Supine and upright views of the abdomen were obtained.    FINDINGS:   3/26/2018 available for review.    The bowel gas pattern is significant for moderate stool retention. NG   tube is seen within the stomach.  Surgical clips in RIGHT upper quadrant.   No pathologic calcifications are seen.  The osseous structures appear   intact.           IMPRESSION:  moderate stool retention. NG tube is seen within the stomach                CARINE CHURCH M.D., ATTENDING RADIOLOGIST  This document has been electronically signed. Jul  3 2018 12:50PM    < end of copied text >      Physical exam:  Pt is aaox3  Pt in no acute distress  Resp: CTAB  CVS: S1S2(+)  ABD: bowel sounds (+), soft, non distended, no rebound, no guarding, no rigidity, no skin changes to exam. No tenderness to exam  EXT: no calf tenderness or edema to exam b/l, on VTE prophylaxis  Skin: no skin changes to exam
CC:Patient is a 83y old  Female who presents with a chief complaint of Abdominal pain (02 Jul 2018 06:52)      Subjective:  Pt seen and examined at bedside with chaperone/family who provided Kiswahili translation. Pt is AAOx3, pt in no acute distress. Pt denied c/o fever, chills, chest pain, SOB, abd pain, N/V/D, extremity pain or dysfunction, hemoptysis, hematemesis, hematuria, hematochexia, headache, diplopia, vertigo, dizzyness. Pt tolerating diet, (+) void, (+) ambulation, (+) bowel function of flatus and bm    ROS:  negative ROS other than abovementioned    Vital Signs Last 24 Hrs  T(C): 36.9 (05 Jul 2018 10:38), Max: 37.1 (04 Jul 2018 16:47)  T(F): 98.5 (05 Jul 2018 10:38), Max: 98.8 (04 Jul 2018 16:47)  HR: 64 (05 Jul 2018 10:38) (64 - 72)  BP: 145/47 (05 Jul 2018 10:38) (133/56 - 145/47)  BP(mean): --  RR: 16 (05 Jul 2018 10:38) (16 - 18)  SpO2: 95% (05 Jul 2018 10:38) (95% - 100%)    Labs:                                9.5    8.38  )-----------( 105      ( 05 Jul 2018 07:53 )             31.5     CBC Full  -  ( 05 Jul 2018 07:53 )  WBC Count : 8.38 K/uL  Hemoglobin : 9.5 g/dL  Hematocrit : 31.5 %  Platelet Count - Automated : 105 K/uL  Mean Cell Volume : 88.2 fl  Mean Cell Hemoglobin : 26.6 pg  Mean Cell Hemoglobin Concentration : 30.2 gm/dL  Auto Neutrophil # : x  Auto Lymphocyte # : x  Auto Monocyte # : x  Auto Eosinophil # : x  Auto Basophil # : x  Auto Neutrophil % : x  Auto Lymphocyte % : x  Auto Monocyte % : x  Auto Eosinophil % : x  Auto Basophil % : x    07-05    145  |  112<H>  |  5<L>  ----------------------------<  99  3.9   |  28  |  0.80    Ca    8.3<L>      05 Jul 2018 07:53        PTT - ( 05 Jul 2018 07:53 )  PTT:92.7 sec      Meds:  apixaban 2.5 milliGRAM(s) Oral every 12 hours  bisacodyl 5 milliGRAM(s) Oral every 12 hours PRN  dextrose 40% Gel 15 Gram(s) Oral once PRN  dextrose 5% + sodium chloride 0.9% with potassium chloride 20 mEq/L 1000 milliLiter(s) IV Continuous <Continuous>  dextrose 5%. 1000 milliLiter(s) IV Continuous <Continuous>  dextrose 50% Injectable 12.5 Gram(s) IV Push once  dextrose 50% Injectable 25 Gram(s) IV Push once  dextrose 50% Injectable 25 Gram(s) IV Push once  glucagon  Injectable 1 milliGRAM(s) IntraMuscular once PRN  HYDROmorphone  Injectable 0.5 milliGRAM(s) IV Push every 4 hours PRN  HYDROmorphone  Injectable 1 milliGRAM(s) IV Push every 4 hours PRN  insulin lispro (HumaLOG) corrective regimen sliding scale   SubCutaneous three times a day before meals  metoprolol tartrate 12.5 milliGRAM(s) Oral daily  ondansetron Injectable 4 milliGRAM(s) IV Push every 6 hours PRN  polyethylene glycol 3350 17 Gram(s) Oral two times a day      Radiology:      Physical exam:  Pt is aaox3  Pt in no acute distress  Resp: CTAB  CVS: S1S2(+)  ABD: bowel sounds (+), soft, non distended, no rebound, no guarding, no rigidity, no skin changes to exam. No tenderness to exam  EXT: no calf tenderness or edema to exam b/l, on VTE prophylaxis  Skin: no skin changes to exam
Patient is a 83y old  Female who presents with a chief complaint of Abdominal pain (02 Jul 2018 06:52)      HPI:  84 yo F with PMH DM, HTN, HLD, Lymphoma on chemotherapy (last treatment 2 weeks ago) presents to  with abdominal pain and distension. History of present illness is translated to english by pt's son at the bedside. As per pt, she reports the pain began approximately 1 day ago after eating her lunch. +Nausea associated with the pain but denies any episodes of vomiting. She denies f/c/SOB/CP at this time. Pt states she has had similar pain before, and admits to being seen in March of this year for a SBO at  which was managed conservatively at the time. Pt also reports she was seen last week for new onset of dizziness, at which time she was found to be in new onset atrial fibrillation. She was discharged on eliquis and cardioverted at [place], after which she was converted back to NSR. Pt also states she had a MARGIE which showed a normal EF, though she cannot recall what the exact number was. Her Past surgical hx includes h/o Hysterectomy, appendectomy, and cholecystectomy. (02 Jul 2018 06:52)  7/3; Pt seen and examined, NAD, no abdominal pian, no nausea passed gas today, no fever.  ROS:.  [] A ten-point review of systems was otherwise negative except as noted.  Systemic:	[ ] Fever	[ ] Chills	[ ] Night sweats    [ ] Fatigue	[ ] Other  [] Cardiovascular:  [] Pulmonary:  [] Renal/Urologic:  [] Gastrointestinal: abdominal pain, vomiting  [] Metabolic:  [] Neurologic:  [] Hematologic:  [] ENT:  [] Ophthalmologic:  [] Musculoskeletal:    [ ] Due to altered mental status/intubation, subjective information were not able to be obtained from the patient. History was obtained, to the extent possible, from review of the chart and collateral sources of information.    All other system review is negative .  PAST MEDICAL & SURGICAL HISTORY:  CKD (chronic kidney disease) stage 3, GFR 30-59 ml/min  Anemia, unspecified type  GERD (gastroesophageal reflux disease)  High cholesterol  Diabetes  HTN (hypertension)  H/O: hysterectomy  S/P cholecystectomy  S/P appendectomy    FAMILY HISTORY:  No pertinent family history in first degree relatives    Social History:     Alcohol: Denied  Smoking: Denied  Drug Use: Denied        Vital Signs Last 24 Hrs  T(C): 36.9 (03 Jul 2018 10:15), Max: 37.1 (02 Jul 2018 16:46)  T(F): 98.5 (03 Jul 2018 10:15), Max: 98.8 (02 Jul 2018 16:46)  HR: 70 (03 Jul 2018 10:15) (68 - 76)  BP: 145/50 (03 Jul 2018 10:15) (124/42 - 145/50)  BP(mean): --  RR: 18 (03 Jul 2018 10:15) (18 - 18)  SpO2: 99% (03 Jul 2018 10:15) (94% - 99%)    I&O's Summary      PHYSICAL EXAM:  Constitutional: NAD, GCS: 15/15  AOX3  Eyes:  WNL  ENMT:  WNL  Neck:  WNL, non tender  Back: Non tender  Respiratory: CTABL  Cardiovascular:  S1+S2+0  Gastrointestinal: Soft, ND , NT  Genitourinary:  WNL  Extremities: NV intact  Vascular:  Intact  Neurological: No focal neurological deficit,  CN, motor and sensory system grossly intact.  Skin: WNL  Musculoskeletal: WNL  Psychiatric: Grossly WNL        Labs:                          8.8    12.61 )-----------( 92       ( 03 Jul 2018 05:57 )             28.7       07-03    144  |  107  |  6<L>  ----------------------------<  87  3.2<L>   |  29  |  0.88    Ca    8.2<L>      03 Jul 2018 05:57  Mg     1.6     07-03    TPro  6.7  /  Alb  3.4  /  TBili  0.4  /  DBili  x   /  AST  17  /  ALT  12  /  AlkPhos  86  07-02      PT/INR - ( 02 Jul 2018 08:35 )   PT: 15.8 sec;   INR: 1.45 ratio         PTT - ( 03 Jul 2018 09:14 )  PTT:76.1 sec  < from: Xray Abdomen 2 Views (07.03.18 @ 10:53) >    EXAM:  XR ABDOMEN 2V                            PROCEDURE DATE:  07/03/2018          INTERPRETATION:  Abdomen radiographs             CLINICAL INFORMATION:  Postoperative, no bowel movement  Pain.    TECHNIQUE:  Supine and upright views of the abdomen were obtained.    FINDINGS:   3/26/2018 available for review.    The bowel gas pattern is significant for moderate stool retention. NG   tube is seen within the stomach.  Surgical clips in RIGHT upper quadrant.   No pathologic calcifications are seen.  The osseous structures appear   intact.           IMPRESSION:  moderate stool retention. NG tube is seen within the stomach
Patient is a 83y old  Female who presents with a chief complaint of Abdominal pain (02 Jul 2018 06:52)      HPI:  82 yo F with PMH DM, HTN, HLD, Lymphoma on chemotherapy (last treatment 2 weeks ago) presents to  with abdominal pain and distension. History of present illness is translated to english by pt's son at the bedside. As per pt, she reports the pain began approximately 1 day ago after eating her lunch. +Nausea associated with the pain but denies any episodes of vomiting. She denies f/c/SOB/CP at this time. Pt states she has had similar pain before, and admits to being seen in March of this year for a SBO at  which was managed conservatively at the time. Pt also reports she was seen last week for new onset of dizziness, at which time she was found to be in new onset atrial fibrillation. She was discharged on eliquis and cardioverted at [place], after which she was converted back to NSR. Pt also states she had a MARGIE which showed a normal EF, though she cannot recall what the exact number was. Her Past surgical hx includes h/o Hysterectomy, appendectomy, and cholecystectomy. (02 Jul 2018 06:52)  7/2: pt seen and examined, NAD, abdominal pain improved, no nausea, no GI function .  ROS:.  [] A ten-point review of systems was otherwise negative except as noted.  Systemic:	[ ] Fever	[ ] Chills	[ ] Night sweats    [ ] Fatigue	[ ] Other  [] Cardiovascular:  [] Pulmonary:  [] Renal/Urologic:  [] Gastrointestinal: abdominal pain, vomiting  [] Metabolic:  [] Neurologic:  [] Hematologic:  [] ENT:  [] Ophthalmologic:  [] Musculoskeletal:    [ ] Due to altered mental status/intubation, subjective information were not able to be obtained from the patient. History was obtained, to the extent possible, from review of the chart and collateral sources of information.    All other system review is negative .  PAST MEDICAL & SURGICAL HISTORY:  CKD (chronic kidney disease) stage 3, GFR 30-59 ml/min  Anemia, unspecified type  GERD (gastroesophageal reflux disease)  High cholesterol  Diabetes  HTN (hypertension)  H/O: hysterectomy  S/P cholecystectomy  S/P appendectomy    FAMILY HISTORY:  No pertinent family history in first degree relatives    Social History:     Alcohol: Denied  Smoking: Denied  Drug Use: Denied        Vital Signs Last 24 Hrs  T(C): 37.2 (02 Jul 2018 11:15), Max: 37.2 (02 Jul 2018 11:15)  T(F): 99 (02 Jul 2018 11:15), Max: 99 (02 Jul 2018 11:15)  HR: 69 (02 Jul 2018 11:15) (69 - 82)  BP: 115/39 (02 Jul 2018 11:15) (107/45 - 148/67)  BP(mean): --  RR: 18 (02 Jul 2018 11:15) (18 - 18)  SpO2: 100% (02 Jul 2018 11:15) (100% - 100%)    I&O's Summary      PHYSICAL EXAM:  Constitutional: NAD, GCS: 15/15  AOX3  Eyes:  WNL  ENMT:  WNL  Neck:  WNL, non tender  Back: Non tender  Respiratory: CTABL  Cardiovascular:  S1+S2+0  Gastrointestinal: Soft, ND , NT  Genitourinary:  WNL  Extremities: NV intact  Vascular:  Intact  Neurological: No focal neurological deficit,  CN, motor and sensory system grossly intact.  Skin: WNL  Musculoskeletal: WNL  Psychiatric: Grossly WNL        Labs:                          9.1    14.01 )-----------( 99       ( 02 Jul 2018 08:35 )             29.4       07-02    140  |  104  |  6<L>  ----------------------------<  92  3.5   |  29  |  1.01    Ca    8.7      02 Jul 2018 08:35    TPro  6.7  /  Alb  3.4  /  TBili  0.4  /  DBili  x   /  AST  17  /  ALT  12  /  AlkPhos  86  07-02      PT/INR - ( 02 Jul 2018 08:35 )   PT: 15.8 sec;   INR: 1.45 ratio         PTT - ( 02 Jul 2018 01:56 )  PTT:29.9 sec

## 2018-07-05 NOTE — DISCHARGE NOTE ADULT - PATIENT PORTAL LINK FT
You can access the [facility], offered by [facility], by registering with the following website: [email or web address]

## 2018-07-05 NOTE — DISCHARGE NOTE ADULT - PROVIDER TOKENS
TOKSULTANA:'[id number]:[id number]',FREE:[LAST:[Private cardiologist],PHONE:[(   )    -],FAX:[(   )    -]],FREE:[LAST:[[facility] Medical Doctor],PHONE:[(   )    -],FAX:[(   )    -]]

## 2018-07-10 DIAGNOSIS — K21.9 GASTRO-ESOPHAGEAL REFLUX DISEASE WITHOUT ESOPHAGITIS: ICD-10-CM

## 2018-07-10 DIAGNOSIS — R10.13 EPIGASTRIC PAIN: ICD-10-CM

## 2018-07-10 DIAGNOSIS — K56.609 UNSPECIFIED INTESTINAL OBSTRUCTION, UNSPECIFIED AS TO PARTIAL VERSUS COMPLETE OBSTRUCTION: ICD-10-CM

## 2018-07-10 DIAGNOSIS — E87.6 HYPOKALEMIA: ICD-10-CM

## 2018-07-10 DIAGNOSIS — Z79.84 LONG TERM (CURRENT) USE OF ORAL HYPOGLYCEMIC DRUGS: ICD-10-CM

## 2018-07-10 DIAGNOSIS — I12.9 HYPERTENSIVE CHRONIC KIDNEY DISEASE WITH STAGE 1 THROUGH STAGE 4 CHRONIC KIDNEY DISEASE, OR UNSPECIFIED CHRONIC KIDNEY DISEASE: ICD-10-CM

## 2018-07-10 DIAGNOSIS — D72.829 ELEVATED WHITE BLOOD CELL COUNT, UNSPECIFIED: ICD-10-CM

## 2018-07-10 DIAGNOSIS — I48.0 PAROXYSMAL ATRIAL FIBRILLATION: ICD-10-CM

## 2018-07-10 DIAGNOSIS — E78.00 PURE HYPERCHOLESTEROLEMIA, UNSPECIFIED: ICD-10-CM

## 2018-07-10 DIAGNOSIS — Z92.21 PERSONAL HISTORY OF ANTINEOPLASTIC CHEMOTHERAPY: ICD-10-CM

## 2018-07-10 DIAGNOSIS — N18.3 CHRONIC KIDNEY DISEASE, STAGE 3 (MODERATE): ICD-10-CM

## 2018-07-10 DIAGNOSIS — D64.9 ANEMIA, UNSPECIFIED: ICD-10-CM

## 2018-07-10 DIAGNOSIS — Z90.710 ACQUIRED ABSENCE OF BOTH CERVIX AND UTERUS: ICD-10-CM

## 2018-07-10 DIAGNOSIS — E11.22 TYPE 2 DIABETES MELLITUS WITH DIABETIC CHRONIC KIDNEY DISEASE: ICD-10-CM

## 2018-07-10 DIAGNOSIS — Z88.0 ALLERGY STATUS TO PENICILLIN: ICD-10-CM

## 2018-07-10 DIAGNOSIS — C85.90 NON-HODGKIN LYMPHOMA, UNSPECIFIED, UNSPECIFIED SITE: ICD-10-CM

## 2018-07-10 DIAGNOSIS — Z90.49 ACQUIRED ABSENCE OF OTHER SPECIFIED PARTS OF DIGESTIVE TRACT: ICD-10-CM

## 2018-07-21 ENCOUNTER — INPATIENT (INPATIENT)
Facility: HOSPITAL | Age: 83
LOS: 3 days | Discharge: HOME | End: 2018-07-25
Attending: SURGERY | Admitting: SURGERY
Payer: MEDICARE

## 2018-07-21 VITALS — WEIGHT: 126.99 LBS | HEIGHT: 64 IN

## 2018-07-21 DIAGNOSIS — Z90.49 ACQUIRED ABSENCE OF OTHER SPECIFIED PARTS OF DIGESTIVE TRACT: Chronic | ICD-10-CM

## 2018-07-21 DIAGNOSIS — Z90.710 ACQUIRED ABSENCE OF BOTH CERVIX AND UTERUS: Chronic | ICD-10-CM

## 2018-07-21 LAB
ALBUMIN SERPL ELPH-MCNC: 3.1 G/DL — LOW (ref 3.3–5)
ALP SERPL-CCNC: 63 U/L — SIGNIFICANT CHANGE UP (ref 40–120)
ALT FLD-CCNC: 13 U/L — SIGNIFICANT CHANGE UP (ref 12–78)
ANION GAP SERPL CALC-SCNC: 6 MMOL/L — SIGNIFICANT CHANGE UP (ref 5–17)
ANISOCYTOSIS BLD QL: SLIGHT — SIGNIFICANT CHANGE UP
APTT BLD: 27.6 SEC — SIGNIFICANT CHANGE UP (ref 27.5–37.4)
AST SERPL-CCNC: 6 U/L — LOW (ref 15–37)
BASOPHILS # BLD AUTO: 0.01 K/UL — SIGNIFICANT CHANGE UP (ref 0–0.2)
BASOPHILS NFR BLD AUTO: 1 % — SIGNIFICANT CHANGE UP (ref 0–2)
BILIRUB SERPL-MCNC: 0.5 MG/DL — SIGNIFICANT CHANGE UP (ref 0.2–1.2)
BLASTS # FLD: 1 % — HIGH (ref 0–0)
BLD GP AB SCN SERPL QL: SIGNIFICANT CHANGE UP
BUN SERPL-MCNC: 12 MG/DL — SIGNIFICANT CHANGE UP (ref 7–23)
CALCIUM SERPL-MCNC: 8.9 MG/DL — SIGNIFICANT CHANGE UP (ref 8.5–10.1)
CHLORIDE SERPL-SCNC: 100 MMOL/L — SIGNIFICANT CHANGE UP (ref 96–108)
CO2 SERPL-SCNC: 31 MMOL/L — SIGNIFICANT CHANGE UP (ref 22–31)
CREAT SERPL-MCNC: 0.97 MG/DL — SIGNIFICANT CHANGE UP (ref 0.5–1.3)
DACRYOCYTES BLD QL SMEAR: SLIGHT — SIGNIFICANT CHANGE UP
EOSINOPHIL # BLD AUTO: 0.07 K/UL — SIGNIFICANT CHANGE UP (ref 0–0.5)
EOSINOPHIL NFR BLD AUTO: 12 % — HIGH (ref 0–6)
GLUCOSE SERPL-MCNC: 137 MG/DL — HIGH (ref 70–99)
HCT VFR BLD CALC: 27 % — LOW (ref 34.5–45)
HGB BLD-MCNC: 8.8 G/DL — LOW (ref 11.5–15.5)
HYPOCHROMIA BLD QL: SLIGHT — SIGNIFICANT CHANGE UP
INR BLD: 1.38 RATIO — HIGH (ref 0.88–1.16)
LIDOCAIN IGE QN: 43 U/L — LOW (ref 73–393)
LYMPHOCYTES # BLD AUTO: 0.18 K/UL — LOW (ref 1–3.3)
LYMPHOCYTES # BLD AUTO: 31 % — SIGNIFICANT CHANGE UP (ref 13–44)
LYMPHOCYTES # SPEC AUTO: 3 % — HIGH (ref 0–0)
MACROCYTES BLD QL: SLIGHT — SIGNIFICANT CHANGE UP
MANUAL SMEAR VERIFICATION: SIGNIFICANT CHANGE UP
MCHC RBC-ENTMCNC: 28.4 PG — SIGNIFICANT CHANGE UP (ref 27–34)
MCHC RBC-ENTMCNC: 32.6 GM/DL — SIGNIFICANT CHANGE UP (ref 32–36)
MCV RBC AUTO: 87.1 FL — SIGNIFICANT CHANGE UP (ref 80–100)
MICROCYTES BLD QL: SLIGHT — SIGNIFICANT CHANGE UP
MONOCYTES # BLD AUTO: 0.09 K/UL — SIGNIFICANT CHANGE UP (ref 0–0.9)
MONOCYTES NFR BLD AUTO: 15 % — HIGH (ref 2–14)
NEUTROPHILS # BLD AUTO: 0.21 K/UL — LOW (ref 1.8–7.4)
NEUTROPHILS NFR BLD AUTO: 34 % — LOW (ref 43–77)
NEUTS BAND # BLD: 2 % — SIGNIFICANT CHANGE UP (ref 0–8)
NRBC # BLD: 0 /100 — SIGNIFICANT CHANGE UP (ref 0–0)
NRBC # BLD: SIGNIFICANT CHANGE UP /100 WBCS (ref 0–0)
PLAT MORPH BLD: NORMAL — SIGNIFICANT CHANGE UP
PLATELET # BLD AUTO: 50 K/UL — LOW (ref 150–400)
POIKILOCYTOSIS BLD QL AUTO: SLIGHT — SIGNIFICANT CHANGE UP
POTASSIUM SERPL-MCNC: 3.2 MMOL/L — LOW (ref 3.5–5.3)
POTASSIUM SERPL-SCNC: 3.2 MMOL/L — LOW (ref 3.5–5.3)
PROT SERPL-MCNC: 6.7 GM/DL — SIGNIFICANT CHANGE UP (ref 6–8.3)
PROTHROM AB SERPL-ACNC: 15 SEC — HIGH (ref 9.8–12.7)
RBC # BLD: 3.1 M/UL — LOW (ref 3.8–5.2)
RBC # FLD: 18.6 % — HIGH (ref 10.3–14.5)
RBC BLD AUTO: ABNORMAL
SODIUM SERPL-SCNC: 137 MMOL/L — SIGNIFICANT CHANGE UP (ref 135–145)
TYPE + AB SCN PNL BLD: SIGNIFICANT CHANGE UP
VARIANT LYMPHS # BLD: 1 % — SIGNIFICANT CHANGE UP (ref 0–6)
WBC # BLD: 0.58 K/UL — CRITICAL LOW (ref 3.8–10.5)
WBC # FLD AUTO: 0.58 K/UL — CRITICAL LOW (ref 3.8–10.5)

## 2018-07-21 PROCEDURE — 74177 CT ABD & PELVIS W/CONTRAST: CPT | Mod: 26

## 2018-07-21 PROCEDURE — 99285 EMERGENCY DEPT VISIT HI MDM: CPT

## 2018-07-21 PROCEDURE — 99223 1ST HOSP IP/OBS HIGH 75: CPT

## 2018-07-21 RX ORDER — SODIUM CHLORIDE 9 MG/ML
3 INJECTION INTRAMUSCULAR; INTRAVENOUS; SUBCUTANEOUS ONCE
Qty: 0 | Refills: 0 | Status: COMPLETED | OUTPATIENT
Start: 2018-07-21 | End: 2018-07-21

## 2018-07-21 RX ORDER — PANTOPRAZOLE SODIUM 20 MG/1
40 TABLET, DELAYED RELEASE ORAL DAILY
Qty: 0 | Refills: 0 | Status: DISCONTINUED | OUTPATIENT
Start: 2018-07-21 | End: 2018-07-25

## 2018-07-21 RX ORDER — MORPHINE SULFATE 50 MG/1
4 CAPSULE, EXTENDED RELEASE ORAL ONCE
Qty: 0 | Refills: 0 | Status: DISCONTINUED | OUTPATIENT
Start: 2018-07-21 | End: 2018-07-21

## 2018-07-21 RX ORDER — HEPARIN SODIUM 5000 [USP'U]/ML
5000 INJECTION INTRAVENOUS; SUBCUTANEOUS EVERY 12 HOURS
Qty: 0 | Refills: 0 | Status: DISCONTINUED | OUTPATIENT
Start: 2018-07-21 | End: 2018-07-25

## 2018-07-21 RX ORDER — MORPHINE SULFATE 50 MG/1
2 CAPSULE, EXTENDED RELEASE ORAL ONCE
Qty: 0 | Refills: 0 | Status: DISCONTINUED | OUTPATIENT
Start: 2018-07-21 | End: 2018-07-21

## 2018-07-21 RX ORDER — MORPHINE SULFATE 50 MG/1
2 CAPSULE, EXTENDED RELEASE ORAL EVERY 6 HOURS
Qty: 0 | Refills: 0 | Status: DISCONTINUED | OUTPATIENT
Start: 2018-07-21 | End: 2018-07-22

## 2018-07-21 RX ORDER — ONDANSETRON 8 MG/1
4 TABLET, FILM COATED ORAL ONCE
Qty: 0 | Refills: 0 | Status: COMPLETED | OUTPATIENT
Start: 2018-07-21 | End: 2018-07-21

## 2018-07-21 RX ORDER — SODIUM CHLORIDE 9 MG/ML
1000 INJECTION, SOLUTION INTRAVENOUS
Qty: 0 | Refills: 0 | Status: DISCONTINUED | OUTPATIENT
Start: 2018-07-21 | End: 2018-07-24

## 2018-07-21 RX ORDER — ONDANSETRON 8 MG/1
4 TABLET, FILM COATED ORAL ONCE
Qty: 0 | Refills: 0 | Status: COMPLETED | OUTPATIENT
Start: 2018-07-21 | End: 2018-07-22

## 2018-07-21 RX ORDER — ACETAMINOPHEN 500 MG
1000 TABLET ORAL ONCE
Qty: 0 | Refills: 0 | Status: COMPLETED | OUTPATIENT
Start: 2018-07-21 | End: 2018-07-21

## 2018-07-21 RX ORDER — SODIUM CHLORIDE 9 MG/ML
1000 INJECTION INTRAMUSCULAR; INTRAVENOUS; SUBCUTANEOUS ONCE
Qty: 0 | Refills: 0 | Status: COMPLETED | OUTPATIENT
Start: 2018-07-21 | End: 2018-07-21

## 2018-07-21 RX ADMIN — SODIUM CHLORIDE 100 MILLILITER(S): 9 INJECTION, SOLUTION INTRAVENOUS at 23:47

## 2018-07-21 RX ADMIN — ONDANSETRON 4 MILLIGRAM(S): 8 TABLET, FILM COATED ORAL at 19:42

## 2018-07-21 RX ADMIN — SODIUM CHLORIDE 666.67 MILLILITER(S): 9 INJECTION INTRAMUSCULAR; INTRAVENOUS; SUBCUTANEOUS at 19:42

## 2018-07-21 RX ADMIN — SODIUM CHLORIDE 3 MILLILITER(S): 9 INJECTION INTRAMUSCULAR; INTRAVENOUS; SUBCUTANEOUS at 19:46

## 2018-07-21 RX ADMIN — MORPHINE SULFATE 2 MILLIGRAM(S): 50 CAPSULE, EXTENDED RELEASE ORAL at 19:42

## 2018-07-21 RX ADMIN — Medication 400 MILLIGRAM(S): at 21:13

## 2018-07-21 NOTE — ED ADULT NURSE NOTE - OBJECTIVE STATEMENT
patient c/o intermittent moderate to severe abdominal pain and nausea worsening throughout day. one episode of vomiting at home today.

## 2018-07-21 NOTE — ED PROVIDER NOTE - DIAGNOSIS COUNSELING, MDM
conducted a detailed discussion... I had a detailed discussion with the patient and son regarding the historical points, exam findings, and any diagnostic results supporting the admit diagnosis.

## 2018-07-21 NOTE — ED PROVIDER NOTE - NS_ATTENDINGSCRIBE_ED_ALL_ED
Hospital course:  04/09/2018 - Patient underwent procedures without complication, see op notes for details.    Interval History: POD#0. Patient seen in PACU, no acute issues. Pain is adequately controlled with PO and IV medications. Tolerating ice chips/sips without nausea or vomiting. Has not ambulated or voided yet as ayala catheter in place. Denies flatus or BM. No other complaints at this time.      Scheduled Meds:   enoxaparin  40 mg Subcutaneous Daily    ibuprofen  600 mg Oral Q6H    [START ON 4/10/2018] levothyroxine  75 mcg Oral Daily     Continuous Infusions:   dextrose 5 % and 0.9 % NaCl with KCl 20 mEq      lactated ringers       PRN Meds:diphenhydrAMINE, fentaNYL, hydrocodone-acetaminophen 10-325mg, hydrocodone-acetaminophen 5-325mg, HYDROmorphone, meperidine, meperidine, metoclopramide HCl, ondansetron, ondansetron, oxyCODONE, simethicone    Review of patient's allergies indicates:  No Known Allergies    Objective:     Vital Signs (Most Recent):  Temp: 98.6 °F (37 °C) (04/09/18 1735)  Pulse: 82 (04/09/18 1735)  Resp: 16 (04/09/18 1735)  BP: (!) 92/51 (04/09/18 1735)  SpO2: (!) 94 % (04/09/18 1735) Vital Signs (24h Range):  Temp:  [97.5 °F (36.4 °C)-98.6 °F (37 °C)] 98.6 °F (37 °C)  Pulse:  [63-84] 82  Resp:  [16-18] 16  SpO2:  [92 %-95 %] 94 %  BP: ()/(50-64) 92/51     Weight: 68 kg (150 lb)  Body mass index is 25.75 kg/m².  No LMP recorded. Patient is postmenopausal.    I&O (Last 24H):    Intake/Output Summary (Last 24 hours) at 04/09/18 1747  Last data filed at 04/09/18 1700   Gross per 24 hour   Intake             3500 ml   Output              775 ml   Net             2725 ml       Physical Exam:   Constitutional: She is oriented to person, place, and time. She appears well-developed and well-nourished. No distress.       Cardiovascular: Normal rate and regular rhythm.     Pulmonary/Chest: Effort normal. No respiratory distress.        Abdominal: Soft. She exhibits abdominal incision  (bandages in place, c/d/i). She exhibits no distension. There is tenderness (mild, appropriate). There is no rebound and no guarding.   Hypoactive bowel sounds     Genitourinary:   Genitourinary Comments: Patel in place draining clear green urine           Musculoskeletal: Moves all extremeties. She exhibits no edema.   TEDs/SCDs in place       Neurological: She is alert and oriented to person, place, and time.    Skin: Skin is warm and dry.    Psychiatric: She has a normal mood and affect. Her behavior is normal.       Laboratory:  None new   I personally performed the service described in the documentation recorded by the scribe in my presence, and it accurately and completely records my words and actions.

## 2018-07-21 NOTE — ED PROVIDER NOTE - CARE PLAN
Principal Discharge DX:	SBO (small bowel obstruction)  Secondary Diagnosis:	Closed fracture of first lumbar vertebra, unspecified fracture morphology, initial encounter  Secondary Diagnosis:	NHL (non-Hodgkin's lymphoma)

## 2018-07-21 NOTE — ED PROVIDER NOTE - CONSTITUTIONAL, MLM
normal... Elderly female, no respiratory distress +ill appearing, well nourished, awake, alert, oriented to person, place, time/situation and in no apparent distress.

## 2018-07-21 NOTE — ED PROVIDER NOTE - NEUROLOGICAL, MLM
"      Your Child's Health  25Month-Old Visit    Chito Benson  February 1, 2018    Visit Vitals  Temp 98.4 Â°F (36.9 [facility]) (Temporal Artery)   Ht 34.5"" (87.6 cm)   Wt 12.8 kg   HC 47.5 cm (18.7\"")   BMI 16.72 kg/mÂ²     Weight: 28.31 lbs    NUTRITION:  Avoid snacks that cause cavities, including chewy fruit snacks and sugared cereals. A multivitamin with 400 units of vitamin D should be given to all children who drink less than 32 ounces of milk per day. Obesity and being overweight are serious problems in the [facility]. You can help your child avoid these problems by following these guidelines:  Â· Limit TV and video total time to 2 hours per day or less. Â· Do not encourage your children to eat if they tell you they are full. Healthy children will not starve themselves. Â· Do not reward your children for cleaning their plates. If they always leave food, reduce the size of the portions and tell them to ask for more if they are still hungry. Â· Do not allow children to dish out their own portions. They will imitate adults or imitate what they have seen on TV; in both cases, the amount will be too large. This results in increased calories and increased waste. For children above 11years of age and for adults, people eat more when given larger portions. Bottle feeding past one year of age has been associated with an increase in tooth decay. Allowing the child to carry a bottle around and sip it is particularly harmful to teeth. Although most dentists prefer not to see children until 1years of age, ask your dentist's office for advice. Cleaning with a rag or soft brush is recommended by most dentists. DEVELOPMENT:  Children have many physical accomplishments at this age. They can usually walk, climb, drink from a cup, remove clothes, and initiate physical tasks around the house. LANGUAGE:  Most children at this age can say only a few words.  They will begin to acquire language mainly in the months " "ahead. Luann may imitate car or animal noises before she starts to say more ""traditional\"" words. You can assist Luann's language development in many ways:  Â· Name the objects in the immediate area. Â· Name body parts. Â· Give Luann words for actions like \""jump,\"" \""eat,\"" \""drink,\"" and \""run. \""  Â· Talk with Luann and listen to what she is trying to say. Â· Read to [employer]. Even naming the objects in magazine ads will help. TOILET TRAINING:  Readiness for toilet training is sometimes signaled by dryness after naps and the child's demands to be changed as soon as he or she is wet. Some children express an interest as soon as 18 months and it is OK to encourage them after that age. The average age for training in the [facility], however, is closer to 3 years. This means that half of all children train later. The most effective training methods involve praise and small rewards such as raisins or M&M's. When [employer] is older (past 4), special \""grownup\"" pants may be an incentive. If she is totally disinterested or resistant, it may be best to put training off for a few months; however, you should continue to say, \""Soon, when you are big, you will go on the potty\"" whenever you change her. TEMPER TANTRUMS:  These begin about the time a child starts walking and can continue to be a problem until the child is 1-2/1years old or older. The most effective method of dealing with tantrums is to ignore them and to pay more attention and give more praise when the child is behaving well. Time-outs can be effective at this age. CAR SAFETY:  An approved car seat in the back seat of the car is required by [place] law until your child is 3years old and weighs at least 40 pounds.   All infants and toddlers should ride in a rear-facing car seat until 3years of age or until they reach the highest weight or height allowed by their car seats  per recommendation of the American Academy of " Pediatrics. ACCIDENT PREVENTION:  Â· Kitchen:  Keep dangerous items out of reach, including hot liquids, hot foods, and electrical cords on appliances such as irons, toasters, and coffee pots. Â· Upstairs Windows:  Use locks or guards. Â· Bathtubs:  Do not leave Luann alone in the tub. Even babies who have had swimming classes are not safe alone in the tub at this age. Children have even drowned in buckets of water. Â· Water Safety:  Empty any buckets of water. Fence off permanent pools and drain wading pools when not in use. Keep the toilet lid down. SMOKING:  Children exposed to tobacco smoke have more ear infections and pneumonia. Cold symptoms last longer. If you smoke, please quit. If you cannot quit, smoke outside. Do not smoke near [place] city, and do not let others smoke near her. LEAD EXPOSURE:  If there is lead in the house, Luann may be vulnerable. Let us know if any of the following apply:  Â· Your home was built before 1960 and has peeling or chipping or chalking paint. Homes built in older cities at the turn of the last century may have lead pipes. Check to see if any of the above applies to Luann's sitter's home,  , , or any other house where she spends time. Â· You may have other sources of lead in the home, including:  (a) herbal remedies like marlen, cleev, ghasard, kandu, azarcon, pay-loo-ah, or balagoli; (b) antique toys, especially those made of lead or pewter; (c) imported mini blinds; and (d) imported canned goods, especially acidic foods like tomato and citrus. Do not cook with or store foods in utensils made of crystal, pewter, or imported pottery. Â· You have another child or housemate who is being followed or treated for an elevated lead level. Â· Luann lives with an adult whose job or hobby involves lead exposure (soldering, battery manufacture, recycling, casting, stained glass, etc.).   Â· You live near an active lead smelter, a battery recycling plant, or a plant that processes hot metal.    TUBERCULOSIS:  There is such a low rate of tuberculosis in our area that frequent skin tests are no longer recommended. However, certain situations do increase Jefferson's risk, including contact with AIDS patients, nursing home residents, prisoners, immigrants, or homeless persons. Please let us know if Bhaskar Uribe has contacts with such persons, or if she has contact with anyone known to have or suspected of having tuberculosis. SUN EXPOSURE:  If you plan to have Luann outside for more than 30 minutes, please follow the guidelines in our Sun Exposure handout. MEDICATION FOR FEVER OR PAIN:   Acetaminophen liquid (e.g., Tylenol or Tempra) may be given every four hours as needed for pain or fever. Acetaminophen liquid is less concentrated than the infant dropper bottle type. Be sure to check which product CONCENTRATION you are using. INFANT Tylenol/Acetaminophen  Drops (160 mg/5 mL)    Childâs Weight: Dose:  24 - 35 pounds:   160 mg (5.0 mL (1 Teaspoon))    CHILDRENâS Tylenol/Acetaminophen  (160 mg/5 mL)    Childâs Weight: Dose:  24 - 35 pounds:   160 mg (5.0 mL (1 Teaspoon))    INFANT Ibuprofen (50 mg/1.25 ml)  liquid (for example Advil or Motrin) may be given every 6 hours as needed for pain or fever. Childâs Weight: Dose:  24 - 35 pounds:   100 - 150 mg (2.5 - 3.75 mL (1/2 - 3/4 Teaspoon))    Children's Ibuprofen (100 mg/5 mL) liquid (for example, Advil or Motrin) may be given every 6 hours as needed for pain or fever. Childâs Weight: Dose:  24 - 35 pounds:   100  - 150 mg (5.0 - 7.5 mL(1 - 1 1/2 Teaspoons))    If Jefferson is outside this weight range, call your pediatrician's office for advice.         Most Recent Immunizations   Administered Date(s) Administered   â¢ DTaP/HIB/IPV 10/27/2017   â¢ Hep B, adolescent or pediatric 05/05/2017   â¢ Influenza, injectable, quadrivalent, preservative free, pediatric 10/27/2017   â¢ Influenza, injectable, quadrivalent, preservative-free 03/08/2017   â¢ MMR 07/27/2017   â¢ Pneumococcal Conjugate 13 valent 10/27/2017   â¢ Rotavirus - pentavalent 02/02/2017   â¢ Varicella 07/27/2017       If Bee Vora develops any of the following reactions within 72 hours after an immunization, notify your pediatrician by calling the pediatric phone nurse:  Â· A temperature of 105 degrees or above. Â· More than 3 hours of continuous crying. Â· A shrill, high-pitched cry. Â· A pale, limp spell. Â· A seizure or fainting spell. In this case, you should call 911 or go immediately to the emergency room. NEXT VISIT:  3YEARS OF AGE    Thank you for entrusting your care to T[facility] Alert and oriented, no focal deficits, no motor or sensory deficits.

## 2018-07-21 NOTE — ED PROVIDER NOTE - SKIN, MLM
Skin normal color for race, warm, dry and intact. No evidence of rash.questionable tactile warmth Skin normal color for race, warm, dry and intact. No evidence of rash. Questionable tactile warmth

## 2018-07-21 NOTE — ED PROVIDER NOTE - SECONDARY DIAGNOSIS.
Closed fracture of first lumbar vertebra, unspecified fracture morphology, initial encounter NHL (non-Hodgkin's lymphoma)

## 2018-07-21 NOTE — ED PROVIDER NOTE - OBJECTIVE STATEMENT
82 y/o female with a PMHx of non-Hodgkin's lymphoma on chemo (last chemo x2 weeks), HTN, chronic kidney dz, Afib (new onset 8/28/16), inversion MARGIE  presents to the ED c/o intermittent, ocassionally severe abd pain described as squeezing and cramping. +emesis self induced for sym relief without relief. No flatulence passed today, however has had 2 BM. Was previously hospitalized for SBO resolved with conservative management. Recurrent SBO. As per son, pt's pain is the same pain as when she was diagnosed with SBO. Denies CP, SOB. On Eliquis. PCP Dr. Elaine Oncologist- Dr. Lopez. Allergic to PCN    s/p cholesystectomy, appendectomy 82 y/o female with a PMHx of non-Hodgkin's lymphoma on chemo (last chemo x2 weeks), HTN, chronic kidney dz, appendectomy, s/p cholecystectomy, Afib (new onset 8/28/16), inversion MARGIE  presents to the ED c/o intermittent, occasionally severe abd pain described as squeezing and cramping x 5 hours. +emesis self induced for sym relief without relief. No flatulence passed today, however has had 2 BM. Was previously hospitalized for SBO resolved with conservative management. Recurrent SBO. As per son, pt's pain is the same pain as when she was diagnosed with SBO. Denies CP, SOB. On Eliquis. PCP Dr. Elaine Oncologist- Dr. Lopez. Allergic to PCN    s/p cholesystectomy, appendectomy

## 2018-07-21 NOTE — ED PROVIDER NOTE - MUSCULOSKELETAL, MLM
Spine appears normal, range of motion is not limited, no muscle or joint tenderness. ESCALERA x4. No focal deficits, or swelling.

## 2018-07-21 NOTE — ED ADULT NURSE REASSESSMENT NOTE - NS ED NURSE REASSESS COMMENT FT1
patient resting in bed. alert and oriented x 4, respirations even and unlabored, iv started; labs collected and sent. resulted. patient's son refusing blood cultures to be drawn. patient medicated for pain and nausea. reports intermittent moderate-severe abdominal pain worsening throughout day with nausea. one episode of vomiting at home. pmh of small bowel obstruction, patient's son states daughter says pain feels similar to small bowel obstruction in past. iv fluids infusing. awaiting ct scan. son at bedside. will continue to monitor.
18 Frisian nasogastric tube inserted into right nostril and connected to low intermittent suction. draining clear brown gastric contents. patient tolerated procedure without difficulty, denies pain or discomfort at this time.

## 2018-07-22 DIAGNOSIS — K56.609 UNSPECIFIED INTESTINAL OBSTRUCTION, UNSPECIFIED AS TO PARTIAL VERSUS COMPLETE OBSTRUCTION: ICD-10-CM

## 2018-07-22 DIAGNOSIS — D70.1 AGRANULOCYTOSIS SECONDARY TO CANCER CHEMOTHERAPY: ICD-10-CM

## 2018-07-22 DIAGNOSIS — D64.9 ANEMIA, UNSPECIFIED: ICD-10-CM

## 2018-07-22 PROBLEM — N18.3 CHRONIC KIDNEY DISEASE, STAGE 3 (MODERATE): Chronic | Status: ACTIVE | Noted: 2017-06-23

## 2018-07-22 LAB
ABO RH CONFIRMATION: SIGNIFICANT CHANGE UP
ADD ON TEST-SPECIMEN IN LAB: SIGNIFICANT CHANGE UP
ALBUMIN SERPL ELPH-MCNC: 2.4 G/DL — LOW (ref 3.3–5)
ALP SERPL-CCNC: 52 U/L — SIGNIFICANT CHANGE UP (ref 40–120)
ALT FLD-CCNC: 11 U/L — LOW (ref 12–78)
ANION GAP SERPL CALC-SCNC: 6 MMOL/L — SIGNIFICANT CHANGE UP (ref 5–17)
APPEARANCE UR: CLEAR — SIGNIFICANT CHANGE UP
AST SERPL-CCNC: 4 U/L — LOW (ref 15–37)
BACTERIA # UR AUTO: ABNORMAL
BILIRUB SERPL-MCNC: 0.4 MG/DL — SIGNIFICANT CHANGE UP (ref 0.2–1.2)
BILIRUB UR-MCNC: NEGATIVE — SIGNIFICANT CHANGE UP
BLD GP AB SCN SERPL QL: SIGNIFICANT CHANGE UP
BUN SERPL-MCNC: 9 MG/DL — SIGNIFICANT CHANGE UP (ref 7–23)
CALCIUM SERPL-MCNC: 7.9 MG/DL — LOW (ref 8.5–10.1)
CHLORIDE SERPL-SCNC: 104 MMOL/L — SIGNIFICANT CHANGE UP (ref 96–108)
CK SERPL-CCNC: 14 U/L — LOW (ref 26–192)
CO2 SERPL-SCNC: 30 MMOL/L — SIGNIFICANT CHANGE UP (ref 22–31)
COLOR SPEC: YELLOW — SIGNIFICANT CHANGE UP
CREAT SERPL-MCNC: 0.84 MG/DL — SIGNIFICANT CHANGE UP (ref 0.5–1.3)
DIFF PNL FLD: ABNORMAL
EPI CELLS # UR: SIGNIFICANT CHANGE UP
GLUCOSE BLDC GLUCOMTR-MCNC: 118 MG/DL — HIGH (ref 70–99)
GLUCOSE BLDC GLUCOMTR-MCNC: 130 MG/DL — HIGH (ref 70–99)
GLUCOSE BLDC GLUCOMTR-MCNC: 156 MG/DL — HIGH (ref 70–99)
GLUCOSE SERPL-MCNC: 112 MG/DL — HIGH (ref 70–99)
GLUCOSE UR QL: NEGATIVE MG/DL — SIGNIFICANT CHANGE UP
HCT VFR BLD CALC: 24 % — LOW (ref 34.5–45)
HGB BLD-MCNC: 7.7 G/DL — LOW (ref 11.5–15.5)
KETONES UR-MCNC: NEGATIVE — SIGNIFICANT CHANGE UP
LACTATE SERPL-SCNC: 0.9 MMOL/L — SIGNIFICANT CHANGE UP (ref 0.7–2)
LACTATE SERPL-SCNC: 1.2 MMOL/L — SIGNIFICANT CHANGE UP (ref 0.7–2)
LACTATE SERPL-SCNC: 2.1 MMOL/L — HIGH (ref 0.7–2)
LEUKOCYTE ESTERASE UR-ACNC: ABNORMAL
MCHC RBC-ENTMCNC: 28.6 PG — SIGNIFICANT CHANGE UP (ref 27–34)
MCHC RBC-ENTMCNC: 32.1 GM/DL — SIGNIFICANT CHANGE UP (ref 32–36)
MCV RBC AUTO: 89.2 FL — SIGNIFICANT CHANGE UP (ref 80–100)
NITRITE UR-MCNC: NEGATIVE — SIGNIFICANT CHANGE UP
NRBC # BLD: 0 /100 WBCS — SIGNIFICANT CHANGE UP (ref 0–0)
NT-PROBNP SERPL-SCNC: 1616 PG/ML — HIGH (ref 0–450)
PH UR: 7 — SIGNIFICANT CHANGE UP (ref 5–8)
PLATELET # BLD AUTO: 49 K/UL — LOW (ref 150–400)
POTASSIUM SERPL-MCNC: 3.2 MMOL/L — LOW (ref 3.5–5.3)
POTASSIUM SERPL-SCNC: 3.2 MMOL/L — LOW (ref 3.5–5.3)
PROT SERPL-MCNC: 5.5 GM/DL — LOW (ref 6–8.3)
PROT UR-MCNC: 30 MG/DL
RBC # BLD: 2.69 M/UL — LOW (ref 3.8–5.2)
RBC # FLD: 18.5 % — HIGH (ref 10.3–14.5)
RBC CASTS # UR COMP ASSIST: ABNORMAL /HPF (ref 0–4)
SODIUM SERPL-SCNC: 140 MMOL/L — SIGNIFICANT CHANGE UP (ref 135–145)
SP GR SPEC: 1 — LOW (ref 1.01–1.02)
TROPONIN I SERPL-MCNC: <0.015 NG/ML — SIGNIFICANT CHANGE UP (ref 0.01–0.04)
TYPE + AB SCN PNL BLD: SIGNIFICANT CHANGE UP
UROBILINOGEN FLD QL: 1 MG/DL
WBC # BLD: 0.67 K/UL — CRITICAL LOW (ref 3.8–10.5)
WBC # FLD AUTO: 0.67 K/UL — CRITICAL LOW (ref 3.8–10.5)
WBC UR QL: SIGNIFICANT CHANGE UP

## 2018-07-22 PROCEDURE — 71045 X-RAY EXAM CHEST 1 VIEW: CPT | Mod: 26

## 2018-07-22 PROCEDURE — 74018 RADEX ABDOMEN 1 VIEW: CPT | Mod: 26

## 2018-07-22 PROCEDURE — 93010 ELECTROCARDIOGRAM REPORT: CPT

## 2018-07-22 PROCEDURE — 74018 RADEX ABDOMEN 1 VIEW: CPT | Mod: 26,77

## 2018-07-22 PROCEDURE — 99232 SBSQ HOSP IP/OBS MODERATE 35: CPT

## 2018-07-22 RX ORDER — MORPHINE SULFATE 50 MG/1
2 CAPSULE, EXTENDED RELEASE ORAL ONCE
Qty: 0 | Refills: 0 | Status: DISCONTINUED | OUTPATIENT
Start: 2018-07-22 | End: 2018-07-22

## 2018-07-22 RX ORDER — ACETAMINOPHEN 500 MG
650 TABLET ORAL ONCE
Qty: 0 | Refills: 0 | Status: COMPLETED | OUTPATIENT
Start: 2018-07-22 | End: 2018-07-22

## 2018-07-22 RX ORDER — DEXTROSE 50 % IN WATER 50 %
25 SYRINGE (ML) INTRAVENOUS ONCE
Qty: 0 | Refills: 0 | Status: DISCONTINUED | OUTPATIENT
Start: 2018-07-22 | End: 2018-07-25

## 2018-07-22 RX ORDER — DEXTROSE 50 % IN WATER 50 %
15 SYRINGE (ML) INTRAVENOUS ONCE
Qty: 0 | Refills: 0 | Status: DISCONTINUED | OUTPATIENT
Start: 2018-07-22 | End: 2018-07-25

## 2018-07-22 RX ORDER — SODIUM CHLORIDE 9 MG/ML
500 INJECTION INTRAMUSCULAR; INTRAVENOUS; SUBCUTANEOUS ONCE
Qty: 0 | Refills: 0 | Status: COMPLETED | OUTPATIENT
Start: 2018-07-22 | End: 2018-07-22

## 2018-07-22 RX ORDER — MORPHINE SULFATE 50 MG/1
3 CAPSULE, EXTENDED RELEASE ORAL ONCE
Qty: 0 | Refills: 0 | Status: DISCONTINUED | OUTPATIENT
Start: 2018-07-22 | End: 2018-07-22

## 2018-07-22 RX ORDER — DEXTROSE 50 % IN WATER 50 %
12.5 SYRINGE (ML) INTRAVENOUS ONCE
Qty: 0 | Refills: 0 | Status: DISCONTINUED | OUTPATIENT
Start: 2018-07-22 | End: 2018-07-25

## 2018-07-22 RX ORDER — SODIUM CHLORIDE 9 MG/ML
1000 INJECTION, SOLUTION INTRAVENOUS
Qty: 0 | Refills: 0 | Status: DISCONTINUED | OUTPATIENT
Start: 2018-07-22 | End: 2018-07-25

## 2018-07-22 RX ORDER — FILGRASTIM 480MCG/1.6
300 VIAL (ML) INJECTION DAILY
Qty: 0 | Refills: 0 | Status: DISCONTINUED | OUTPATIENT
Start: 2018-07-22 | End: 2018-07-24

## 2018-07-22 RX ORDER — LIDOCAINE 4 G/100G
2 CREAM TOPICAL DAILY
Qty: 0 | Refills: 0 | Status: DISCONTINUED | OUTPATIENT
Start: 2018-07-22 | End: 2018-07-25

## 2018-07-22 RX ORDER — INSULIN LISPRO 100/ML
VIAL (ML) SUBCUTANEOUS
Qty: 0 | Refills: 0 | Status: DISCONTINUED | OUTPATIENT
Start: 2018-07-22 | End: 2018-07-25

## 2018-07-22 RX ORDER — CEFEPIME 1 G/1
1000 INJECTION, POWDER, FOR SOLUTION INTRAMUSCULAR; INTRAVENOUS EVERY 12 HOURS
Qty: 0 | Refills: 0 | Status: DISCONTINUED | OUTPATIENT
Start: 2018-07-23 | End: 2018-07-25

## 2018-07-22 RX ORDER — METFORMIN HYDROCHLORIDE 850 MG/1
1 TABLET ORAL
Qty: 0 | Refills: 0 | COMMUNITY

## 2018-07-22 RX ORDER — GLUCAGON INJECTION, SOLUTION 0.5 MG/.1ML
1 INJECTION, SOLUTION SUBCUTANEOUS ONCE
Qty: 0 | Refills: 0 | Status: DISCONTINUED | OUTPATIENT
Start: 2018-07-22 | End: 2018-07-25

## 2018-07-22 RX ORDER — MORPHINE SULFATE 50 MG/1
2 CAPSULE, EXTENDED RELEASE ORAL EVERY 4 HOURS
Qty: 0 | Refills: 0 | Status: DISCONTINUED | OUTPATIENT
Start: 2018-07-22 | End: 2018-07-22

## 2018-07-22 RX ORDER — MORPHINE SULFATE 50 MG/1
2 CAPSULE, EXTENDED RELEASE ORAL EVERY 6 HOURS
Qty: 0 | Refills: 0 | Status: DISCONTINUED | OUTPATIENT
Start: 2018-07-22 | End: 2018-07-25

## 2018-07-22 RX ORDER — CEFEPIME 1 G/1
INJECTION, POWDER, FOR SOLUTION INTRAMUSCULAR; INTRAVENOUS
Qty: 0 | Refills: 0 | Status: DISCONTINUED | OUTPATIENT
Start: 2018-07-22 | End: 2018-07-25

## 2018-07-22 RX ORDER — MORPHINE SULFATE 50 MG/1
4 CAPSULE, EXTENDED RELEASE ORAL ONCE
Qty: 0 | Refills: 0 | Status: DISCONTINUED | OUTPATIENT
Start: 2018-07-22 | End: 2018-07-22

## 2018-07-22 RX ORDER — CEFEPIME 1 G/1
1000 INJECTION, POWDER, FOR SOLUTION INTRAMUSCULAR; INTRAVENOUS ONCE
Qty: 0 | Refills: 0 | Status: COMPLETED | OUTPATIENT
Start: 2018-07-22 | End: 2018-07-22

## 2018-07-22 RX ORDER — POTASSIUM CHLORIDE 20 MEQ
10 PACKET (EA) ORAL
Qty: 0 | Refills: 0 | Status: COMPLETED | OUTPATIENT
Start: 2018-07-22 | End: 2018-07-22

## 2018-07-22 RX ORDER — CEFEPIME 1 G/1
INJECTION, POWDER, FOR SOLUTION INTRAMUSCULAR; INTRAVENOUS
Qty: 0 | Refills: 0 | Status: DISCONTINUED | OUTPATIENT
Start: 2018-07-22 | End: 2018-07-22

## 2018-07-22 RX ADMIN — PANTOPRAZOLE SODIUM 40 MILLIGRAM(S): 20 TABLET, DELAYED RELEASE ORAL at 12:22

## 2018-07-22 RX ADMIN — LIDOCAINE 2 PATCH: 4 CREAM TOPICAL at 12:22

## 2018-07-22 RX ADMIN — MORPHINE SULFATE 2 MILLIGRAM(S): 50 CAPSULE, EXTENDED RELEASE ORAL at 15:00

## 2018-07-22 RX ADMIN — SODIUM CHLORIDE 500 MILLILITER(S): 9 INJECTION INTRAMUSCULAR; INTRAVENOUS; SUBCUTANEOUS at 00:39

## 2018-07-22 RX ADMIN — MORPHINE SULFATE 2 MILLIGRAM(S): 50 CAPSULE, EXTENDED RELEASE ORAL at 14:50

## 2018-07-22 RX ADMIN — SODIUM CHLORIDE 100 MILLILITER(S): 9 INJECTION, SOLUTION INTRAVENOUS at 21:40

## 2018-07-22 RX ADMIN — ONDANSETRON 4 MILLIGRAM(S): 8 TABLET, FILM COATED ORAL at 05:42

## 2018-07-22 RX ADMIN — SODIUM CHLORIDE 100 MILLILITER(S): 9 INJECTION, SOLUTION INTRAVENOUS at 10:35

## 2018-07-22 RX ADMIN — MORPHINE SULFATE 2 MILLIGRAM(S): 50 CAPSULE, EXTENDED RELEASE ORAL at 04:26

## 2018-07-22 RX ADMIN — CEFEPIME 1000 MILLIGRAM(S): 1 INJECTION, POWDER, FOR SOLUTION INTRAMUSCULAR; INTRAVENOUS at 15:58

## 2018-07-22 RX ADMIN — Medication 300 MICROGRAM(S): at 13:30

## 2018-07-22 RX ADMIN — MORPHINE SULFATE 2 MILLIGRAM(S): 50 CAPSULE, EXTENDED RELEASE ORAL at 02:30

## 2018-07-22 RX ADMIN — MORPHINE SULFATE 4 MILLIGRAM(S): 50 CAPSULE, EXTENDED RELEASE ORAL at 06:14

## 2018-07-22 RX ADMIN — Medication 100 MILLIEQUIVALENT(S): at 13:44

## 2018-07-22 RX ADMIN — Medication 100 MILLIEQUIVALENT(S): at 15:58

## 2018-07-22 RX ADMIN — Medication 650 MILLIGRAM(S): at 20:25

## 2018-07-22 RX ADMIN — SODIUM CHLORIDE 1000 MILLILITER(S): 9 INJECTION INTRAMUSCULAR; INTRAVENOUS; SUBCUTANEOUS at 02:12

## 2018-07-22 RX ADMIN — Medication 100 MILLIEQUIVALENT(S): at 11:49

## 2018-07-22 NOTE — H&P ADULT - HISTORY OF PRESENT ILLNESS
83 Y old female with multiple medical problems, on chemo fro lymphoma, multiple SBOs in the past presented with few hours of crampy abdominal pian, she is nauseous and had 2 bowel movements today but not passing gas. She induced vomiting once. No fever no dysuria. Last chemo 2 weeks ago. Son at bedside who translated. Seems depressed, generalized weakness does not want surgical intervention or NGT.

## 2018-07-22 NOTE — CONSULT NOTE ADULT - ASSESSMENT
Assessment/Plan:    * NH Lymphoma on chemo [facility] s/p 4 th cycle 1.5 week ago   * Pancytopenia -  Neutropenia , severe anemia, thrombocytopenia   - oncology consult for need of Neupogen,  epogen   - c/w Levofloxacin for infection prophylactics  - given neutropenia - pan cultures, neutropenia precaution  - check iron studies, B12 , folate    * Abdominal pain due to SBO   - as per surgery   - NPO, NGT suction  - off AC , on heparin sc as per surgery    * A fib on Eliquis, with MV regurgitation, severe as per son   - HR well control, may need IV BB if HR > 110  - restart amiodarone 200 once can tolerate PO  - off Eliquis   - CHADVASC score 3  - no need for bridging  - may need cardiac clearance prior surgery   - restart AC as soon as ok with surgery   - hold lasix  ( no h/o HF)     * Anemia acute on chronic s/p chemo  - monitor  - consider transfusion if <7   - oncology evaluation    * GERD  - c/w ppi     * Hypokalemia  - replace, check Mg in am      DVT PPX: heparin    Advance Directive: Full code     Disposition: Thank you for consult, will follow with you.     Time Span: 70 min Assessment/Plan:    * NH Lymphoma on chemo R[facility]OP s/p 4 th cycle 1.5 week ago   * Pancytopenia -  Neutropenia , severe anemia, thrombocytopenia   - oncology consult for need of Neupogen,  epogen   - c/w Levofloxacin for infection prophylactics  - given neutropenia - pan cultures, neutropenia precaution  - check iron studies, B12 , folate    * Abdominal pain due to SBO   - as per surgery   - NPO, NGT suction  - off AC , on heparin sc as per surgery    * A fib on Eliquis, with MV regurgitation, severe as per son   - HR well control, may need IV BB if HR > 110  - restart amiodarone 200 once can tolerate PO  - off Eliquis   - CHADVASC score 3  - no need for bridging  - may need cardiac clearance prior surgery   - restart AC as soon as ok with surgery   - hold lasix  ( no h/o HF)     * Anemia acute on chronic s/p chemo  - monitor  - consider transfusion if <7   - oncology evaluation    * Lower abdominal pain due to acute mild L1 compression  - pain management - lidoderm, tylenol  - PT , consider ortho consult  - add vit D once tolerates PO   - osteoporosis work-up and management as o/p     * GERD  - c/w ppi     * Hypokalemia  - replace, check Mg in am      DVT PPX: heparin    Advance Directive: Full code     Disposition: Thank you for consult, will follow with you.     Time Span: 70 min

## 2018-07-22 NOTE — CONSULT NOTE ADULT - PROBLEM SELECTOR RECOMMENDATION 3
likely multifactorial  shall send workup  rec:  transfuse PRN for symptoms or Hb <7  procrit not likley to be effective immediatedly

## 2018-07-22 NOTE — CONSULT NOTE ADULT - SUBJECTIVE AND OBJECTIVE BOX
Patient is a 83y old  Female who presents with a chief complaint of Abdominal pain, unable to pass gas. (2018 00:25)    HPI:  83 Y old female with multiple medical problems, on chemo for lymphoma s/p 5 cycles of R CHOP last chemo completed , multiple SBOs in the past presented with few hours of crampy abdominal pain on  with associated vomiting, no gas, 2 bms. Here noted with pancytopenia, no fever, Ct abd/pelvis with SBO, xray with LLL possible PNA was eval by surgery and NGT placed. Reports abd pain and intermittent cough.      PMH: as above  PSH: as above  Meds: per reconciliation sheet, noted below  MEDICATIONS  (STANDING):  cefepime  Injectable.      dextrose 5%. 1000 milliLiter(s) (50 mL/Hr) IV Continuous <Continuous>  dextrose 50% Injectable 12.5 Gram(s) IV Push once  dextrose 50% Injectable 25 Gram(s) IV Push once  dextrose 50% Injectable 25 Gram(s) IV Push once  filgrastim-sndz Injectable 300 MICROGram(s) SubCutaneous daily  heparin  Injectable 5000 Unit(s) SubCutaneous every 12 hours  insulin lispro (HumaLOG) corrective regimen sliding scale   SubCutaneous three times a day before meals  lactated ringers. 1000 milliLiter(s) (100 mL/Hr) IV Continuous <Continuous>  lidocaine   Patch 2 Patch Transdermal daily  pantoprazole  Injectable 40 milliGRAM(s) IV Push daily  potassium chloride  10 mEq/100 mL IVPB 10 milliEquivalent(s) IV Intermittent every 1 hour    Allergies    penicillin (Rash)    Intolerances      Social: no smoking, no alcohol, no illegal drugs; no recent travel, no exposure to TB  FAMILY HISTORY:  No pertinent family history in first degree relatives     no history of premature cardiovascular disease in first degree relatives    ROS: the patient denies fever, no chills, no HA, no dizziness, no sore throat, no blurry vision, no CP, no palpitations, no SOB, no diarrhea, no dysuria, no leg pain, no claudication, no rash, no joint aches, no rectal pain or bleeding, no night sweats  All other systems reviewed and are negative    Vital Signs Last 24 Hrs  T(C): 37.4 (2018 13:50), Max: 37.9 (2018 21:03)  T(F): 99.3 (2018 13:50), Max: 100.2 (2018 21:03)  HR: 81 (2018 13:50) (72 - 81)  BP: 128/47 (2018 13:50) (111/33 - 143/51)  BP(mean): --  RR: 16 (2018 13:50) (16 - 18)  SpO2: 97% (2018 13:50) (92% - 99%)  Daily Height in cm: 162.56 (2018 18:50)    Daily     PE:    Constitutional: frail looking, NGT in place  HEENT: NC/AT, EOMI, PERRLA, conjunctivae clear; ears and nose atraumatic; pharynx benign  Neck: supple; thyroid not palpable  Back: no tenderness  Respiratory: decreased breath sounds  Cardiovascular: S1S2 regular, no murmurs  Abdomen: soft, not tender, not distended, positive BS; liver and spleen WNL  Genitourinary: no suprapubic tenderness  Lymphatic: no LN palpable  Musculoskeletal: no muscle tenderness, no joint swelling or tenderness  Extremities: no pedal edema  Neurological/ Psychiatric:  moving all extremities  Skin: no rashes; no palpable lesions    Labs: all available labs reviewed                        7.7    0.67  )-----------( 49       ( 2018 03:04 )             24.0     07-22    140  |  104  |  9   ----------------------------<  112<H>  3.2<L>   |  30  |  0.84    Ca    7.9<L>      2018 03:04    TPro  5.5<L>  /  Alb  2.4<L>  /  TBili  0.4  /  DBili  x   /  AST  4<L>  /  ALT  11<L>  /  AlkPhos  52  07-22     LIVER FUNCTIONS - ( 2018 03:04 )  Alb: 2.4 g/dL / Pro: 5.5 gm/dL / ALK PHOS: 52 U/L / ALT: 11 U/L / AST: 4 U/L / GGT: x           Urinalysis Basic - ( 2018 11:00 )    Color: Yellow / Appearance: Clear / S.005 / pH: x  Gluc: x / Ketone: Negative  / Bili: Negative / Urobili: 1 mg/dL   Blood: x / Protein: 30 mg/dL / Nitrite: Negative   Leuk Esterase: Trace / RBC: 6-10 /HPF / WBC 0-2   Sq Epi: x / Non Sq Epi: Few / Bacteria: Occasional          Radiology: all available radiological tests reviewed    Advanced directives addressed: full resuscitation

## 2018-07-22 NOTE — H&P ADULT - NSHPLABSRESULTS_GEN_ALL_CORE
Labs:                          8.8    0.58  )-----------( 50       ( 21 Jul 2018 19:26 )             27.0       07-21    137  |  100  |  12  ----------------------------<  137<H>  3.2<L>   |  31  |  0.97    Ca    8.9      21 Jul 2018 19:26    TPro  6.7  /  Alb  3.1<L>  /  TBili  0.5  /  DBili  x   /  AST  6<L>  /  ALT  13  /  AlkPhos  63  07-21      PT/INR - ( 21 Jul 2018 19:26 )   PT: 15.0 sec;   INR: 1.38 ratio         PTT - ( 21 Jul 2018 19:26 )  PTT:27.6 sec    Lactate, Blood: 2.1 mmol/L (07.21.18 @ 23:44)

## 2018-07-22 NOTE — PROGRESS NOTE ADULT - SUBJECTIVE AND OBJECTIVE BOX
KUB shows NGT Curling in fundus. Pulled back 2 inches and resecured/taped.  continues to flush easily with + Air audible upon insufflation.    Continue to monitor.

## 2018-07-22 NOTE — CONSULT NOTE ADULT - SUBJECTIVE AND OBJECTIVE BOX
Patient seen and examined with assistance of son who translated.    This is an 84 y/o female with a PMHx of non-Hodgkin's lymphoma on chemo (last chemo x2 weeks), HTN, chronic kidney dz, appendectomy, s/p cholecystectomy, Afib (new onset 8/28/16), on Eliquis  presents to the ED c/o intermittent, occasionally severe abd pain described as squeezing and cramping +emesis. No flatulence passed today,  Was previously hospitalized for SBO resolved with conservative management. Recurrent SBO. As per son, pt's pain is the same pain as when she was diagnosed with SBO.  Was noted to have an L1 fracture on the abd Ct scan on admission.  She does complain of mild lower back pain with movement. Thsi started 3 days ago. Pain is mild. She denies prior trauma. Has no hampered her mobility. Denies sciatica or LE weakness.  She is presently neutropenic and on appropriate isolation precautions.    PE:    Elderly female - appears comfortable  Able to transition in/out of bed without assistance, ambulating independently    Chest: no rib pain or tenderness  ABD: slightly distended, NGT in place  LE: no signs of external trama, No edema, well perfused  BACK: no deformity, no tenderness to palpation or percussion. No skin lesions.  NEURO: grossly intact all groups LE, no sensory deficits to light touch.    DIAGNOSTICS:  CT ABDOMEN AND PELVIS OC IC                          PROCEDURE DATE:  07/21/2018      INTERPRETATION:  CLINICAL INFORMATION: Abdominal pain. Nausea. Vomiting.    TECHNIQUE: CT imaging of the abdomen and pelvis was performed with IV and   oral contrast. 90 mL of Omnipaque 350 was injected intravenously. 10 mL   was discarded.    COMPARISON: July 2, 2018    FINDINGS:    LOWER CHEST: Within normal limits.    HEPATOBILIARY: Cholecystectomy. Fatty liver and hepatomegaly. No biliary   ductal dilation. Small volume perihepatic ascites.  PANCREAS: Atrophic.  SPLEEN: Splenomegaly with numerous hypodense splenic lesions, likely   related to history of lymphoma.   ADRENALS: Within normal limits.    KIDNEYS/URETERS/BLADDER: Bilateral renal atrophy and left upper pole   renal cyst. No hydronephrosis or urinary calculus. Bladder within normal   limits.  REPRODUCTIVE ORGANS: Hysterectomy. Small amount of free pelvic fluid.    BOWEL/PERITONEUM: No pneumoperitoneum. Small bowel obstruction with   transition point in the right lower quadrant, similar configuration to   the prior study. Mild mesenteric and interloop edema. The appendix is   surgically absent. Scattered colonic diverticula.    VESSELS:  Mild calcific atherosclerosis. No abdominal aortic aneurysm.  LYMPHATICS/RETROPERITONEUM: No lymphadenopathy. No retroperitoneal   hematoma.    SOFT TISSUES: Laparotomy scar.  BONES: Generalized osteopenia and spinal degenerative changes. Mild   compression deformity of the superior endplate of L1, new from the prior   examination.    IMPRESSION: Small bowel obstruction with transition  point in the right lower quadrant, similar in configuration to the prior   study. Mild mesenteric edema and ascites.    Mild compression deformity of the superior endplate of L1, new from the   prior examination.

## 2018-07-22 NOTE — CONSULT NOTE ADULT - ASSESSMENT
IMPRESSION:    Very mild superior endplate compression fx  Review of CT films is not suspicious for a metastatic lesion or pathologic fracture.  Her pain is mild and not limiting    No indications for surgical intervention  Supportive care only  Brace contraindicated due to abdominal issues    Suggest activity modification, supportive care.   Her pain should resolve with healing over the next few weeks    Suggest F/U as outpatient if her symptoms persist    Continue medical / Surgical care for her SBO, Lymphoma, etc.

## 2018-07-22 NOTE — CONSULT NOTE ADULT - SUBJECTIVE AND OBJECTIVE BOX
Patient is a 83y old  Female who presents with a chief complaint of Abdominal pain, diagnosed with SBO.      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 also has involvement of the spleen and retro-peritoneal LNs   no BM involvement as per son.  primary hematologist Dr Polanco in [place]. s/p 4 cycles of R CHOP with 5 days of neupogen,  she has had multiple SBOs in the past presented with few hours of crampy abdominal pian, she is nauseous and had 2 bowel movements today but not passing gas. She induced vomiting once. No fever no dysuria. Last chemo 2 weeks ago. Son at bedside who translated. Seems depressed, generalized weakness does not want surgical intervention or NGT. (22 Jul 2018 00:25)    noted to have SBO with a transitional point.    also noted to be anemic and severely neutropenic. not febrile.    the patient is French speaking. history obtained from the son who is a surgical PA.        PAST MEDICAL & SURGICAL HISTORY:  CKD (chronic kidney disease) stage 3, GFR 30-59 ml/min  Anemia, unspecified type  GERD (gastroesophageal reflux disease)  High cholesterol  Diabetes  HTN (hypertension)  H/O: hysterectomy  S/P cholecystectomy  S/P appendectomy      REVIEW OF SYSTEMS    General:	The patient weak  no unexpected weight loss. Appetite good. no night sweats.  Skin: no Rash.	  ENT: no sore throat or oral pain. no difficulty with swallowing  Respiratory: No chest pain, No shortness of breath, no hemoptysis, no cough, no chest pain.  Cardiovascular : No chest pain. no palpitation.  Gastrointestinal: abdominal pain, nausea and vomiting. no BMs; now on NGT  Genitourinary: No hematuria , no dysuria	  Musculoskeletal: No myalgia, no arthralgia, no back pain, no joint swelling  Neurological: no headaches, no dizzyness, +weakness, no double vision. 	  Psychiatric: no change in mood. 	  Hematology/Lymphatics: weakness. 	  Endocrine:	no burning in urine or frequency  Allergic/Immunologic:	 no fever.     Allergies    penicillin (Rash)    Intolerances          FAMILY HISTORY:  No pertinent family history in first degree relatives      Home Medications:  atorvastatin 10 mg oral tablet: 1 tab(s) orally once a day (22 Jul 2018 10:01)  furosemide 20 mg oral tablet: 0.5 milligram(s) orally once a day (22 Jul 2018 10:01)  levoFLOXacin 500 mg oral tablet: 1 tab(s) orally every 24 hours for 4 days (22 Jul 2018 10:01)  metoprolol tartrate 25 mg oral tablet: 0.5 tab(s) orally once a day (22 Jul 2018 10:01)  omeprazole 40 mg oral delayed release capsule: 1 cap(s) orally once a day (22 Jul 2018 10:01)      MEDICATIONS  (STANDING):  cefepime  Injectable. 1000 milliGRAM(s) IV Push once  cefepime  Injectable.      dextrose 5%. 1000 milliLiter(s) (50 mL/Hr) IV Continuous <Continuous>  dextrose 50% Injectable 12.5 Gram(s) IV Push once  dextrose 50% Injectable 25 Gram(s) IV Push once  dextrose 50% Injectable 25 Gram(s) IV Push once  filgrastim-sndz Injectable 300 MICROGram(s) SubCutaneous daily  heparin  Injectable 5000 Unit(s) SubCutaneous every 12 hours  insulin lispro (HumaLOG) corrective regimen sliding scale   SubCutaneous three times a day before meals  lactated ringers. 1000 milliLiter(s) (100 mL/Hr) IV Continuous <Continuous>  lidocaine   Patch 2 Patch Transdermal daily  pantoprazole  Injectable 40 milliGRAM(s) IV Push daily  potassium chloride  10 mEq/100 mL IVPB 10 milliEquivalent(s) IV Intermittent every 1 hour    MEDICATIONS  (PRN):  dextrose 40% Gel 15 Gram(s) Oral once PRN Blood Glucose LESS THAN 70 milliGRAM(s)/deciliter  glucagon  Injectable 1 milliGRAM(s) IntraMuscular once PRN Glucose LESS THAN 70 milligrams/deciliter  morphine  - Injectable 2 milliGRAM(s) IV Push every 6 hours PRN Severe Pain (7 - 10)      PHYSICAL EXAM:  Vital Signs Last 24 Hrs  T(C): 37.4 (22 Jul 2018 13:50), Max: 37.9 (21 Jul 2018 21:03)  T(F): 99.3 (22 Jul 2018 13:50), Max: 100.2 (21 Jul 2018 21:03)  HR: 81 (22 Jul 2018 13:50) (72 - 81)  BP: 128/47 (22 Jul 2018 13:50) (111/33 - 143/51)  BP(mean): --  RR: 16 (22 Jul 2018 13:50) (16 - 18)  SpO2: 97% (22 Jul 2018 13:50) (92% - 99%)      Gen: the patient appears chronically ill. pale.  HEENT: normocephalic/atraumatic, no conjunctival pallor, no scleral icterus, no oral thrush/mucosal bleeding/mucositis  Neck: supple, no masses, no JVD  Breasts: deferred  Back: nontender  Cardiovascular: heart sounds Normal S1S2, no murmurs/rubs/gallops  Respiratory: air entry normal and equal on both sides. no wheeze, no ronchi,   Gastrointestinal: BS, soft, NT/ND, no masses, no splenomegaly, no hepatomegaly,   no evidence for ascites  Genitourinary: deferred  Rectal: deferred high pitched.  Extremities: no clubbing/cyanosis, no edema, no calf tenderness  Neurological:  Alert oriented X3 cranial nerves normal. no focal deficits  Skin: pallor  Lymph Nodes:  no cervical/supraclavicular LAD, no axillary/groin LAD  Musculoskeletal:  full ROM  Psychiatric:  mood appears normal. not obviously anxious or depressed.        LABS:                        7.7    0.67  )-----------( 49       ( 22 Jul 2018 03:04 )             24.0     22 Jul 2018 03:04    140    |  104    |  9      ----------------------------<  112    3.2     |  30     |  0.84     Ca    7.9        22 Jul 2018 03:04    TPro  5.5    /  Alb  2.4    /  TBili  0.4    /  DBili  x      /  AST  4      /  ALT  11     /  AlkPhos  52     22 Jul 2018 03:04    LIVER FUNCTIONS - ( 22 Jul 2018 03:04 )  Alb: 2.4 g/dL / Pro: 5.5 gm/dL / ALK PHOS: 52 U/L / ALT: 11 U/L / AST: 4 U/L / GGT: x           PT/INR - ( 21 Jul 2018 19:26 )   PT: 15.0 sec;   INR: 1.38 ratio         PTT - ( 21 Jul 2018 19:26 )  PTT:27.6 sec      RADIOLOGY & ADDITIONAL STUDIES:  small bowel obstruction with transition point    FINDINGS:    LOWER CHEST: Within normal limits.    HEPATOBILIARY: Cholecystectomy. Fatty liver and hepatomegaly. No biliary   ductal dilation. Small volume perihepatic ascites.  PANCREAS: Atrophic.  SPLEEN: Splenomegaly with numerous hypodense splenic lesions, likely   related to history of lymphoma.   ADRENALS: Within normal limits.    KIDNEYS/URETERS/BLADDER: Bilateral renal atrophy and left upper pole   renal cyst. No hydronephrosis or urinary calculus. Bladder within normal   limits.  REPRODUCTIVE ORGANS: Hysterectomy. Small amount of free pelvic fluid.    BOWEL/PERITONEUM: No pneumoperitoneum. Small bowel obstruction with   transition point in the right lower quadrant, similar configuration to   the prior study. Mild mesenteric and interloop edema. The appendix is   surgically absent. Scattered colonic diverticula.    VESSELS:  Mild calcific atherosclerosis. No abdominal aortic aneurysm.  LYMPHATICS/RETROPERITONEUM: No lymphadenopathy. No retroperitoneal   hematoma.      SOFT TISSUES: Laparotomy scar.  BONES: Generalized osteopenia and spinal degenerative changes. Mild   compression deformity of the superior endplate of L1, new from the prior   examination.    IMPRESSION: Small bowel obstruction with transition  point in the right lower quadrant, similar in configuration to the prior   study. Mild mesenteric edema and ascites.    Mild compression deformity of the superior endplate of L1, new from the   prior examination.

## 2018-07-22 NOTE — H&P ADULT - NSHPPHYSICALEXAM_GEN_ALL_CORE
Vital Signs Last 24 Hrs  T(C): 36.8 (22 Jul 2018 00:26), Max: 37.9 (21 Jul 2018 21:03)  T(F): 98.3 (22 Jul 2018 00:26), Max: 100.2 (21 Jul 2018 21:03)  HR: 76 (22 Jul 2018 00:26) (76 - 81)  BP: 127/52 (22 Jul 2018 00:26) (127/52 - 143/51)  BP(mean): --  RR: 17 (22 Jul 2018 00:26) (16 - 17)  SpO2: 94% (22 Jul 2018 00:26) (94% - 99%)    PHYSICAL EXAM: generalized weakness,   Constitutional: NAD, GCS: 15/15  AOX3  Eyes:  WNL  ENMT:  WNL  Neck:  WNL, non tender  Back: Non tender  Respiratory: CTABL  Cardiovascular:  S1+S2+0  Gastrointestinal: Soft, mild distension, mild lower adnominal tenderness. No rebound or guarding.  Genitourinary:  WNL  Extremities: NV intact  Vascular:  Intact  Neurological: No focal neurological deficit,  CN, motor and sensory system grossly intact.  Skin: WNL  Musculoskeletal: WNL  Psychiatric: Grossly WNL

## 2018-07-22 NOTE — PROGRESS NOTE ADULT - SUBJECTIVE AND OBJECTIVE BOX
CC: increasing abd pain    HPI: 83 year old female with lymphoma on chemo now with SBO with NGT presents with worsening abdominal pain.  Notified by RN patient's NGT not putting out much and pain not controlled. Patient seen and examined at bedside. As per patient's son, pain not controlled with 2mg morphine. Also c/o mild nausea. Denies fevers, chills, SOB, CP. No flatus or BM since admission.     MEDICATIONS  (STANDING):  dextrose 5%. 1000 milliLiter(s) (50 mL/Hr) IV Continuous <Continuous>  dextrose 50% Injectable 12.5 Gram(s) IV Push once  dextrose 50% Injectable 25 Gram(s) IV Push once  dextrose 50% Injectable 25 Gram(s) IV Push once  heparin  Injectable 5000 Unit(s) SubCutaneous every 12 hours  insulin lispro (HumaLOG) corrective regimen sliding scale   SubCutaneous three times a day before meals  lactated ringers. 1000 milliLiter(s) (100 mL/Hr) IV Continuous <Continuous>  pantoprazole  Injectable 40 milliGRAM(s) IV Push daily    MEDICATIONS  (PRN):  dextrose 40% Gel 15 Gram(s) Oral once PRN Blood Glucose LESS THAN 70 milliGRAM(s)/deciliter  glucagon  Injectable 1 milliGRAM(s) IntraMuscular once PRN Glucose LESS THAN 70 milligrams/deciliter  morphine  - Injectable 2 milliGRAM(s) IV Push every 6 hours PRN Severe Pain (7 - 10)    ICU Vital Signs Last 24 Hrs  T(C): 37 (22 Jul 2018 05:02), Max: 37.9 (21 Jul 2018 21:03)  T(F): 98.6 (22 Jul 2018 05:02), Max: 100.2 (21 Jul 2018 21:03)  HR: 79 (22 Jul 2018 05:02) (72 - 81)  BP: 118/41 (22 Jul 2018 05:02) (118/41 - 143/51)  BP(mean): --  ABP: --  ABP(mean): --  RR: 18 (22 Jul 2018 05:02) (16 - 18)  SpO2: 92% (22 Jul 2018 05:02) (92% - 99%)    Gen: NAD, appears uncomfortable in bed  Abd: soft ND, mild lower abd tenderness, +BS normoactive, no rebound no guarding. NGT in place with some output.   Neuro: A&Ox3    A/P: 83 year old female with lymphoma on chemo now with SBO with NGT presents with worsening abdominal pain.   - KUB for NGT placement  - stat dose of morphine  - d/w Dr. Carballo - patient to remain on morphine 2mg q 6 and will treat breakthrough pain  - will continue to monitor

## 2018-07-22 NOTE — CONSULT NOTE ADULT - ASSESSMENT
83 Y old female with multiple medical problems, on chemo for lymphoma s/p 5 cycles of R CHOP last chemo completed 7/11, multiple SBOs in the past presented with few hours of crampy abdominal pain on 7/21 with associated vomiting, no gas, 2 bms. Here noted with pancytopenia, no fever, CT abd/pelvis with SBO, xray with LLL possible PNA was eval by surgery and NGT placed. Reports abd pain and intermittent cough.      1. LLL PNA/SBO/pancytopenia/lymphoma/PCN allergy  - has rash with pcn remotely in past  - low cross reactivity with cephalosporins  - start IV cefepime [id number] for gnr resistant coverage renally dosed   - monitor for rash/side effects-  - d/c levaquin  - NGT/bowel rest for SBO - surgery following  - f/u cultures  - monitor temps  - supportive care    2. other issues -c are per medicine

## 2018-07-22 NOTE — PROGRESS NOTE ADULT - SUBJECTIVE AND OBJECTIVE BOX
83 Y old female with multiple medical problems, on chemo fro lymphoma, multiple SBOs in the past presented with few hours of crampy abdominal pian, she is nauseous and had 2 bowel movements today but not passing gas. She induced vomiting once. No fever no dysuria. Last chemo 2 weeks ago. Son at bedside who translated. Seems depressed, generalized weakness does not want surgical intervention or NGT.   Clinically improved, NGT working well. Pain 3/10.  Case discussed in detail with family, possibilities of surgical intervention was noted.    Vital Signs Last 24 Hrs  T(C): 37.4 (2018 13:50), Max: 37.9 (2018 21:03)  T(F): 99.3 (2018 13:50), Max: 100.2 (2018 21:03)  HR: 81 (2018 13:50) (72 - 81)  BP: 128/47 (2018 13:50) (111/33 - 143/51)  BP(mean): --  RR: 16 (2018 13:50) (16 - 18)  SpO2: 97% (2018 13:50) (92% - 99%)      CARDIAC MARKERS ( 2018 03:04 )  <0.015 ng/mL / x     / 14 U/L / x     / x                                7.7    0.67  )-----------( 49       ( 2018 03:04 )             24.0     CBC Full  -  ( 2018 03:04 )  WBC Count : 0.67 K/uL  Hemoglobin : 7.7 g/dL  Hematocrit : 24.0 %  Platelet Count - Automated : 49 K/uL  Mean Cell Volume : 89.2 fl  Mean Cell Hemoglobin : 28.6 pg  Mean Cell Hemoglobin Concentration : 32.1 gm/dL  Auto Neutrophil # : x  Auto Lymphocyte # : x  Auto Monocyte # : x  Auto Eosinophil # : x  Auto Basophil # : x  Auto Neutrophil % : x  Auto Lymphocyte % : x  Auto Monocyte % : x  Auto Eosinophil % : x  Auto Basophil % : x    -    140  |  104  |  9   ----------------------------<  112<H>  3.2<L>   |  30  |  0.84    Ca    7.9<L>      2018 03:04    TPro  5.5<L>  /  Alb  2.4<L>  /  TBili  0.4  /  DBili  x   /  AST  4<L>  /  ALT  11<L>  /  AlkPhos  52  07-22    LIVER FUNCTIONS - ( 2018 03:04 )  Alb: 2.4 g/dL / Pro: 5.5 gm/dL / ALK PHOS: 52 U/L / ALT: 11 U/L / AST: 4 U/L / GGT: x           Urinalysis Basic - ( 2018 11:00 )    Color: Yellow / Appearance: Clear / S.005 / pH: x  Gluc: x / Ketone: Negative  / Bili: Negative / Urobili: 1 mg/dL   Blood: x / Protein: 30 mg/dL / Nitrite: Negative   Leuk Esterase: Trace / RBC: 6-10 /HPF / WBC 0-2   Sq Epi: x / Non Sq Epi: Few / Bacteria: Occasional      PT/INR - ( 2018 19:26 )   PT: 15.0 sec;   INR: 1.38 ratio         PTT - ( 2018 19:26 )  PTT:27.6 sec    ROS negative other than above.

## 2018-07-22 NOTE — CONSULT NOTE ADULT - SUBJECTIVE AND OBJECTIVE BOX
Chief Complaint: abdominal pain    HPI:     83 Y old female with  NH lymphome s/p 4 RCHOP , last chemo 1.5 weeks ago,  A fib on Eliquis, MV valvular disease, GERD , recurrent SBO in the past admitted to surgical service with few hours of crampy abdominal pian, she is nauseous and had 2 bowel movements today but not passing gas. She induced vomiting once. No fever no dysuria.  Son at bedside who translating for the patient , patient prefer to be translated by the son . Seems depressed, generalized weakness, reports lower back pain, denies falls.    7/22 - pt seen and examined, + abd and lower back pain, afebrile, denies CP, palpitations, dizziness, fever, chills, reports no HA, + dyspepsia   REVIEW OF SYSTEMS:  All other review of systems is negative unless indicated above    PAST MEDICAL & SURGICAL HISTORY:  CKD (chronic kidney disease) stage 3, GFR 30-59 ml/min  Anemia, unspecified type  GERD (gastroesophageal reflux disease)  High cholesterol  Diabetes  HTN (hypertension)  H/O: hysterectomy  S/P cholecystectomy  S/P appendectomy    Social history - denies smoking, ETOH use, denies IVDU    T(C): 36.9 (07-22-18 @ 06:52), Max: 37.9 (07-21-18 @ 21:03)  T(F): 98.4 (07-22-18 @ 06:52), Max: 100.2 (07-21-18 @ 21:03)  HR: 74 (07-22-18 @ 06:52) (72 - 81)  BP: 111/33 (07-22-18 @ 06:52) (111/33 - 143/51)  RR: 16 (07-22-18 @ 06:52) (16 - 18)  SpO2: 95% (07-22-18 @ 06:52) (92% - 99%)  Wt(kg): --    POCT Blood Glucose.: 156 mg/dL (22 Jul 2018 06:52)  POCT Blood Glucose.: 130 mg/dL (22 Jul 2018 02:33)        PHYSICAL EXAM:    Constitutional: NAD, awake and alert, well-developed  HEENT: PERR, EOMI, Normal Hearing, MMM  Neck: Soft and supple, No LAD, No JVD + NGT   Respiratory: Breath sounds are clear bilaterally, No wheezing, rales or rhonchi  Cardiovascular: S1 and S2, irregular rate and rhythm, +  Murmurs, gallops or rubs  Gastrointestinal: Bowel Sounds present, soft, nontender, nondistended, no guarding, no rebound  Extremities: No peripheral edema  Vascular: 2+ peripheral pulses  Neurological: A/O x 3, no focal deficits  Musculoskeletal: 5/5 strength b/l upper and lower extremities  Skin: No rashes    Medications:  MEDICATIONS  (STANDING):  dextrose 5%. 1000 milliLiter(s) (50 mL/Hr) IV Continuous <Continuous>  dextrose 50% Injectable 12.5 Gram(s) IV Push once  dextrose 50% Injectable 25 Gram(s) IV Push once  dextrose 50% Injectable 25 Gram(s) IV Push once  heparin  Injectable 5000 Unit(s) SubCutaneous every 12 hours  insulin lispro (HumaLOG) corrective regimen sliding scale   SubCutaneous three times a day before meals  lactated ringers. 1000 milliLiter(s) (100 mL/Hr) IV Continuous <Continuous>  pantoprazole  Injectable 40 milliGRAM(s) IV Push daily      Labs: All Labs Reviewed:                        7.7    0.67  )-----------( 49       ( 22 Jul 2018 03:04 )             24.0     07-22    140  |  104  |  9   ----------------------------<  112<H>  3.2<L>   |  30  |  0.84    Ca    7.9<L>      22 Jul 2018 03:04    TPro  5.5<L>  /  Alb  2.4<L>  /  TBili  0.4  /  DBili  x   /  AST  4<L>  /  ALT  11<L>  /  AlkPhos  52  07-22    PT/INR - ( 21 Jul 2018 19:26 )   PT: 15.0 sec;   INR: 1.38 ratio         PTT - ( 21 Jul 2018 19:26 )  PTT:27.6 sec      Blood Culture:     RADIOLOGY/EKG:  < from: CT Abdomen and Pelvis w/ Oral Cont and w/ IV Cont (07.21.18 @ 20:56) >  LOWER CHEST: Within normal limits.    HEPATOBILIARY: Cholecystectomy. Fatty liver and hepatomegaly. No biliary   ductal dilation. Small volume perihepatic ascites.  PANCREAS: Atrophic.  SPLEEN: Splenomegaly with numerous hypodense splenic lesions, likely   related to history of lymphoma.   ADRENALS: Within normal limits.    KIDNEYS/URETERS/BLADDER: Bilateral renalatrophy and left upper pole   renal cyst. No hydronephrosis or urinary calculus. Bladder within normal   limits.  REPRODUCTIVE ORGANS: Hysterectomy. Small amount of free pelvic fluid.    BOWEL/PERITONEUM: No pneumoperitoneum. Small bowel obstruction with   transition point in the right lower quadrant, similar configuration to   the prior study. Mild mesenteric and interloop edema. The appendix is   surgically absent. Scattered colonic diverticula.    VESSELS:  Mild calcific atherosclerosis. No abdominal aortic aneurysm.  LYMPHATICS/RETROPERITONEUM: No lymphadenopathy. No retroperitoneal   hematoma.      SOFT TISSUES: Laparotomy scar.  BONES: Generalized osteopenia and spinal degenerative changes. Mild   compression deformity of the superior endplate of L1, new from the prior   examination.    IMPRESSION: Small bowel obstruction with transition  point in the right lower quadrant, similar in configuration to the prior   study. Mild mesenteric edema and ascites.    Mild compression deformityof the superior endplate of L1, new from the   prior examination.    < end of copied text >  MEDICATIONS  (STANDING):  dextrose 5%. 1000 milliLiter(s) (50 mL/Hr) IV Continuous <Continuous>  dextrose 50% Injectable 12.5 Gram(s) IV Push once  dextrose 50% Injectable 25 Gram(s) IV Push once  dextrose 50% Injectable 25 Gram(s) IV Push once  heparin  Injectable 5000 Unit(s) SubCutaneous every 12 hours  insulin lispro (HumaLOG) corrective regimen sliding scale   SubCutaneous three times a day before meals  lactated ringers. 1000 milliLiter(s) (100 mL/Hr) IV Continuous <Continuous>  levoFLOXacin IVPB      pantoprazole  Injectable 40 milliGRAM(s) IV Push daily    MEDICATIONS  (PRN):  dextrose 40% Gel 15 Gram(s) Oral once PRN Blood Glucose LESS THAN 70 milliGRAM(s)/deciliter  glucagon  Injectable 1 milliGRAM(s) IntraMuscular once PRN Glucose LESS THAN 70 milligrams/deciliter  morphine  - Injectable 2 milliGRAM(s) IV Push every 6 hours PRN Severe Pain (7 - 10)

## 2018-07-22 NOTE — PROGRESS NOTE ADULT - SUBJECTIVE AND OBJECTIVE BOX
Pt son concerned that NGT output decreased,  Asked by RN to evaluate.    NGT: taped in position, does not seem to have moved.  + air audible over stomach upon insufflation, Flushes easily with return of flush/green tinged on suction    Abdomen: ND,+ BS,  soft, mild tenderness to palpation, no rebound or guarding     A/P: NGT evaluated         appears in position         continue to monitor /reevaluate as needed

## 2018-07-22 NOTE — CHART NOTE - NSCHARTNOTEFT_GEN_A_CORE
Pt seen and examined for NGT malfunctioning. NGT tip in fundus. Pulled out 6 inches and working fine. Pt abdomen soft, No c/o pain. C/W current management.

## 2018-07-22 NOTE — PROVIDER CONTACT NOTE (CRITICAL VALUE NOTIFICATION) - ACTION/TREATMENT ORDERED:
MD called and made aware. No new orders at this time. Continue neutropenic precautions.
normal saline 500ml IV bolus

## 2018-07-22 NOTE — H&P ADULT - ASSESSMENT
83 y old female with lymphoma, on chemo, with SBO, on Eliquis.New l1 fracture, no back pain     NPO  I hydration  Trend labs, lactate  serial abdominal exam  NGT   Spine consult  Spine precautions  Medicine consult for HTN, A fib,DM management  D/W Pt and family in detail she was initially refusing NGT, now agreeing but she does not want surgical intervention if not resolving medically  Palliative consult in am

## 2018-07-23 LAB
ANION GAP SERPL CALC-SCNC: 8 MMOL/L — SIGNIFICANT CHANGE UP (ref 5–17)
BUN SERPL-MCNC: 8 MG/DL — SIGNIFICANT CHANGE UP (ref 7–23)
CALCIUM SERPL-MCNC: 8.7 MG/DL — SIGNIFICANT CHANGE UP (ref 8.5–10.1)
CHLORIDE SERPL-SCNC: 104 MMOL/L — SIGNIFICANT CHANGE UP (ref 96–108)
CO2 SERPL-SCNC: 28 MMOL/L — SIGNIFICANT CHANGE UP (ref 22–31)
CREAT SERPL-MCNC: 0.9 MG/DL — SIGNIFICANT CHANGE UP (ref 0.5–1.3)
CULTURE RESULTS: NO GROWTH — SIGNIFICANT CHANGE UP
FERRITIN SERPL-MCNC: 187 NG/ML — HIGH (ref 15–150)
FOLATE SERPL-MCNC: >20 NG/ML — SIGNIFICANT CHANGE UP
GLUCOSE BLDC GLUCOMTR-MCNC: 76 MG/DL — SIGNIFICANT CHANGE UP (ref 70–99)
GLUCOSE BLDC GLUCOMTR-MCNC: 84 MG/DL — SIGNIFICANT CHANGE UP (ref 70–99)
GLUCOSE BLDC GLUCOMTR-MCNC: 93 MG/DL — SIGNIFICANT CHANGE UP (ref 70–99)
GLUCOSE SERPL-MCNC: 79 MG/DL — SIGNIFICANT CHANGE UP (ref 70–99)
HCT VFR BLD CALC: 27.2 % — LOW (ref 34.5–45)
HGB BLD-MCNC: 8.4 G/DL — LOW (ref 11.5–15.5)
IRON SATN MFR SERPL: 11 UG/DL — LOW (ref 30–160)
IRON SATN MFR SERPL: 12 UG/DL — LOW (ref 30–160)
IRON SATN MFR SERPL: 13 % — LOW (ref 14–50)
IRON SATN MFR SERPL: 13 % — LOW (ref 14–50)
MAGNESIUM SERPL-MCNC: 1.4 MG/DL — LOW (ref 1.6–2.6)
MCHC RBC-ENTMCNC: 27.1 PG — SIGNIFICANT CHANGE UP (ref 27–34)
MCHC RBC-ENTMCNC: 30.9 GM/DL — LOW (ref 32–36)
MCV RBC AUTO: 87.7 FL — SIGNIFICANT CHANGE UP (ref 80–100)
NRBC # BLD: 2 /100 WBCS — HIGH (ref 0–0)
PLATELET # BLD AUTO: 78 K/UL — LOW (ref 150–400)
POTASSIUM SERPL-MCNC: 3.6 MMOL/L — SIGNIFICANT CHANGE UP (ref 3.5–5.3)
POTASSIUM SERPL-SCNC: 3.6 MMOL/L — SIGNIFICANT CHANGE UP (ref 3.5–5.3)
RBC # BLD: 3.1 M/UL — LOW (ref 3.8–5.2)
RBC # FLD: 19 % — HIGH (ref 10.3–14.5)
SODIUM SERPL-SCNC: 140 MMOL/L — SIGNIFICANT CHANGE UP (ref 135–145)
SPECIMEN SOURCE: SIGNIFICANT CHANGE UP
TIBC SERPL-MCNC: 87 UG/DL — LOW (ref 220–430)
TIBC SERPL-MCNC: 91 UG/DL — LOW (ref 220–430)
TRANSFERRIN SERPL-MCNC: 64 MG/DL — LOW (ref 200–360)
TSH SERPL-MCNC: 1.14 UU/ML — SIGNIFICANT CHANGE UP (ref 0.36–3.74)
UIBC SERPL-MCNC: 76 UG/DL — LOW (ref 110–370)
UIBC SERPL-MCNC: 79 UG/DL — LOW (ref 110–370)
VIT B12 SERPL-MCNC: >2000 PG/ML — HIGH (ref 232–1245)
WBC # BLD: 1.15 K/UL — LOW (ref 3.8–10.5)
WBC # FLD AUTO: 1.15 K/UL — LOW (ref 3.8–10.5)

## 2018-07-23 PROCEDURE — 74250 X-RAY XM SM INT 1CNTRST STD: CPT | Mod: 26

## 2018-07-23 PROCEDURE — 99232 SBSQ HOSP IP/OBS MODERATE 35: CPT

## 2018-07-23 RX ORDER — SODIUM FERRIC GLUCONAT/SUCROSE 62.5MG/5ML
125 AMPUL (ML) INTRAVENOUS
Qty: 0 | Refills: 0 | Status: COMPLETED | OUTPATIENT
Start: 2018-07-23 | End: 2018-07-25

## 2018-07-23 RX ORDER — MAGNESIUM SULFATE 500 MG/ML
1 VIAL (ML) INJECTION ONCE
Qty: 0 | Refills: 0 | Status: COMPLETED | OUTPATIENT
Start: 2018-07-23 | End: 2018-07-23

## 2018-07-23 RX ORDER — SODIUM FERRIC GLUCONAT/SUCROSE 62.5MG/5ML
125 AMPUL (ML) INTRAVENOUS ONCE
Qty: 0 | Refills: 0 | Status: DISCONTINUED | OUTPATIENT
Start: 2018-07-23 | End: 2018-07-23

## 2018-07-23 RX ADMIN — LIDOCAINE 2 PATCH: 4 CREAM TOPICAL at 00:09

## 2018-07-23 RX ADMIN — Medication 100 GRAM(S): at 10:25

## 2018-07-23 RX ADMIN — CEFEPIME 1000 MILLIGRAM(S): 1 INJECTION, POWDER, FOR SOLUTION INTRAMUSCULAR; INTRAVENOUS at 06:08

## 2018-07-23 RX ADMIN — LIDOCAINE 2 PATCH: 4 CREAM TOPICAL at 22:16

## 2018-07-23 RX ADMIN — Medication 110 MILLIGRAM(S): at 17:50

## 2018-07-23 RX ADMIN — LIDOCAINE 2 PATCH: 4 CREAM TOPICAL at 12:25

## 2018-07-23 RX ADMIN — CEFEPIME 1000 MILLIGRAM(S): 1 INJECTION, POWDER, FOR SOLUTION INTRAMUSCULAR; INTRAVENOUS at 17:43

## 2018-07-23 RX ADMIN — HEPARIN SODIUM 5000 UNIT(S): 5000 INJECTION INTRAVENOUS; SUBCUTANEOUS at 17:43

## 2018-07-23 RX ADMIN — Medication 300 MICROGRAM(S): at 12:30

## 2018-07-23 RX ADMIN — PANTOPRAZOLE SODIUM 40 MILLIGRAM(S): 20 TABLET, DELAYED RELEASE ORAL at 12:25

## 2018-07-23 RX ADMIN — SODIUM CHLORIDE 100 MILLILITER(S): 9 INJECTION, SOLUTION INTRAVENOUS at 22:18

## 2018-07-23 NOTE — PROGRESS NOTE ADULT - SUBJECTIVE AND OBJECTIVE BOX
CC:Patient is a 83y old  Female who presents with a chief complaint of Abdominal pain, unable to pass gas. (22 Jul 2018 00:25)      Subjective:  Pt on neutropenic precautions    Pt seen and examined at bedside with chaperone, . Pt is AAOx3, pt in no acute distress. Pt states improved abd pain since admission. Pt denied c/o fever, chills, chest pain, SOB, N/V/D, extremity pain or dysfunction, hemoptysis, hematemesis, hematuria, hematochexia, headache, diplopia, vertigo, dizzyness. Pt state(+) void, (+) oob, (+) bowel function of flatus and bm    ROS:  abd pain, otherwise negative ROS    Vital Signs Last 24 Hrs  T(C): 36.6 (23 Jul 2018 06:26), Max: 38.1 (22 Jul 2018 20:21)  T(F): 97.8 (23 Jul 2018 06:26), Max: 100.6 (22 Jul 2018 20:21)  HR: 74 (23 Jul 2018 06:26) (74 - 90)  BP: 126/37 (23 Jul 2018 06:26) (126/37 - 128/45)  BP(mean): --  RR: 18 (23 Jul 2018 06:26) (18 - 18)  SpO2: 95% (23 Jul 2018 06:26) (95% - 95%)    Labs:      CARDIAC MARKERS ( 22 Jul 2018 03:04 )  <0.015 ng/mL / x     / 14 U/L / x     / x                                8.4    1.15  )-----------( 78       ( 23 Jul 2018 06:34 )             27.2     CBC Full  -  ( 23 Jul 2018 06:34 )  WBC Count : 1.15 K/uL  Hemoglobin : 8.4 g/dL  Hematocrit : 27.2 %  Platelet Count - Automated : 78 K/uL  Mean Cell Volume : 87.7 fl  Mean Cell Hemoglobin : 27.1 pg  Mean Cell Hemoglobin Concentration : 30.9 gm/dL  Auto Neutrophil # : x  Auto Lymphocyte # : x  Auto Monocyte # : x  Auto Eosinophil # : x  Auto Basophil # : x  Auto Neutrophil % : x  Auto Lymphocyte % : x  Auto Monocyte % : x  Auto Eosinophil % : x  Auto Basophil % : x    07-23    140  |  104  |  8   ----------------------------<  79  3.6   |  28  |  0.90    Ca    8.7      23 Jul 2018 06:34  Mg     1.4     07-23    TPro  5.5<L>  /  Alb  2.4<L>  /  TBili  0.4  /  DBili  x   /  AST  4<L>  /  ALT  11<L>  /  AlkPhos  52  07-22    LIVER FUNCTIONS - ( 22 Jul 2018 03:04 )  Alb: 2.4 g/dL / Pro: 5.5 gm/dL / ALK PHOS: 52 U/L / ALT: 11 U/L / AST: 4 U/L / GGT: x           PT/INR - ( 21 Jul 2018 19:26 )   PT: 15.0 sec;   INR: 1.38 ratio         PTT - ( 21 Jul 2018 19:26 )  PTT:27.6 sec      Meds:  cefepime  Injectable. 1000 milliGRAM(s) IV Push every 12 hours  cefepime  Injectable.      dextrose 40% Gel 15 Gram(s) Oral once PRN  dextrose 5%. 1000 milliLiter(s) IV Continuous <Continuous>  dextrose 50% Injectable 12.5 Gram(s) IV Push once  dextrose 50% Injectable 25 Gram(s) IV Push once  dextrose 50% Injectable 25 Gram(s) IV Push once  [id number]-[id number] Injectable 300 MICROGram(s) SubCutaneous daily  glucagon  Injectable 1 milliGRAM(s) IntraMuscular once PRN  heparin  Injectable 5000 Unit(s) SubCutaneous every 12 hours  insulin lispro (HumaLOG) corrective regimen sliding scale   SubCutaneous three times a day before meals  lactated ringers. 1000 milliLiter(s) IV Continuous <Continuous>  lidocaine   Patch 2 Patch Transdermal daily  morphine  - Injectable 2 milliGRAM(s) IV Push every 6 hours PRN  pantoprazole  Injectable 40 milliGRAM(s) IV Push daily  sodium ferric gluconate complex IVPB 125 milliGRAM(s) IV Intermittent <User Schedule>      Radiology:  Pending small bowel series    Physical exam:  Pt is aaox3  Pt in no acute distress  Resp: CTAB  CVS: S1S2(+)  ABD: bowel sounds (+), soft, non distended, no rebound, no guarding, no rigidity, no skin changes to exam. (+) mild tenderness to exam of lower quadrants, no pain out of proportion to exam. NGT demonstrates appropriate bilious output  EXT: no calf tenderness or edema to exam b/l, on VTE prophylaxis  Skin: no skin changes to exam

## 2018-07-23 NOTE — PROGRESS NOTE ADULT - ASSESSMENT
history of lymphoma being treated on the Capac, S/P 5 cycles R CHOP  now with pancytopenia, questionable small bowel obstruction,possible pneumonia    after review of chart patient appears clinically stable/improved  Neupogen has been started, she is getting IV fluids  WBC, hemoglobin, platelets better than yesterday    Plan continue present care, continue Neupogen  Check iron B12 and folate

## 2018-07-23 NOTE — PROGRESS NOTE ADULT - ASSESSMENT
A/P:  SBO  Neutropenic pt, neutropenia precautions  Very mild superior endplate compression fx  NPO, IV hydration, ngt decompression  Monitor bowel function return  Medical comorbidities of new onset A. fib 6/28/18 s/p MARGIE cardioversion 6/29/18, s/p ILR placement, non hodgkins lymphoma on chemo, NIDDM, CKD, GERD, HTN, HLD  GI/DVT prophylaxis  Pain control  Serial abd exams   Hospitalist on consult for medical management  Oncology on consult  Spine surgery on consult  Cont current care and meds  F/U labs, radiologic studies  Pt aware of and agrees with all of the above

## 2018-07-23 NOTE — PROGRESS NOTE ADULT - ASSESSMENT
83 Y old female with multiple medical problems, on chemo for lymphoma s/p 5 cycles of R CHOP last chemo completed 7/11, multiple SBOs in the past presented with few hours of crampy abdominal pain on 7/21 with associated vomiting, no gas, 2 bms. Here noted with pancytopenia, no fever, CT abd/pelvis with SBO, xray with LLL possible PNA was eval by surgery and NGT placed. Reports abd pain and intermittent cough.      1. LLL PNA/SBO/pancytopenia/lymphoma/PCN allergy  - slowly improving, counts better, some GI function  - on IV cefepime [id number] for gnr resistant coverage renally dosed #2  - continue with antibiotic coverage  - tolerating abx well so far; no side effects noted  - plan for repeat xray to assess bowel  - NGT/bowel rest for SBO - surgery following  - monitor temps  - supportive care    2. other issues -c are per medicine

## 2018-07-23 NOTE — PROGRESS NOTE ADULT - SUBJECTIVE AND OBJECTIVE BOX
Chief Complaint: abdominal pain    HPI:     83 Y old female with  NH lymphome s/p 4 RCHOP , last chemo 1.5 weeks ago,  A fib on Eliquis, MV valvular disease, GERD , recurrent SBO in the past admitted to surgical service with few hours of crampy abdominal pian, she is nauseous and had 2 bowel movements today but not passing gas. She induced vomiting once. No fever no dysuria.  Son at bedside who translating for the patient , patient prefer to be translated by the son . Seems depressed, generalized weakness, reports lower back pain, denies falls.    7/22 - pt seen and examined, + abd and lower back pain, afebrile, denies CP, palpitations, dizziness, fever, chills, reports no HA, + dyspepsia   7/23 - pt seen and examined, feels beteer, denies CP, cough, + abdominal pain, + Bm today, Passing the gas   REVIEW OF SYSTEMS:  All other review of systems is negative unless indicated above    PAST MEDICAL & SURGICAL HISTORY:  CKD (chronic kidney disease) stage 3, GFR 30-59 ml/min  Anemia, unspecified type  GERD (gastroesophageal reflux disease)  High cholesterol  Diabetes  HTN (hypertension)  H/O: hysterectomy  S/P cholecystectomy  S/P appendectomy    Social history - denies smoking, ETOH use, denies IVDU  T(C): 36.9 (07-23-18 @ 15:35), Max: 38.1 (07-22-18 @ 20:21)  T(F): 98.4 (07-23-18 @ 15:35), Max: 100.6 (07-22-18 @ 20:21)  HR: 82 (07-23-18 @ 15:35) (74 - 90)  BP: 120/45 (07-23-18 @ 15:35) (120/45 - 128/45)  RR: 18 (07-23-18 @ 15:35) (18 - 18)  SpO2: 92% (07-23-18 @ 15:35) (92% - 95%)  Wt(kg): --  CAPILLARY BLOOD GLUCOSE      POCT Blood Glucose.: 76 mg/dL (23 Jul 2018 17:02)  POCT Blood Glucose.: 84 mg/dL (23 Jul 2018 11:55)  POCT Blood Glucose.: 93 mg/dL (23 Jul 2018 06:24)    POCT Blood Glucose.: 156 mg/dL (22 Jul 2018 06:52)  POCT Blood Glucose.: 130 mg/dL (22 Jul 2018 02:33)        PHYSICAL EXAM:    Constitutional: NAD, awake and alert, well-developed  HEENT: PERR, EOMI, Normal Hearing, MMM  Neck: Soft and supple, No LAD, No JVD + NGT   Respiratory: Breath sounds are clear bilaterally, No wheezing, rales or rhonchi  Cardiovascular: S1 and S2, irregular rate and rhythm, +  Murmurs, gallops or rubs  Gastrointestinal: Bowel Sounds present, soft, nontender, nondistended, no guarding, no rebound  Extremities: No peripheral edema  Vascular: 2+ peripheral pulses  Neurological: A/O x 3, no focal deficits  Musculoskeletal: 5/5 strength b/l upper and lower extremities  Skin: No rashes    Medications:  MEDICATIONS  (STANDING):  dextrose 5%. 1000 milliLiter(s) (50 mL/Hr) IV Continuous <Continuous>  dextrose 50% Injectable 12.5 Gram(s) IV Push once  dextrose 50% Injectable 25 Gram(s) IV Push once  dextrose 50% Injectable 25 Gram(s) IV Push once  heparin  Injectable 5000 Unit(s) SubCutaneous every 12 hours  insulin lispro (HumaLOG) corrective regimen sliding scale   SubCutaneous three times a day before meals  lactated ringers. 1000 milliLiter(s) (100 mL/Hr) IV Continuous <Continuous>  pantoprazole  Injectable 40 milliGRAM(s) IV Push daily      Labs: All Labs Reviewed:  07-23    140  |  104  |  8   ----------------------------<  79  3.6   |  28  |  0.90    Ca    8.7      23 Jul 2018 06:34  Mg     1.4     07-23    TPro  5.5<L>  /  Alb  2.4<L>  /  TBili  0.4  /  DBili  x   /  AST  4<L>  /  ALT  11<L>  /  AlkPhos  52  07-22                            8.4    1.15  )-----------( 78       ( 23 Jul 2018 06:34 )             27.2       CARDIAC MARKERS ( 22 Jul 2018 03:04 )  <0.015 ng/mL / x     / 14 U/L / x     / x            LIVER FUNCTIONS - ( 22 Jul 2018 03:04 )  Alb: 2.4 g/dL / Pro: 5.5 gm/dL / ALK PHOS: 52 U/L / ALT: 11 U/L / AST: 4 U/L / GGT: x             PT/INR - ( 21 Jul 2018 19:26 )   PT: 15.0 sec;   INR: 1.38 ratio         PTT - ( 21 Jul 2018 19:26 )  PTT:27.6 sec                            7.7    0.67  )-----------( 49       ( 22 Jul 2018 03:04 )             24.0     07-22    140  |  104  |  9   ----------------------------<  112<H>  3.2<L>   |  30  |  0.84    Ca    7.9<L>      22 Jul 2018 03:04    TPro  5.5<L>  /  Alb  2.4<L>  /  TBili  0.4  /  DBili  x   /  AST  4<L>  /  ALT  11<L>  /  AlkPhos  52  07-22    PT/INR - ( 21 Jul 2018 19:26 )   PT: 15.0 sec;   INR: 1.38 ratio         PTT - ( 21 Jul 2018 19:26 )  PTT:27.6 sec      Blood Culture:     < from: 12 Lead ECG (07.22.18 @ 10:01) >  r Rate 78 BPM    Atrial Rate 78 BPM    P-R Interval 174 ms    QRS Duration 84 ms    Q-T Interval 406 ms    QTC Calculation(Bezet) 462 ms    P Axis 28 degrees    R Axis 20 degrees    T Axis 50 degrees    Diagnosis Line Normal sinus rhythm  Normal ECG  When compared with ECG of 02-JUL-2018 01:52,  No significant change was found      < from: Xray Small Bowel Series (07.23.18 @ 11:10) >  Resolution of small bowel obstruction. Transit of contrast into the colon   within 2 hours.      < end of copied text >  < end of copied text >  RADIOLOGY/EKG:  < from: Xray Kidney Ureter Bladder (07.22.18 @ 22:11) >  MPRESSION:    Enteric tube with tip in the body of the stomach, unchanged.    Persistent mildly distended air-filled loops of small bowel within the   lower abdomen/pelvis, consistent with small bowel obstruction visualized   on abdominal/pelvic CT of 7/22/18.      < end of copied text >    < from: Xray Chest 1 View-PORTABLE IMMEDIATE (07.22.18 @ 10:18) >  IMPRESSION: There is a right-sided Mediport with its tip in the SVC.   There is a cardiac loop recorder present. There is an NG tube present   with its tip at the GE junction and its proximal port within the distal   esophagus. This should be advanced. There is mild left lower lobe   opacification compatible with atelectasis or pneumonia.    < end of copied text >    < from: CT Abdomen and Pelvis w/ Oral Cont and w/ IV Cont (07.21.18 @ 20:56) >  LOWER CHEST: Within normal limits.    HEPATOBILIARY: Cholecystectomy. Fatty liver and hepatomegaly. No biliary   ductal dilation. Small volume perihepatic ascites.  PANCREAS: Atrophic.  SPLEEN: Splenomegaly with numerous hypodense splenic lesions, likely   related to history of lymphoma.   ADRENALS: Within normal limits.    KIDNEYS/URETERS/BLADDER: Bilateral renalatrophy and left upper pole   renal cyst. No hydronephrosis or urinary calculus. Bladder within normal   limits.  REPRODUCTIVE ORGANS: Hysterectomy. Small amount of free pelvic fluid.    BOWEL/PERITONEUM: No pneumoperitoneum. Small bowel obstruction with   transition point in the right lower quadrant, similar configuration to   the prior study. Mild mesenteric and interloop edema. The appendix is   surgically absent. Scattered colonic diverticula.    VESSELS:  Mild calcific atherosclerosis. No abdominal aortic aneurysm.  LYMPHATICS/RETROPERITONEUM: No lymphadenopathy. No retroperitoneal   hematoma.      SOFT TISSUES: Laparotomy scar.  BONES: Generalized osteopenia and spinal degenerative changes. Mild   compression deformity of the superior endplate of L1, new from the prior   examination.    IMPRESSION: Small bowel obstruction with transition  point in the right lower quadrant, similar in configuration to the prior   study. Mild mesenteric edema and ascites.    Mild compression deformityof the superior endplate of L1, new from the   prior examination.    < end of copied text >  MEDICATIONS  (STANDING):  dextrose 5%. 1000 milliLiter(s) (50 mL/Hr) IV Continuous <Continuous>  dextrose 50% Injectable 12.5 Gram(s) IV Push once  dextrose 50% Injectable 25 Gram(s) IV Push once  dextrose 50% Injectable 25 Gram(s) IV Push once  heparin  Injectable 5000 Unit(s) SubCutaneous every 12 hours  insulin lispro (HumaLOG) corrective regimen sliding scale   SubCutaneous three times a day before meals  lactated ringers. 1000 milliLiter(s) (100 mL/Hr) IV Continuous <Continuous>  levoFLOXacin IVPB      pantoprazole  Injectable 40 milliGRAM(s) IV Push daily    MEDICATIONS  (PRN):  dextrose 40% Gel 15 Gram(s) Oral once PRN Blood Glucose LESS THAN 70 milliGRAM(s)/deciliter  glucagon  Injectable 1 milliGRAM(s) IntraMuscular once PRN Glucose LESS THAN 70 milligrams/deciliter  morphine  - Injectable 2 milliGRAM(s) IV Push every 6 hours PRN Severe Pain (7 - 10)

## 2018-07-23 NOTE — PROGRESS NOTE ADULT - SUBJECTIVE AND OBJECTIVE BOX
Date of service: 18 @ 13:31    83 Y old female with multiple medical problems, on chemo for lymphoma s/p 5 cycles of R CHOP last chemo completed , multiple SBOs in the past presented with few hours of crampy abdominal pain on  with associated vomiting, no gas, 2 bms. Here noted with pancytopenia, no fever, Ct abd/pelvis with SBO, xray with LLL possible PNA was eval by surgery and NGT placed. Reports abd pain and intermittent cough.      pt seen and examined   had small BM this am  NGT clamped  tmax 100.6      ROS: no fever or chills; denies dizziness, no HA,  no abdominal pain, no diarrhea or constipation; no dysuria, no urinary frequency, no legs pain, no rashes    MEDICATIONS  (STANDING):  cefepime  Injectable. 1000 milliGRAM(s) IV Push every 12 hours  cefepime  Injectable.      dextrose 5%. 1000 milliLiter(s) (50 mL/Hr) IV Continuous <Continuous>  dextrose 50% Injectable 12.5 Gram(s) IV Push once  dextrose 50% Injectable 25 Gram(s) IV Push once  dextrose 50% Injectable 25 Gram(s) IV Push once  filgrastim-sndz Injectable 300 MICROGram(s) SubCutaneous daily  heparin  Injectable 5000 Unit(s) SubCutaneous every 12 hours  insulin lispro (HumaLOG) corrective regimen sliding scale   SubCutaneous three times a day before meals  lactated ringers. 1000 milliLiter(s) (100 mL/Hr) IV Continuous <Continuous>  lidocaine   Patch 2 Patch Transdermal daily  pantoprazole  Injectable 40 milliGRAM(s) IV Push daily  sodium ferric gluconate complex IVPB 125 milliGRAM(s) IV Intermittent <User Schedule>      Vital Signs Last 24 Hrs  T(C): 36.6 (2018 06:26), Max: 38.1 (2018 20:21)  T(F): 97.8 (2018 06:26), Max: 100.6 (2018 20:21)  HR: 74 (2018 06:26) (74 - 90)  BP: 126/37 (2018 06:26) (126/37 - 128/47)  BP(mean): --  RR: 18 (2018 06:26) (16 - 18)  SpO2: 95% (2018 06:26) (95% - 97%)    PE:  Constitutional: frail looking, NGT in place  HEENT: NC/AT, EOMI, PERRLA, conjunctivae clear; ears and nose atraumatic; pharynx benign  Neck: supple; thyroid not palpable  Back: no tenderness  Respiratory: decreased breath sounds  Cardiovascular: S1S2 regular, no murmurs  Abdomen: soft, not tender, not distended, positive BS; liver and spleen WNL  Genitourinary: no suprapubic tenderness  Lymphatic: no LN palpable  Musculoskeletal: no muscle tenderness, no joint swelling or tenderness  Extremities: no pedal edema  Neurological/ Psychiatric:  moving all extremities  Skin: no rashes; no palpable lesions    Labs: all available labs reviewed                        8.4    1.15  )-----------( 78       ( 2018 06:34 )             27.2     07-23    140  |  104  |  8   ----------------------------<  79  3.6   |  28  |  0.90    Ca    8.7      2018 06:34  Mg     1.4     07-23    TPro  5.5<L>  /  Alb  2.4<L>  /  TBili  0.4  /  DBili  x   /  AST  4<L>  /  ALT  11<L>  /  AlkPhos  52  07-22        Urinalysis Basic - ( 2018 11:00 )    Color: Yellow / Appearance: Clear / S.005 / pH: x  Gluc: x / Ketone: Negative  / Bili: Negative / Urobili: 1 mg/dL   Blood: x / Protein: 30 mg/dL / Nitrite: Negative   Leuk Esterase: Trace / RBC: 6-10 /HPF / WBC 0-2   Sq Epi: x / Non Sq Epi: Few / Bacteria: Occasional    Culture - Urine (18 @ 11:00)    Specimen Source: .Urine Clean Catch (Midstream)    Culture Results:   No growth          Radiology: all available radiological tests reviewed    Advanced directives addressed: full resuscitation

## 2018-07-23 NOTE — PROGRESS NOTE ADULT - ASSESSMENT
Assessment/Plan:    * NH Lymphoma on chemo RCHOP s/p 4 th cycle 1.5 week ago   * Pancytopenia -  Neutropenia , severe anemia, thrombocytopenia   * Suspected LLL PNA  vs atelectasis   - oncology consult for need of Neupogen,  epogen   - c/w Levofloxacin for infection prophylactics--> IV cefepime 1 gm q12h  - given neutropenia - pan cultures, neutropenia precaution  - ID consult   - started on Neupogen  by oncology     * Abdominal pain due to SBO , resolving   - as per surgery   - NPO, NGT suction  - off AC , on heparin sc as per surgery    * A fib  takes at home Eliquis, with MV regurgitation, severe as per son   - HR well control, may need IV BB if HR > 110  - restart amiodarone 200 once can tolerate PO  - off Eliquis   - CHADVASC score 3  - no need for bridging  - may need cardiac clearance prior surgery   - restart AC as soon as ok with surgery   - hold lasix  ( no h/o HF)     * Anemia acute on chronic s/p chemo   Iron deficiency   - start IV iron    - monitor  - consider transfusion if <7   - oncology evaluation    * Lower abdominal pain due to acute mild L1 compression  - pain management - lidoderm, tylenol  - PT , consider ortho consult  - add vit D once tolerates PO   - osteoporosis work-up and management as o/p     * GERD  - c/w ppi     * Hypokalemia  * Hypomagnesemia   - replace, check Mg in am  - replace Mg       DVT PPX: heparin    Advance Directive: Full code     Disposition: Thank you for consult, will follow with you.     Time Span: 70 min

## 2018-07-23 NOTE — PROGRESS NOTE ADULT - SUBJECTIVE AND OBJECTIVE BOX
Patient seen earlier this morning  abdominal discomfort decreased, had passed flatus, no bowel movement    Physical examination well-developed well-nourished female fatigue in no acute distress  NG tube in place  Lungs decreased breath sounds on right  Abdomen soft mildly tender bowel sounds present,without rebound  Extremities without pitting edema    wBC 1.15, hemoglobin 8.4, hematocrit 27.2, platelets 78

## 2018-07-24 LAB
ANION GAP SERPL CALC-SCNC: 15 MMOL/L — SIGNIFICANT CHANGE UP (ref 5–17)
BUN SERPL-MCNC: 8 MG/DL — SIGNIFICANT CHANGE UP (ref 7–23)
CALCIUM SERPL-MCNC: 8.9 MG/DL — SIGNIFICANT CHANGE UP (ref 8.5–10.1)
CHLORIDE SERPL-SCNC: 104 MMOL/L — SIGNIFICANT CHANGE UP (ref 96–108)
CO2 SERPL-SCNC: 22 MMOL/L — SIGNIFICANT CHANGE UP (ref 22–31)
CREAT SERPL-MCNC: 0.81 MG/DL — SIGNIFICANT CHANGE UP (ref 0.5–1.3)
GLUCOSE BLDC GLUCOMTR-MCNC: 104 MG/DL — HIGH (ref 70–99)
GLUCOSE BLDC GLUCOMTR-MCNC: 107 MG/DL — HIGH (ref 70–99)
GLUCOSE BLDC GLUCOMTR-MCNC: 57 MG/DL — LOW (ref 70–99)
GLUCOSE BLDC GLUCOMTR-MCNC: 68 MG/DL — LOW (ref 70–99)
GLUCOSE BLDC GLUCOMTR-MCNC: 81 MG/DL — SIGNIFICANT CHANGE UP (ref 70–99)
GLUCOSE SERPL-MCNC: 49 MG/DL — LOW (ref 70–99)
HCT VFR BLD CALC: 26.2 % — LOW (ref 34.5–45)
HGB BLD-MCNC: 8 G/DL — LOW (ref 11.5–15.5)
MAGNESIUM SERPL-MCNC: 1.7 MG/DL — SIGNIFICANT CHANGE UP (ref 1.6–2.6)
MCHC RBC-ENTMCNC: 27.2 PG — SIGNIFICANT CHANGE UP (ref 27–34)
MCHC RBC-ENTMCNC: 30.5 GM/DL — LOW (ref 32–36)
MCV RBC AUTO: 89.1 FL — SIGNIFICANT CHANGE UP (ref 80–100)
NRBC # BLD: 2 /100 WBCS — HIGH (ref 0–0)
PLATELET # BLD AUTO: 68 K/UL — LOW (ref 150–400)
POTASSIUM SERPL-MCNC: 3 MMOL/L — LOW (ref 3.5–5.3)
POTASSIUM SERPL-SCNC: 3 MMOL/L — LOW (ref 3.5–5.3)
RBC # BLD: 2.94 M/UL — LOW (ref 3.8–5.2)
RBC # FLD: 19.2 % — HIGH (ref 10.3–14.5)
SODIUM SERPL-SCNC: 141 MMOL/L — SIGNIFICANT CHANGE UP (ref 135–145)
WBC # BLD: 4.94 K/UL — SIGNIFICANT CHANGE UP (ref 3.8–10.5)
WBC # FLD AUTO: 4.94 K/UL — SIGNIFICANT CHANGE UP (ref 3.8–10.5)

## 2018-07-24 PROCEDURE — 99232 SBSQ HOSP IP/OBS MODERATE 35: CPT

## 2018-07-24 RX ORDER — POTASSIUM CHLORIDE 20 MEQ
40 PACKET (EA) ORAL ONCE
Qty: 0 | Refills: 0 | Status: COMPLETED | OUTPATIENT
Start: 2018-07-24 | End: 2018-07-24

## 2018-07-24 RX ORDER — DEXTROSE MONOHYDRATE, SODIUM CHLORIDE, AND POTASSIUM CHLORIDE 50; .745; 4.5 G/1000ML; G/1000ML; G/1000ML
1000 INJECTION, SOLUTION INTRAVENOUS
Qty: 0 | Refills: 0 | Status: DISCONTINUED | OUTPATIENT
Start: 2018-07-24 | End: 2018-07-25

## 2018-07-24 RX ORDER — HYDROCORTISONE 1 %
1 OINTMENT (GRAM) TOPICAL ONCE
Qty: 0 | Refills: 0 | Status: COMPLETED | OUTPATIENT
Start: 2018-07-24 | End: 2018-07-24

## 2018-07-24 RX ADMIN — Medication 40 MILLIEQUIVALENT(S): at 11:26

## 2018-07-24 RX ADMIN — Medication 1 APPLICATION(S): at 23:57

## 2018-07-24 RX ADMIN — SODIUM CHLORIDE 100 MILLILITER(S): 9 INJECTION, SOLUTION INTRAVENOUS at 05:28

## 2018-07-24 RX ADMIN — Medication 110 MILLIGRAM(S): at 17:33

## 2018-07-24 RX ADMIN — LIDOCAINE 2 PATCH: 4 CREAM TOPICAL at 23:57

## 2018-07-24 RX ADMIN — HEPARIN SODIUM 5000 UNIT(S): 5000 INJECTION INTRAVENOUS; SUBCUTANEOUS at 05:27

## 2018-07-24 RX ADMIN — CEFEPIME 1000 MILLIGRAM(S): 1 INJECTION, POWDER, FOR SOLUTION INTRAMUSCULAR; INTRAVENOUS at 05:28

## 2018-07-24 RX ADMIN — PANTOPRAZOLE SODIUM 40 MILLIGRAM(S): 20 TABLET, DELAYED RELEASE ORAL at 11:26

## 2018-07-24 RX ADMIN — Medication 300 MICROGRAM(S): at 11:26

## 2018-07-24 RX ADMIN — DEXTROSE MONOHYDRATE, SODIUM CHLORIDE, AND POTASSIUM CHLORIDE 75 MILLILITER(S): 50; .745; 4.5 INJECTION, SOLUTION INTRAVENOUS at 18:54

## 2018-07-24 RX ADMIN — LIDOCAINE 2 PATCH: 4 CREAM TOPICAL at 11:26

## 2018-07-24 RX ADMIN — CEFEPIME 1000 MILLIGRAM(S): 1 INJECTION, POWDER, FOR SOLUTION INTRAMUSCULAR; INTRAVENOUS at 17:33

## 2018-07-24 NOTE — PROGRESS NOTE ADULT - SUBJECTIVE AND OBJECTIVE BOX
Chief Complaint: abdominal pain    HPI:     83 Y old female with  NH lymphome s/p 4 RCHOP , last chemo 1.5 weeks ago,  A fib on Eliquis, MV valvular disease, GERD , recurrent SBO in the past admitted to surgical service with few hours of crampy abdominal pian, she is nauseous and had 2 bowel movements today but not passing gas. She induced vomiting once. No fever no dysuria.  Son at bedside who translating for the patient , patient prefer to be translated by the son . Seems depressed, generalized weakness, reports lower back pain, denies falls.    7/22 - pt seen and examined, + abd and lower back pain, afebrile, denies CP, palpitations, dizziness, fever, chills, reports no HA, + dyspepsia   7/23 - pt seen and examined, feels beteer, denies CP, cough, + abdominal pain, + Bm today, Passing the gas   7/24 - pt seen and examined, + cough, feels better, + BM, tolerates PO diet , afebrile   REVIEW OF SYSTEMS:  All other review of systems is negative unless indicated above    PAST MEDICAL & SURGICAL HISTORY:  CKD (chronic kidney disease) stage 3, GFR 30-59 ml/min  Anemia, unspecified type  GERD (gastroesophageal reflux disease)  High cholesterol  Diabetes  HTN (hypertension)  H/O: hysterectomy  S/P cholecystectomy  S/P appendectomy    Social history - denies smoking, ETOH use, denies IVDU  T(C): 36.8 (07-24-18 @ 14:49), Max: 37.2 (07-23-18 @ 20:51)  T(F): 98.2 (07-24-18 @ 14:49), Max: 98.9 (07-23-18 @ 20:51)  HR: 79 (07-24-18 @ 14:49) (79 - 83)  BP: 124/40 (07-24-18 @ 14:49) (122/44 - 128/40)  RR: 16 (07-24-18 @ 14:49) (16 - 18)  SpO2: 94% (07-24-18 @ 14:49) (93% - 99%)  Wt(kg): --  CAPILLARY BLOOD GLUCOSE  POCT Blood Glucose.: 107 mg/dL (24 Jul 2018 12:11)  POCT Blood Glucose.: 81 mg/dL (24 Jul 2018 06:47)  POCT Blood Glucose.: 68 mg/dL (24 Jul 2018 06:24)  POCT Blood Glucose.: 57 mg/dL (24 Jul 2018 06:02)      POCT Blood Glucose.: 76 mg/dL (23 Jul 2018 17:02)  POCT Blood Glucose.: 84 mg/dL (23 Jul 2018 11:55)  POCT Blood Glucose.: 93 mg/dL (23 Jul 2018 06:24)    POCT Blood Glucose.: 156 mg/dL (22 Jul 2018 06:52)  POCT Blood Glucose.: 130 mg/dL (22 Jul 2018 02:33)        PHYSICAL EXAM:    Constitutional: NAD, awake and alert, well-developed  HEENT: PERR, EOMI, Normal Hearing, MMM  Neck: Soft and supple, No LAD, No JVD + NGT   Respiratory: Breath sounds are clear bilaterally, No wheezing, rales or rhonchi  Cardiovascular: S1 and S2, irregular rate and rhythm, +  Murmurs, gallops or rubs  Gastrointestinal: Bowel Sounds present, soft, nontender, nondistended, no guarding, no rebound  Extremities: No peripheral edema  Vascular: 2+ peripheral pulses  Neurological: A/O x 3, no focal deficits  Musculoskeletal: 5/5 strength b/l upper and lower extremities  Skin: No rashes    Medications:  MEDICATIONS  (STANDING):  dextrose 5%. 1000 milliLiter(s) (50 mL/Hr) IV Continuous <Continuous>  dextrose 50% Injectable 12.5 Gram(s) IV Push once  dextrose 50% Injectable 25 Gram(s) IV Push once  dextrose 50% Injectable 25 Gram(s) IV Push once  heparin  Injectable 5000 Unit(s) SubCutaneous every 12 hours  insulin lispro (HumaLOG) corrective regimen sliding scale   SubCutaneous three times a day before meals  lactated ringers. 1000 milliLiter(s) (100 mL/Hr) IV Continuous <Continuous>  pantoprazole  Injectable 40 milliGRAM(s) IV Push daily      Labs: All Labs Reviewed:  07-24    141  |  104  |  8   ----------------------------<  49<L>  3.0<L>   |  22  |  0.81    Ca    8.9      24 Jul 2018 05:51  Mg     1.7     07-24                        8.0    4.94  )-----------( 68       ( 24 Jul 2018 05:51 )             26.2     07-23    140  |  104  |  8   ----------------------------<  79  3.6   |  28  |  0.90    Ca    8.7      23 Jul 2018 06:34  Mg     1.4     07-23    TPro  5.5<L>  /  Alb  2.4<L>  /  TBili  0.4  /  DBili  x   /  AST  4<L>  /  ALT  11<L>  /  AlkPhos  52  07-22                            8.4    1.15  )-----------( 78       ( 23 Jul 2018 06:34 )             27.2       CARDIAC MARKERS ( 22 Jul 2018 03:04 )  <0.015 ng/mL / x     / 14 U/L / x     / x            LIVER FUNCTIONS - ( 22 Jul 2018 03:04 )  Alb: 2.4 g/dL / Pro: 5.5 gm/dL / ALK PHOS: 52 U/L / ALT: 11 U/L / AST: 4 U/L / GGT: x             PT/INR - ( 21 Jul 2018 19:26 )   PT: 15.0 sec;   INR: 1.38 ratio         PTT - ( 21 Jul 2018 19:26 )  PTT:27.6 sec                            7.7    0.67  )-----------( 49       ( 22 Jul 2018 03:04 )             24.0     07-22    140  |  104  |  9   ----------------------------<  112<H>  3.2<L>   |  30  |  0.84    Ca    7.9<L>      22 Jul 2018 03:04    TPro  5.5<L>  /  Alb  2.4<L>  /  TBili  0.4  /  DBili  x   /  AST  4<L>  /  ALT  11<L>  /  AlkPhos  52  07-22    PT/INR - ( 21 Jul 2018 19:26 )   PT: 15.0 sec;   INR: 1.38 ratio         PTT - ( 21 Jul 2018 19:26 )  PTT:27.6 sec      Blood Culture:     < from: 12 Lead ECG (07.22.18 @ 10:01) >  r Rate 78 BPM    Atrial Rate 78 BPM    P-R Interval 174 ms    QRS Duration 84 ms    Q-T Interval 406 ms    QTC Calculation(Bezet) 462 ms    P Axis 28 degrees    R Axis 20 degrees    T Axis 50 degrees    Diagnosis Line Normal sinus rhythm  Normal ECG  When compared with ECG of 02-JUL-2018 01:52,  No significant change was found      < from: Xray Small Bowel Series (07.23.18 @ 11:10) >  Resolution of small bowel obstruction. Transit of contrast into the colon   within 2 hours.      < end of copied text >  < end of copied text >  RADIOLOGY/EKG:  < from: Xray Kidney Ureter Bladder (07.22.18 @ 22:11) >  MPRESSION:    Enteric tube with tip in the body of the stomach, unchanged.    Persistent mildly distended air-filled loops of small bowel within the   lower abdomen/pelvis, consistent with small bowel obstruction visualized   on abdominal/pelvic CT of 7/22/18.      < end of copied text >    < from: Xray Chest 1 View-PORTABLE IMMEDIATE (07.22.18 @ 10:18) >  IMPRESSION: There is a right-sided Mediport with its tip in the SVC.   There is a cardiac loop recorder present. There is an NG tube present   with its tip at the GE junction and its proximal port within the distal   esophagus. This should be advanced. There is mild left lower lobe   opacification compatible with atelectasis or pneumonia.    < end of copied text >    < from: CT Abdomen and Pelvis w/ Oral Cont and w/ IV Cont (07.21.18 @ 20:56) >  LOWER CHEST: Within normal limits.    HEPATOBILIARY: Cholecystectomy. Fatty liver and hepatomegaly. No biliary   ductal dilation. Small volume perihepatic ascites.  PANCREAS: Atrophic.  SPLEEN: Splenomegaly with numerous hypodense splenic lesions, likely   related to history of lymphoma.   ADRENALS: Within normal limits.    KIDNEYS/URETERS/BLADDER: Bilateral renalatrophy and left upper pole   renal cyst. No hydronephrosis or urinary calculus. Bladder within normal   limits.  REPRODUCTIVE ORGANS: Hysterectomy. Small amount of free pelvic fluid.    BOWEL/PERITONEUM: No pneumoperitoneum. Small bowel obstruction with   transition point in the right lower quadrant, similar configuration to   the prior study. Mild mesenteric and interloop edema. The appendix is   surgically absent. Scattered colonic diverticula.    VESSELS:  Mild calcific atherosclerosis. No abdominal aortic aneurysm.  LYMPHATICS/RETROPERITONEUM: No lymphadenopathy. No retroperitoneal   hematoma.      SOFT TISSUES: Laparotomy scar.  BONES: Generalized osteopenia and spinal degenerative changes. Mild   compression deformity of the superior endplate of L1, new from the prior   examination.    IMPRESSION: Small bowel obstruction with transition  point in the right lower quadrant, similar in configuration to the prior   study. Mild mesenteric edema and ascites.    Mild compression deformityof the superior endplate of L1, new from the   prior examination.    < end of copied text >  MEDICATIONS  (STANDING):  dextrose 5%. 1000 milliLiter(s) (50 mL/Hr) IV Continuous <Continuous>  dextrose 50% Injectable 12.5 Gram(s) IV Push once  dextrose 50% Injectable 25 Gram(s) IV Push once  dextrose 50% Injectable 25 Gram(s) IV Push once  heparin  Injectable 5000 Unit(s) SubCutaneous every 12 hours  insulin lispro (HumaLOG) corrective regimen sliding scale   SubCutaneous three times a day before meals  lactated ringers. 1000 milliLiter(s) (100 mL/Hr) IV Continuous <Continuous>  levoFLOXacin IVPB      pantoprazole  Injectable 40 milliGRAM(s) IV Push daily    MEDICATIONS  (PRN):  dextrose 40% Gel 15 Gram(s) Oral once PRN Blood Glucose LESS THAN 70 milliGRAM(s)/deciliter  glucagon  Injectable 1 milliGRAM(s) IntraMuscular once PRN Glucose LESS THAN 70 milligrams/deciliter  morphine  - Injectable 2 milliGRAM(s) IV Push every 6 hours PRN Severe Pain (7 - 10)

## 2018-07-24 NOTE — PROGRESS NOTE ADULT - ASSESSMENT
History of lymphoma S/P several cycles chemotherapy with R CHOP  admitted with SBO and  pancytopenia  SBO appears to have resolved  Receiving Neupogen and neutropenia now resolved  Anemia multifactorial including iron deficiency, receiving parenteral iron    Plan: if WBC elevated tomorrow DC Neupogen  Continue parenteral iron/and oral folate  We'll have outpatient follow-up with her oncologist on the [place].

## 2018-07-24 NOTE — PROGRESS NOTE ADULT - SUBJECTIVE AND OBJECTIVE BOX
pain has resolved  NG tube removed ;on clear liquids    WBC 4.9, Hgb 8.0, HCT 26.2, platelet 68  July 23 iron 12,percent saturation 13%

## 2018-07-24 NOTE — PROGRESS NOTE ADULT - SUBJECTIVE AND OBJECTIVE BOX
CC:Patient is a 83y old  Female who presents with a chief complaint of Abdominal pain, unable to pass gas. (22 Jul 2018 00:25)      Subjective:  Pt on neutropenic precautions    Pt seen and examined at bedside with chaperone, . Pt is AAOx3, pt in no acute distress. Pt states improved and resolving abd pain since admission. Pt denied c/o fever, chills, chest pain, SOB, N/V/D, extremity pain or dysfunction, hemoptysis, hematemesis, hematuria, hematochexia, headache, diplopia, vertigo, dizzyness. Pt states (+) diet tolerance, (+) void, (+) oob, (+) bowel function of flatus and bm    ROS:  abd pain, otherwise negative ROS      Vital Signs Last 24 Hrs  T(C): 36.7 (24 Jul 2018 04:40), Max: 37.2 (23 Jul 2018 20:51)  T(F): 98.1 (24 Jul 2018 04:40), Max: 98.9 (23 Jul 2018 20:51)  HR: 82 (24 Jul 2018 04:40) (82 - 83)  BP: 122/44 (24 Jul 2018 04:40) (120/45 - 128/40)  BP(mean): --  RR: 18 (24 Jul 2018 04:40) (18 - 18)  SpO2: 99% (24 Jul 2018 04:40) (92% - 99%)    Labs:                                8.0    4.94  )-----------( 68       ( 24 Jul 2018 05:51 )             26.2     CBC Full  -  ( 24 Jul 2018 05:51 )  WBC Count : 4.94 K/uL  Hemoglobin : 8.0 g/dL  Hematocrit : 26.2 %  Platelet Count - Automated : 68 K/uL  Mean Cell Volume : 89.1 fl  Mean Cell Hemoglobin : 27.2 pg  Mean Cell Hemoglobin Concentration : 30.5 gm/dL  Auto Neutrophil # : x  Auto Lymphocyte # : x  Auto Monocyte # : x  Auto Eosinophil # : x  Auto Basophil # : x  Auto Neutrophil % : x  Auto Lymphocyte % : x  Auto Monocyte % : x  Auto Eosinophil % : x  Auto Basophil % : x    07-24    141  |  104  |  8   ----------------------------<  49<L>  3.0<L>   |  22  |  0.81    Ca    8.9      24 Jul 2018 05:51  Mg     1.7     07-24              Meds:  cefepime  Injectable. 1000 milliGRAM(s) IV Push every 12 hours  cefepime  Injectable.      dextrose 40% Gel 15 Gram(s) Oral once PRN  dextrose 5%. 1000 milliLiter(s) IV Continuous <Continuous>  dextrose 50% Injectable 12.5 Gram(s) IV Push once  dextrose 50% Injectable 25 Gram(s) IV Push once  dextrose 50% Injectable 25 Gram(s) IV Push once  filgrastim-sndz Injectable 300 MICROGram(s) SubCutaneous daily  glucagon  Injectable 1 milliGRAM(s) IntraMuscular once PRN  heparin  Injectable 5000 Unit(s) SubCutaneous every 12 hours  insulin lispro (HumaLOG) corrective regimen sliding scale   SubCutaneous three times a day before meals  lactated ringers. 1000 milliLiter(s) IV Continuous <Continuous>  lidocaine   Patch 2 Patch Transdermal daily  morphine  - Injectable 2 milliGRAM(s) IV Push every 6 hours PRN  pantoprazole  Injectable 40 milliGRAM(s) IV Push daily  potassium chloride    Tablet ER 40 milliEquivalent(s) Oral once  sodium ferric gluconate complex IVPB 125 milliGRAM(s) IV Intermittent <User Schedule>      Radiology:  < from: Xray Small Bowel Series (07.23.18 @ 11:10) >  EXAM:  SMALL BOWEL                            PROCEDURE DATE:  07/23/2018          INTERPRETATION:  Clinical information: Evaluate small bowel obstruction    TECHNIQUE: A limited small bowel series was performed following   administration of 100 ccof water-soluble contrast. Radiographs were   obtained at 0 and 2 hours.    COMPARISON: CT abdomen/pelvis July 21, 2018    FINDINGS:  radiograph of the abdomen demonstrates an enteric tube in   the fundus of the stomach. Contrast from recent CT is seen in the colon.   Gas and stool are also seen within the colon.    On the 2 hour radiograph, contrast is seen opacifying multiple small   bowel loops and the right colon. There is no free intraperitoneal air.    IMPRESSION:    Resolution of small bowel obstruction. Transit of contrast into the colon   within 2 hours.                ANAI TOUSSAINT   This document has been electronically signed. Jul 23 2018  1:54PM    < end of copied text >      Physical exam:  Pt is aaox3  Pt in no acute distress  Resp: CTAB  CVS: S1S2(+)  ABD: bowel sounds (+), soft, non distended, no rebound, no guarding, no rigidity, no skin changes to exam. Mild lower abd tenderness to deep palpation exam  EXT: no calf tenderness or edema to exam b/l, on VTE prophylaxis  Skin: no adverse skin changes to exam

## 2018-07-24 NOTE — PROGRESS NOTE ADULT - ASSESSMENT
Assessment/Plan:    * NH Lymphoma on chemo RCHOP s/p 4 th cycle 1.5 week ago   * Pancytopenia -  Neutropenia , severe anemia, thrombocytopenia , due to chemotherapy   * Suspected LLL PNA , POA, probable GNR in immunocompromised host  - oncology consult for need of Neupogen,  epogen   - c/w Levofloxacin for infection prophylactics--> IV cefepime 1 gm q12h  - given neutropenia - pan cultures, neutropenia precaution  - ID consult   - started on Neupogen  by oncology     * Abdominal pain due to SBO , resolving   - as per surgery   - NPO, NGT suction--> diet   - off AC , on heparin sc as per surgery    * A fib  takes at home Eliquis, with MV regurgitation, severe as per son   - HR well control, may need IV BB if HR > 110  - restart amiodarone 200 once can tolerate PO  - off Eliquis   - CHADVASC score 3  - no need for bridging  - may need cardiac clearance prior surgery   - restart AC as soon as ok with surgery   - hold lasix  ( no h/o HF)     * Anemia acute on chronic s/p chemo   Iron deficiency   - start IV iron    - monitor  - consider transfusion if <7   - oncology evaluation    * Lower abdominal pain due to acute mild L1 compression  - pain management - lidoderm, tylenol  - PT , consider ortho consult  - add vit D once tolerates PO   - osteoporosis work-up and management as o/p     * GERD  - c/w ppi     * Hypokalemia  * Hypomagnesemia   - replace, check Mg in am  - replace Mg     * Hypoglycemia due to poor nutrition , SBO   - change IV fluids to D5NS with K   - monitor     DVT PPX: heparin    Advance Directive: Full code     Disposition: Thank you for consult, will follow with you.     Time Span: 70 min

## 2018-07-24 NOTE — CDI QUERY NOTE - NSCDIOTHERTXTBX_GEN_ALL_CORE_HH
Patient admitted with abdominal pain n/v, found to have SBO  Patient with non-hodgkins lymphoma and on chemo  Pancytopenia -  Neutropenia , severe anemia, thrombocytopenia documented.  Neutropenia secondary to chemo documented.    WBC= 0.58 > 0.67  H/H = 8.8/ 27.0 > 7.7/24.0  PLT= 50 > 49    Please clarify the underlying etiology of the pancytopenia ?  1) Pancytopenia secondary to chemotherapy ?  2) Pancytopenia secondary to underlying lymphoma ?  3) Other etiology of the pancytopenia ? Please clarify

## 2018-07-24 NOTE — CDI QUERY NOTE - NSCDIOTHERTXTBX2_GEN_ALL_CORE_FT
Suspected pneumonia has been documented.  CXR on 7/22 revealed : There is mild left lower lobe   opacification compatible with atelectasis or pneumonia.  Placed on IV Cefepime q12h as of 7/22.  Prior to admission patient had abdominal pain, with n/v  Patient with non-hodgkins lymphoma on chemo.    Please clarify the likely or suspected type of pneumonia being treated and necessitating the use of IV cefepime ?  Please also indicate if the Pneumonia was POA or not ?  1) Suspected Aspiration pneumonia , POA ?  2) Probable GNR pneumonia in immunocompromised host, POA ?  3) Other Suspected type of pneumonia ? Please clarify

## 2018-07-24 NOTE — PROGRESS NOTE ADULT - ASSESSMENT
A/P:  SBO, resolving/resolved  Neutropenic pt, neutropenia precautions  Very mild superior endplate compression fx  Diet advancement as tolerated  Monitor bowel function  Medical comorbidities of new onset A. fib 6/28/18 s/p MARGIE cardioversion 6/29/18, s/p ILR placement, non hodgkins lymphoma on chemo, NIDDM, CKD, GERD, HTN, HLD  GI/DVT prophylaxis  Pain control  Serial abd exams   Hospitalist on consult for medical management  Oncology on consult  Spine surgery on consult  Cont current care and meds  No surgical intervention for pt while severely neutropenic  Pt aware of and agrees with all of the above

## 2018-07-25 ENCOUNTER — TRANSCRIPTION ENCOUNTER (OUTPATIENT)
Age: 83
End: 2018-07-25

## 2018-07-25 VITALS
TEMPERATURE: 99 F | SYSTOLIC BLOOD PRESSURE: 131 MMHG | RESPIRATION RATE: 18 BRPM | DIASTOLIC BLOOD PRESSURE: 46 MMHG | HEART RATE: 78 BPM | OXYGEN SATURATION: 97 %

## 2018-07-25 LAB
ANION GAP SERPL CALC-SCNC: 6 MMOL/L — SIGNIFICANT CHANGE UP (ref 5–17)
BUN SERPL-MCNC: 4 MG/DL — LOW (ref 7–23)
CALCIUM SERPL-MCNC: 8.1 MG/DL — LOW (ref 8.5–10.1)
CHLORIDE SERPL-SCNC: 105 MMOL/L — SIGNIFICANT CHANGE UP (ref 96–108)
CO2 SERPL-SCNC: 28 MMOL/L — SIGNIFICANT CHANGE UP (ref 22–31)
CREAT SERPL-MCNC: 0.98 MG/DL — SIGNIFICANT CHANGE UP (ref 0.5–1.3)
GLUCOSE BLDC GLUCOMTR-MCNC: 122 MG/DL — HIGH (ref 70–99)
GLUCOSE BLDC GLUCOMTR-MCNC: 135 MG/DL — HIGH (ref 70–99)
GLUCOSE BLDC GLUCOMTR-MCNC: 267 MG/DL — HIGH (ref 70–99)
GLUCOSE SERPL-MCNC: 200 MG/DL — HIGH (ref 70–99)
HCT VFR BLD CALC: 26.4 % — LOW (ref 34.5–45)
HGB BLD-MCNC: 8.1 G/DL — LOW (ref 11.5–15.5)
MAGNESIUM SERPL-MCNC: 1.4 MG/DL — LOW (ref 1.6–2.6)
MCHC RBC-ENTMCNC: 27.3 PG — SIGNIFICANT CHANGE UP (ref 27–34)
MCHC RBC-ENTMCNC: 30.7 GM/DL — LOW (ref 32–36)
MCV RBC AUTO: 88.9 FL — SIGNIFICANT CHANGE UP (ref 80–100)
NRBC # BLD: 0 /100 WBCS — SIGNIFICANT CHANGE UP (ref 0–0)
PLATELET # BLD AUTO: 110 K/UL — LOW (ref 150–400)
POTASSIUM SERPL-MCNC: 3.3 MMOL/L — LOW (ref 3.5–5.3)
POTASSIUM SERPL-SCNC: 3.3 MMOL/L — LOW (ref 3.5–5.3)
RBC # BLD: 2.97 M/UL — LOW (ref 3.8–5.2)
RBC # FLD: 19.4 % — HIGH (ref 10.3–14.5)
SODIUM SERPL-SCNC: 139 MMOL/L — SIGNIFICANT CHANGE UP (ref 135–145)
WBC # BLD: 16.9 K/UL — HIGH (ref 3.8–10.5)
WBC # FLD AUTO: 16.9 K/UL — HIGH (ref 3.8–10.5)

## 2018-07-25 PROCEDURE — 99238 HOSP IP/OBS DSCHRG MGMT 30/<: CPT

## 2018-07-25 RX ORDER — CEFUROXIME AXETIL 250 MG
1 TABLET ORAL
Qty: 12 | Refills: 0 | OUTPATIENT
Start: 2018-07-25 | End: 2018-07-30

## 2018-07-25 RX ORDER — POTASSIUM CHLORIDE 20 MEQ
40 PACKET (EA) ORAL EVERY 4 HOURS
Qty: 0 | Refills: 0 | Status: COMPLETED | OUTPATIENT
Start: 2018-07-25 | End: 2018-07-25

## 2018-07-25 RX ORDER — MAGNESIUM SULFATE 500 MG/ML
2 VIAL (ML) INJECTION ONCE
Qty: 0 | Refills: 0 | Status: COMPLETED | OUTPATIENT
Start: 2018-07-25 | End: 2018-07-25

## 2018-07-25 RX ORDER — PANTOPRAZOLE SODIUM 20 MG/1
40 TABLET, DELAYED RELEASE ORAL
Qty: 0 | Refills: 0 | Status: DISCONTINUED | OUTPATIENT
Start: 2018-07-26 | End: 2018-07-25

## 2018-07-25 RX ADMIN — Medication 50 GRAM(S): at 11:59

## 2018-07-25 RX ADMIN — Medication 40 MILLIEQUIVALENT(S): at 17:09

## 2018-07-25 RX ADMIN — Medication 40 MILLIEQUIVALENT(S): at 12:00

## 2018-07-25 RX ADMIN — HEPARIN SODIUM 5000 UNIT(S): 5000 INJECTION INTRAVENOUS; SUBCUTANEOUS at 11:59

## 2018-07-25 RX ADMIN — PANTOPRAZOLE SODIUM 40 MILLIGRAM(S): 20 TABLET, DELAYED RELEASE ORAL at 12:00

## 2018-07-25 RX ADMIN — CEFEPIME 1000 MILLIGRAM(S): 1 INJECTION, POWDER, FOR SOLUTION INTRAMUSCULAR; INTRAVENOUS at 05:10

## 2018-07-25 RX ADMIN — MORPHINE SULFATE 2 MILLIGRAM(S): 50 CAPSULE, EXTENDED RELEASE ORAL at 02:47

## 2018-07-25 RX ADMIN — LIDOCAINE 2 PATCH: 4 CREAM TOPICAL at 12:09

## 2018-07-25 RX ADMIN — DEXTROSE MONOHYDRATE, SODIUM CHLORIDE, AND POTASSIUM CHLORIDE 75 MILLILITER(S): 50; .745; 4.5 INJECTION, SOLUTION INTRAVENOUS at 06:43

## 2018-07-25 RX ADMIN — Medication 3: at 12:01

## 2018-07-25 RX ADMIN — Medication 110 MILLIGRAM(S): at 17:09

## 2018-07-25 RX ADMIN — CEFEPIME 1000 MILLIGRAM(S): 1 INJECTION, POWDER, FOR SOLUTION INTRAMUSCULAR; INTRAVENOUS at 17:09

## 2018-07-25 NOTE — PROGRESS NOTE ADULT - SUBJECTIVE AND OBJECTIVE BOX
Chief Complaint: abdominal pain    HPI:     83 Y old female with  NH lymphome s/p 4 RCHOP , last chemo 1.5 weeks ago,  A fib on Eliquis, MV valvular disease, GERD , recurrent SBO in the past admitted to surgical service with few hours of crampy abdominal pian, she is nauseous and had 2 bowel movements today but not passing gas. She induced vomiting once. No fever no dysuria.  Son at bedside who translating for the patient , patient prefer to be translated by the son . Seems depressed, generalized weakness, reports lower back pain, denies falls.    7/22 - pt seen and examined, + abd and lower back pain, afebrile, denies CP, palpitations, dizziness, fever, chills, reports no HA, + dyspepsia   7/23 - pt seen and examined, feels beteer, denies CP, cough, + abdominal pain, + Bm today, Passing the gas   7/24 - pt seen and examined, + cough, feels better, + BM, tolerates PO diet , afebrile   7/25 - pt seen and examined, + cough, abd pain overnight, afebrile, tolerates diet   REVIEW OF SYSTEMS:  All other review of systems is negative unless indicated above    PAST MEDICAL & SURGICAL HISTORY:  CKD (chronic kidney disease) stage 3, GFR 30-59 ml/min  Anemia, unspecified type  GERD (gastroesophageal reflux disease)  High cholesterol  Diabetes  HTN (hypertension)  H/O: hysterectomy  S/P cholecystectomy  S/P appendectomy    Social history - denies smoking, ETOH use, denies IVDU  T(C): 37.1 (07-25-18 @ 11:28), Max: 37.2 (07-24-18 @ 20:58)  T(F): 98.8 (07-25-18 @ 11:28), Max: 99 (07-24-18 @ 20:58)  HR: 78 (07-25-18 @ 11:28) (78 - 84)  BP: 131/46 (07-25-18 @ 11:28) (120/44 - 135/56)  RR: 18 (07-25-18 @ 11:28) (17 - 18)  SpO2: 97% (07-25-18 @ 11:28) (95% - 97%)  Wt(kg): --  CAPILLARY BLOOD GLUCOSE      POCT Blood Glucose.: 267 mg/dL (25 Jul 2018 11:43)  POCT Blood Glucose.: 122 mg/dL (25 Jul 2018 07:20)  POCT Blood Glucose.: 104 mg/dL (24 Jul 2018 17:32)      PHYSICAL EXAM:    Constitutional: NAD, awake and alert, well-developed  HEENT: PERR, EOMI, Normal Hearing, MMM  Neck: Soft and supple, No LAD, No JVD + NGT   Respiratory: Breath sounds are clear bilaterally, No wheezing, rales or rhonchi  Cardiovascular: S1 and S2, irregular rate and rhythm, +  Murmurs, gallops or rubs  Gastrointestinal: Bowel Sounds present, soft, nontender, nondistended, no guarding, no rebound  Extremities: No peripheral edema  Vascular: 2+ peripheral pulses  Neurological: A/O x 3, no focal deficits  Musculoskeletal: 5/5 strength b/l upper and lower extremities  Skin: No rashes    Medications:  MEDICATIONS  (STANDING):  dextrose 5%. 1000 milliLiter(s) (50 mL/Hr) IV Continuous <Continuous>  dextrose 50% Injectable 12.5 Gram(s) IV Push once  dextrose 50% Injectable 25 Gram(s) IV Push once  dextrose 50% Injectable 25 Gram(s) IV Push once  heparin  Injectable 5000 Unit(s) SubCutaneous every 12 hours  insulin lispro (HumaLOG) corrective regimen sliding scale   SubCutaneous three times a day before meals  lactated ringers. 1000 milliLiter(s) (100 mL/Hr) IV Continuous <Continuous>  pantoprazole  Injectable 40 milliGRAM(s) IV Push daily      Labs: All Labs Reviewed:  07-25    139  |  105  |  4<L>  ----------------------------<  200<H>  3.3<L>   |  28  |  0.98    Ca    8.1<L>      25 Jul 2018 10:00  Mg     1.4     07-25                              8.1    16.90 )-----------( 110      ( 25 Jul 2018 10:00 )             26.4                       07-24    141  |  104  |  8   ----------------------------<  49<L>  3.0<L>   |  22  |  0.81    Ca    8.9      24 Jul 2018 05:51  Mg     1.7     07-24                        8.0    4.94  )-----------( 68       ( 24 Jul 2018 05:51 )             26.2     07-23    140  |  104  |  8   ----------------------------<  79  3.6   |  28  |  0.90    Ca    8.7      23 Jul 2018 06:34  Mg     1.4     07-23    TPro  5.5<L>  /  Alb  2.4<L>  /  TBili  0.4  /  DBili  x   /  AST  4<L>  /  ALT  11<L>  /  AlkPhos  52  07-22                            8.4    1.15  )-----------( 78       ( 23 Jul 2018 06:34 )             27.2       CARDIAC MARKERS ( 22 Jul 2018 03:04 )  <0.015 ng/mL / x     / 14 U/L / x     / x            LIVER FUNCTIONS - ( 22 Jul 2018 03:04 )  Alb: 2.4 g/dL / Pro: 5.5 gm/dL / ALK PHOS: 52 U/L / ALT: 11 U/L / AST: 4 U/L / GGT: x             PT/INR - ( 21 Jul 2018 19:26 )   PT: 15.0 sec;   INR: 1.38 ratio         PTT - ( 21 Jul 2018 19:26 )  PTT:27.6 sec                            7.7    0.67  )-----------( 49       ( 22 Jul 2018 03:04 )             24.0     07-22    140  |  104  |  9   ----------------------------<  112<H>  3.2<L>   |  30  |  0.84    Ca    7.9<L>      22 Jul 2018 03:04    TPro  5.5<L>  /  Alb  2.4<L>  /  TBili  0.4  /  DBili  x   /  AST  4<L>  /  ALT  11<L>  /  AlkPhos  52  07-22    PT/INR - ( 21 Jul 2018 19:26 )   PT: 15.0 sec;   INR: 1.38 ratio         PTT - ( 21 Jul 2018 19:26 )  PTT:27.6 sec      Blood Culture:     < from: 12 Lead ECG (07.22.18 @ 10:01) >  r Rate 78 BPM    Atrial Rate 78 BPM    P-R Interval 174 ms    QRS Duration 84 ms    Q-T Interval 406 ms    QTC Calculation(Bezet) 462 ms    P Axis 28 degrees    R Axis 20 degrees    T Axis 50 degrees    Diagnosis Line Normal sinus rhythm  Normal ECG  When compared with ECG of 02-JUL-2018 01:52,  No significant change was found      < from: Xray Small Bowel Series (07.23.18 @ 11:10) >  Resolution of small bowel obstruction. Transit of contrast into the colon   within 2 hours.      < end of copied text >  < end of copied text >  RADIOLOGY/EKG:  < from: Xray Kidney Ureter Bladder (07.22.18 @ 22:11) >  MPRESSION:    Enteric tube with tip in the body of the stomach, unchanged.    Persistent mildly distended air-filled loops of small bowel within the   lower abdomen/pelvis, consistent with small bowel obstruction visualized   on abdominal/pelvic CT of 7/22/18.      < end of copied text >    < from: Xray Chest 1 View-PORTABLE IMMEDIATE (07.22.18 @ 10:18) >  IMPRESSION: There is a right-sided Mediport with its tip in the SVC.   There is a cardiac loop recorder present. There is an NG tube present   with its tip at the GE junction and its proximal port within the distal   esophagus. This should be advanced. There is mild left lower lobe   opacification compatible with atelectasis or pneumonia.    < end of copied text >    < from: CT Abdomen and Pelvis w/ Oral Cont and w/ IV Cont (07.21.18 @ 20:56) >  LOWER CHEST: Within normal limits.    HEPATOBILIARY: Cholecystectomy. Fatty liver and hepatomegaly. No biliary   ductal dilation. Small volume perihepatic ascites.  PANCREAS: Atrophic.  SPLEEN: Splenomegaly with numerous hypodense splenic lesions, likely   related to history of lymphoma.   ADRENALS: Within normal limits.    KIDNEYS/URETERS/BLADDER: Bilateral renalatrophy and left upper pole   renal cyst. No hydronephrosis or urinary calculus. Bladder within normal   limits.  REPRODUCTIVE ORGANS: Hysterectomy. Small amount of free pelvic fluid.    BOWEL/PERITONEUM: No pneumoperitoneum. Small bowel obstruction with   transition point in the right lower quadrant, similar configuration to   the prior study. Mild mesenteric and interloop edema. The appendix is   surgically absent. Scattered colonic diverticula.    VESSELS:  Mild calcific atherosclerosis. No abdominal aortic aneurysm.  LYMPHATICS/RETROPERITONEUM: No lymphadenopathy. No retroperitoneal   hematoma.      SOFT TISSUES: Laparotomy scar.  BONES: Generalized osteopenia and spinal degenerative changes. Mild   compression deformity of the superior endplate of L1, new from the prior   examination.    IMPRESSION: Small bowel obstruction with transition  point in the right lower quadrant, similar in configuration to the prior   study. Mild mesenteric edema and ascites.    Mild compression deformityof the superior endplate of L1, new from the   prior examination.    < end of copied text >  MEDICATIONS  (STANDING):  dextrose 5%. 1000 milliLiter(s) (50 mL/Hr) IV Continuous <Continuous>  dextrose 50% Injectable 12.5 Gram(s) IV Push once  dextrose 50% Injectable 25 Gram(s) IV Push once  dextrose 50% Injectable 25 Gram(s) IV Push once  heparin  Injectable 5000 Unit(s) SubCutaneous every 12 hours  insulin lispro (HumaLOG) corrective regimen sliding scale   SubCutaneous three times a day before meals  lactated ringers. 1000 milliLiter(s) (100 mL/Hr) IV Continuous <Continuous>  levoFLOXacin IVPB      pantoprazole  Injectable 40 milliGRAM(s) IV Push daily    MEDICATIONS  (PRN):  dextrose 40% Gel 15 Gram(s) Oral once PRN Blood Glucose LESS THAN 70 milliGRAM(s)/deciliter  glucagon  Injectable 1 milliGRAM(s) IntraMuscular once PRN Glucose LESS THAN 70 milligrams/deciliter  morphine  - Injectable 2 milliGRAM(s) IV Push every 6 hours PRN Severe Pain (7 - 10) Chief Complaint: abdominal pain    HPI:     83 Y old female with  NH lymphome s/p 4 RCHOP , last chemo 1.5 weeks ago,  A fib on Eliquis, MV valvular disease, GERD , recurrent SBO in the past admitted to surgical service with few hours of crampy abdominal pian, she is nauseous and had 2 bowel movements today but not passing gas. She induced vomiting once. No fever no dysuria.  Son at bedside who translating for the patient , patient prefer to be translated by the son . Seems depressed, generalized weakness, reports lower back pain, denies falls.    7/22 - pt seen and examined, + abd and lower back pain, afebrile, denies CP, palpitations, dizziness, fever, chills, reports no HA, + dyspepsia   7/23 - pt seen and examined, feels beteer, denies CP, cough, + abdominal pain, + Bm today, Passing the gas   7/24 - pt seen and examined, + cough, feels better, + BM, tolerates PO diet , afebrile   7/25 - pt seen and examined, + cough, abd pain overnight, afebrile, tolerates diet   REVIEW OF SYSTEMS:  All other review of systems is negative unless indicated above    PAST MEDICAL & SURGICAL HISTORY:  CKD (chronic kidney disease) stage 3, GFR 30-59 ml/min  Anemia, unspecified type  GERD (gastroesophageal reflux disease)  High cholesterol  Diabetes  HTN (hypertension)  H/O: hysterectomy  S/P cholecystectomy  S/P appendectomy    Social history - denies smoking, ETOH use, denies IVDU  T(C): 37.1 (07-25-18 @ 11:28), Max: 37.2 (07-24-18 @ 20:58)  T(F): 98.8 (07-25-18 @ 11:28), Max: 99 (07-24-18 @ 20:58)  HR: 78 (07-25-18 @ 11:28) (78 - 84)  BP: 131/46 (07-25-18 @ 11:28) (120/44 - 135/56)  RR: 18 (07-25-18 @ 11:28) (17 - 18)  SpO2: 97% (07-25-18 @ 11:28) (95% - 97%)  Wt(kg): --CAPILLARY BLOOD GLUCOSE      POCT Blood Glucose.: 267 mg/dL (25 Jul 2018 11:43)  POCT Blood Glucose.: 122 mg/dL (25 Jul 2018 07:20)  POCT Blood Glucose.: 104 mg/dL (24 Jul 2018 17:32)      PHYSICAL EXAM:    Constitutional: NAD, awake and alert, well-developed  HEENT: PERR, EOMI, Normal Hearing, MMM  Neck: Soft and supple, No LAD, No JVD + NGT   Respiratory: Breath sounds are clear bilaterally, No wheezing, rales or rhonchi  Cardiovascular: S1 and S2, irregular rate and rhythm, +  Murmurs, gallops or rubs  Gastrointestinal: Bowel Sounds present, soft, nontender, nondistended, no guarding, no rebound  Extremities: No peripheral edema  Vascular: 2+ peripheral pulses  Neurological: A/O x 3, no focal deficits  Musculoskeletal: 5/5 strength b/l upper and lower extremities  Skin: No rashes    Medications:  MEDICATIONS  (STANDING):  dextrose 5%. 1000 milliLiter(s) (50 mL/Hr) IV Continuous <Continuous>  dextrose 50% Injectable 12.5 Gram(s) IV Push once  dextrose 50% Injectable 25 Gram(s) IV Push once  dextrose 50% Injectable 25 Gram(s) IV Push once  heparin  Injectable 5000 Unit(s) SubCutaneous every 12 hours  insulin lispro (HumaLOG) corrective regimen sliding scale   SubCutaneous three times a day before meals  lactated ringers. 1000 milliLiter(s) (100 mL/Hr) IV Continuous <Continuous>  pantoprazole  Injectable 40 milliGRAM(s) IV Push daily      Labs: All Labs Reviewed:  07-25    139  |  105  |  4<L>  ----------------------------<  200<H>  3.3<L>   |  28  |  0.98    Ca    8.1<L>      25 Jul 2018 10:00  Mg     1.4     07-25                              8.1    16.90 )-----------( 110      ( 25 Jul 2018 10:00 )             26.4                       07-24    141  |  104  |  8   ----------------------------<  49<L>  3.0<L>   |  22  |  0.81    Ca    8.9      24 Jul 2018 05:51  Mg     1.7     07-24                        8.0    4.94  )-----------( 68       ( 24 Jul 2018 05:51 )             26.2     07-23    140  |  104  |  8   ----------------------------<  79  3.6   |  28  |  0.90    Ca    8.7      23 Jul 2018 06:34  Mg     1.4     07-23    TPro  5.5<L>  /  Alb  2.4<L>  /  TBili  0.4  /  DBili  x   /  AST  4<L>  /  ALT  11<L>  /  AlkPhos  52  07-22                            8.4    1.15  )-----------( 78       ( 23 Jul 2018 06:34 )             27.2       CARDIAC MARKERS ( 22 Jul 2018 03:04 )  <0.015 ng/mL / x     / 14 U/L / x     / x            LIVER FUNCTIONS - ( 22 Jul 2018 03:04 )  Alb: 2.4 g/dL / Pro: 5.5 gm/dL / ALK PHOS: 52 U/L / ALT: 11 U/L / AST: 4 U/L / GGT: x             PT/INR - ( 21 Jul 2018 19:26 )   PT: 15.0 sec;   INR: 1.38 ratio         PTT - ( 21 Jul 2018 19:26 )  PTT:27.6 sec                            7.7    0.67  )-----------( 49       ( 22 Jul 2018 03:04 )             24.0     07-22    140  |  104  |  9   ----------------------------<  112<H>  3.2<L>   |  30  |  0.84    Ca    7.9<L>      22 Jul 2018 03:04    TPro  5.5<L>  /  Alb  2.4<L>  /  TBili  0.4  /  DBili  x   /  AST  4<L>  /  ALT  11<L>  /  AlkPhos  52  07-22    PT/INR - ( 21 Jul 2018 19:26 )   PT: 15.0 sec;   INR: 1.38 ratio         PTT - ( 21 Jul 2018 19:26 )  PTT:27.6 sec      Blood Culture:     < from: 12 Lead ECG (07.22.18 @ 10:01) >  r Rate 78 BPM    Atrial Rate 78 BPM    P-R Interval 174 ms    QRS Duration 84 ms    Q-T Interval 406 ms    QTC Calculation(Bezet) 462 ms    P Axis 28 degrees    R Axis 20 degrees    T Axis 50 degrees    Diagnosis Line Normal sinus rhythm  Normal ECG  When compared with ECG of 02-JUL-2018 01:52,  No significant change was found      < from: Xray Small Bowel Series (07.23.18 @ 11:10) >  Resolution of small bowel obstruction. Transit of contrast into the colon   within 2 hours.      < end of copied text >  < end of copied text >  RADIOLOGY/EKG:  < from: Xray Kidney Ureter Bladder (07.22.18 @ 22:11) >  MPRESSION:    Enteric tube with tip in the body of the stomach, unchanged.    Persistent mildly distended air-filled loops of small bowel within the   lower abdomen/pelvis, consistent with small bowel obstruction visualized   on abdominal/pelvic CT of 7/22/18.      < end of copied text >    < from: Xray Chest 1 View-PORTABLE IMMEDIATE (07.22.18 @ 10:18) >  IMPRESSION: There is a right-sided Mediport with its tip in the SVC.   There is a cardiac loop recorder present. There is an NG tube present   with its tip at the GE junction and its proximal port within the distal   esophagus. This should be advanced. There is mild left lower lobe   opacification compatible with atelectasis or pneumonia.    < end of copied text >    < from: CT Abdomen and Pelvis w/ Oral Cont and w/ IV Cont (07.21.18 @ 20:56) >  LOWER CHEST: Within normal limits.    HEPATOBILIARY: Cholecystectomy. Fatty liver and hepatomegaly. No biliary   ductal dilation. Small volume perihepatic ascites.  PANCREAS: Atrophic.  SPLEEN: Splenomegaly with numerous hypodense splenic lesions, likely   related to history of lymphoma.   ADRENALS: Within normal limits.    KIDNEYS/URETERS/BLADDER: Bilateral renalatrophy and left upper pole   renal cyst. No hydronephrosis or urinary calculus. Bladder within normal   limits.  REPRODUCTIVE ORGANS: Hysterectomy. Small amount of free pelvic fluid.    BOWEL/PERITONEUM: No pneumoperitoneum. Small bowel obstruction with   transition point in the right lower quadrant, similar configuration to   the prior study. Mild mesenteric and interloop edema. The appendix is   surgically absent. Scattered colonic diverticula.    VESSELS:  Mild calcific atherosclerosis. No abdominal aortic aneurysm.  LYMPHATICS/RETROPERITONEUM: No lymphadenopathy. No retroperitoneal   hematoma.      SOFT TISSUES: Laparotomy scar.  BONES: Generalized osteopenia and spinal degenerative changes. Mild   compression deformity of the superior endplate of L1, new from the prior   examination.    IMPRESSION: Small bowel obstruction with transition  point in the right lower quadrant, similar in configuration to the prior   study. Mild mesenteric edema and ascites.    Mild compression deformityof the superior endplate of L1, new from the   prior examination.    < end of copied text >  MEDICATIONS  (STANDING):  dextrose 5%. 1000 milliLiter(s) (50 mL/Hr) IV Continuous <Continuous>  dextrose 50% Injectable 12.5 Gram(s) IV Push once  dextrose 50% Injectable 25 Gram(s) IV Push once  dextrose 50% Injectable 25 Gram(s) IV Push once  heparin  Injectable 5000 Unit(s) SubCutaneous every 12 hours  insulin lispro (HumaLOG) corrective regimen sliding scale   SubCutaneous three times a day before meals  lactated ringers. 1000 milliLiter(s) (100 mL/Hr) IV Continuous <Continuous>  levoFLOXacin IVPB      pantoprazole  Injectable 40 milliGRAM(s) IV Push daily    MEDICATIONS  (PRN):  dextrose 40% Gel 15 Gram(s) Oral once PRN Blood Glucose LESS THAN 70 milliGRAM(s)/deciliter  glucagon  Injectable 1 milliGRAM(s) IntraMuscular once PRN Glucose LESS THAN 70 milligrams/deciliter  morphine  - Injectable 2 milliGRAM(s) IV Push every 6 hours PRN Severe Pain (7 - 10)

## 2018-07-25 NOTE — PROGRESS NOTE ADULT - ASSESSMENT
83 Y old female with multiple medical problems, on chemo for lymphoma s/p 5 cycles of R CHOP last chemo completed 7/11, multiple SBOs in the past presented with few hours of crampy abdominal pain on 7/21 with associated vomiting, no gas, 2 bms. Here noted with pancytopenia, no fever, CT abd/pelvis with SBO, xray with LLL possible PNA was eval by surgery and NGT placed. Reports abd pain and intermittent cough.      1. LLL PNA/SBO/pancytopenia/lymphoma/PCN allergy  - slowly improving, counts better, SBO resolved  - on IV cefepime [id number] for gnr resistant coverage renally dosed #3  - can switch to ceftin [id number] to complete 7 day course  - continue with antibiotic coverage  - tolerating abx well so far; no side effects noted  - monitor temps  - supportive care    2. other issues -c are per medicine

## 2018-07-25 NOTE — DISCHARGE NOTE ADULT - PATIENT PORTAL LINK FT
You can access the [facility] Patient Portal, offered by [facility], by registering with the following website: [email or web address]

## 2018-07-25 NOTE — PROGRESS NOTE ADULT - ASSESSMENT
Assessment/Plan:    * NH Lymphoma on chemo RCHOP s/p 4 th cycle 1.5 week ago   * Pancytopenia -  Neutropenia , severe anemia, thrombocytopenia , due to chemotherapy   * Suspected LLL PNA , POA, probable GNR in immunocompromised host  - oncology consult for need of Neupogen,  epogen   - c/w Levofloxacin for infection prophylactics--> IV cefepime 1 gm q12h  - given neutropenia - pan cultures, neutropenia precaution  - ID consult   - started on Neupogen  by oncology     * Abdominal pain due to SBO , resolving   - as per surgery   - NPO, NGT suction--> diet   - off AC , on heparin sc as per surgery    * A fib  takes at home Eliquis, with MV regurgitation, severe as per son   - HR well control, may need IV BB if HR > 110  - restart amiodarone 200 once can tolerate PO  - off Eliquis   - CHADVASC score 3  - no need for bridging  - may need cardiac clearance prior surgery   - restart AC as soon as ok with surgery   - hold lasix  ( no h/o HF)     * Anemia acute on chronic s/p chemo   Iron deficiency   - start IV iron    - monitor  - consider transfusion if <7   - oncology evaluation    * Lower abdominal pain due to acute mild L1 compression  - pain management - lidoderm, tylenol  - PT , consider ortho consult  - add vit D once tolerates PO   - osteoporosis work-up and management as o/p     * GERD  - c/w ppi     * Hypokalemia  * Hypomagnesemia   - replace, check Mg in am  - replace Mg     * Hypoglycemia due to poor nutrition , SBO   - change IV fluids to D5NS with K   - monitor     DVT PPX: heparin    Advance Directive: Full code     Disposition: Thank you for consult, will follow with you.     Time Span: 70 min Assessment/Plan:    * NH Lymphoma on chemo RCHOP s/p 4 th cycle 1.5 week ago   * Pancytopenia -  Neutropenia , severe anemia, thrombocytopenia , due to chemotherapy   * Suspected LLL PNA , POA, probable GNR in immunocompromised host  - oncology consult for need of Neupogen,  epogen   - c/w Levofloxacin for infection prophylactics--> IV cefepime 1 gm q12h--> ceftin for 3 more days  - given neutropenia - pan cultures, neutropenia precaution  - ID consult   - started on Neupogen  by oncology     * Abdominal pain due to SBO , resolving   - as per surgery   - NPO, NGT suction--> diet   - off AC , on heparin sc as per surgery    * A fib  takes at home Eliquis, with MV regurgitation, severe as per son   - HR well control, may need IV BB if HR > 110  - restart amiodarone 200 once can tolerate PO  - off Eliquis   - CHADVASC score 3  - no need for bridging  - may need cardiac clearance prior surgery   - restart AC as soon as ok with surgery   - hold lasix  ( no h/o HF)     * Anemia acute on chronic s/p chemo   Iron deficiency   - start IV iron    - monitor  - consider transfusion if <7   - oncology evaluation    * Lower abdominal pain due to acute mild L1 compression  - pain management - lidoderm, tylenol  - PT , consider ortho consult  - add vit D once tolerates PO   - osteoporosis work-up and management as o/p     * GERD  - c/w ppi     * Hypokalemia  * Hypomagnesemia   - replace, check Mg in am  - replace Mg     * Hypoglycemia due to poor nutrition , SBO   - change IV fluids to D5NS with K   - monitor     DVT PPX: heparin    Advance Directive: Full code     Disposition: Thank you for consult, will follow with you.  Medically stable for discharge, complete antibiotics, restart eliquis    Time Span: 70 min

## 2018-07-25 NOTE — DISCHARGE NOTE ADULT - CARE PROVIDER_API CALL
Kayli Carballo), Surgery; Surgical Critical Care  [address] [facility]  [address] [place] [address]  Phone: [phone]  Fax: [phone]

## 2018-07-25 NOTE — PROGRESS NOTE ADULT - PROVIDER SPECIALTY LIST ADULT
Heme/Onc
Heme/Onc
Hospitalist
Infectious Disease
Surgery
Infectious Disease
Surgery

## 2018-07-25 NOTE — DISCHARGE NOTE ADULT - HOSPITAL COURSE
Patient is a 83y old  Female who presents with a chief complaint of Abdominal pain, unable to pass gas.  Patient was found to have an SBO that was treated with an NGT and is now resolved.  SHe was also found to have pneumonia and treated with abx for which she will receive a prescription to go home.

## 2018-07-25 NOTE — DISCHARGE NOTE ADULT - MEDICATION SUMMARY - MEDICATIONS TO TAKE
I will START or STAY ON the medications listed below when I get home from the hospital:    apixaban 2.5 mg oral tablet  -- 1 tab(s) by mouth every 12 hours  -- Indication: For SBO (small bowel obstruction)    atorvastatin 10 mg oral tablet  -- 1 tab(s) by mouth once a day  -- Indication: For SBO (small bowel obstruction)    metoprolol tartrate 25 mg oral tablet  -- 0.5 tab(s) by mouth once a day  -- Indication: For SBO (small bowel obstruction)    cefuroxime 500 mg oral tablet  -- 1 tab(s) by mouth 2 times a day MDD:take 1 tab every 12 hrs until finished  -- Finish all this medication unless otherwise directed by prescriber.  Medication should be taken with plenty of water.  Take with food or milk.    -- Indication: For pneumonia    furosemide 20 mg oral tablet  -- 0.5 milligram(s) by mouth once a day  -- Indication: For SBO (small bowel obstruction)    omeprazole 40 mg oral delayed release capsule  -- 1 cap(s) by mouth once a day  -- Indication: For SBO (small bowel obstruction)

## 2018-07-25 NOTE — DISCHARGE NOTE ADULT - NS AS ACTIVITY OBS
Walking-Outdoors allowed/Stairs allowed/Do not drive or operate machinery/Bathing allowed/Showering allowed/No Heavy lifting/straining/Walking-Indoors allowed

## 2018-07-25 NOTE — PROGRESS NOTE ADULT - SUBJECTIVE AND OBJECTIVE BOX
Date of service: 18 @ 14:06    83 Y old female with multiple medical problems, on chemo for lymphoma s/p 5 cycles of R CHOP last chemo completed , multiple SBOs in the past presented with few hours of crampy abdominal pain on  with associated vomiting, no gas, 2 bms. Here noted with pancytopenia, no fever, Ct abd/pelvis with SBO, xray with LLL possible PNA was eval by surgery and NGT placed. Reports abd pain and intermittent cough.      pt seen and examined   + GI function  feels better  no fevers  no cough    ROS: no fever or chills; denies dizziness, no HA,  no diarrhea or constipation; no dysuria, no urinary frequency, no legs pain, no rashes    MEDICATIONS  (STANDING):  cefepime  Injectable. 1000 milliGRAM(s) IV Push every 12 hours  cefepime  Injectable.      dextrose 5% + sodium chloride 0.9% with potassium chloride 20 mEq/L 1000 milliLiter(s) (75 mL/Hr) IV Continuous <Continuous>  dextrose 5%. 1000 milliLiter(s) (50 mL/Hr) IV Continuous <Continuous>  dextrose 50% Injectable 12.5 Gram(s) IV Push once  dextrose 50% Injectable 25 Gram(s) IV Push once  dextrose 50% Injectable 25 Gram(s) IV Push once  heparin  Injectable 5000 Unit(s) SubCutaneous every 12 hours  insulin lispro (HumaLOG) corrective regimen sliding scale   SubCutaneous three times a day before meals  lidocaine   Patch 2 Patch Transdermal daily  potassium chloride    Tablet ER 40 milliEquivalent(s) Oral every 4 hours  sodium ferric gluconate complex IVPB 125 milliGRAM(s) IV Intermittent <User Schedule>      Vital Signs Last 24 Hrs  T(C): 37.1 (2018 11:28), Max: 37.2 (2018 20:58)  T(F): 98.8 (2018 11:28), Max: 99 (2018 20:58)  HR: 78 (2018 11:28) (78 - 84)  BP: 131/46 (2018 11:28) (120/44 - 135/56)  BP(mean): --  RR: 18 (2018 11:28) (16 - 18)  SpO2: 97% (2018 11:28) (94% - 97%)      PE:  Constitutional: frail looking, NGT in place  HEENT: NC/AT, EOMI, PERRLA, conjunctivae clear; ears and nose atraumatic; pharynx benign  Neck: supple; thyroid not palpable  Back: no tenderness  Respiratory: decreased breath sounds  Cardiovascular: S1S2 regular, no murmurs  Abdomen: soft, not tender, not distended, positive BS; liver and spleen WNL  Genitourinary: no suprapubic tenderness  Lymphatic: no LN palpable  Musculoskeletal: no muscle tenderness, no joint swelling or tenderness  Extremities: no pedal edema  Neurological/ Psychiatric:  moving all extremities  Skin: no rashes; no palpable lesions    Labs: all available labs reviewed                                   8.1    16.90 )-----------( 110      ( 2018 10:00 )             26.4         139  |  105  |  4<L>  ----------------------------<  200<H>  3.3<L>   |  28  |  0.98    Ca    8.1<L>      2018 10:00  Mg     1.4                 Urinalysis Basic - ( 2018 11:00 )    Color: Yellow / Appearance: Clear / S.005 / pH: x  Gluc: x / Ketone: Negative  / Bili: Negative / Urobili: 1 mg/dL   Blood: x / Protein: 30 mg/dL / Nitrite: Negative   Leuk Esterase: Trace / RBC: 6-10 /HPF / WBC 0-2   Sq Epi: x / Non Sq Epi: Few / Bacteria: Occasional    Culture - Urine (18 @ 11:00)    Specimen Source: .Urine Clean Catch (Midstream)    Culture Results:   No growth      Radiology: all available radiological tests reviewed  < from: Xray Small Bowel Series (18 @ 11:10) >    EXAM:  SMALL BOWEL                            PROCEDURE DATE:  2018          INTERPRETATION:  Clinical information: Evaluate small bowel obstruction    TECHNIQUE: A limited small bowel series was performed following   administration of 100 ccof water-soluble contrast. Radiographs were   obtained at 0 and 2 hours.    COMPARISON: CT abdomen/pelvis 2018    FINDINGS:  radiograph of the abdomen demonstrates an enteric tube in   the fundus of the stomach. Contrast from recent CT is seen in the colon.   Gas and stool are also seen within the colon.    On the 2 hour radiograph, contrast is seen opacifying multiple small   bowel loops and the right colon. There is no free intraperitoneal air.    IMPRESSION:    Resolution of small bowel obstruction. Transit of contrast into the colon   within 2 hours.      < end of copied text >    Advanced directives addressed: full resuscitation

## 2018-07-25 NOTE — DISCHARGE NOTE ADULT - CARE PLAN
Principal Discharge DX:	SBO (small bowel obstruction)  Goal:	return to normal activities  Assessment and plan of treatment:	s/p SBO, treated with NGT and resolved

## 2018-07-27 LAB
CULTURE RESULTS: SIGNIFICANT CHANGE UP
CULTURE RESULTS: SIGNIFICANT CHANGE UP
MANUAL DIF COMMENT BLD-IMP: SIGNIFICANT CHANGE UP
MANUAL DIF COMMENT BLD-IMP: SIGNIFICANT CHANGE UP
SPECIMEN SOURCE: SIGNIFICANT CHANGE UP
SPECIMEN SOURCE: SIGNIFICANT CHANGE UP

## 2018-07-30 DIAGNOSIS — C85.90 NON-HODGKIN LYMPHOMA, UNSPECIFIED, UNSPECIFIED SITE: ICD-10-CM

## 2018-07-30 DIAGNOSIS — E78.00 PURE HYPERCHOLESTEROLEMIA, UNSPECIFIED: ICD-10-CM

## 2018-07-30 DIAGNOSIS — Z90.710 ACQUIRED ABSENCE OF BOTH CERVIX AND UTERUS: ICD-10-CM

## 2018-07-30 DIAGNOSIS — D61.810 ANTINEOPLASTIC CHEMOTHERAPY INDUCED PANCYTOPENIA: ICD-10-CM

## 2018-07-30 DIAGNOSIS — E87.6 HYPOKALEMIA: ICD-10-CM

## 2018-07-30 DIAGNOSIS — I12.9 HYPERTENSIVE CHRONIC KIDNEY DISEASE WITH STAGE 1 THROUGH STAGE 4 CHRONIC KIDNEY DISEASE, OR UNSPECIFIED CHRONIC KIDNEY DISEASE: ICD-10-CM

## 2018-07-30 DIAGNOSIS — Z79.84 LONG TERM (CURRENT) USE OF ORAL HYPOGLYCEMIC DRUGS: ICD-10-CM

## 2018-07-30 DIAGNOSIS — I34.0 NONRHEUMATIC MITRAL (VALVE) INSUFFICIENCY: ICD-10-CM

## 2018-07-30 DIAGNOSIS — I48.91 UNSPECIFIED ATRIAL FIBRILLATION: ICD-10-CM

## 2018-07-30 DIAGNOSIS — Z90.49 ACQUIRED ABSENCE OF OTHER SPECIFIED PARTS OF DIGESTIVE TRACT: ICD-10-CM

## 2018-07-30 DIAGNOSIS — R10.9 UNSPECIFIED ABDOMINAL PAIN: ICD-10-CM

## 2018-07-30 DIAGNOSIS — M84.48XA PATHOLOGICAL FRACTURE, OTHER SITE, INITIAL ENCOUNTER FOR FRACTURE: ICD-10-CM

## 2018-07-30 DIAGNOSIS — N18.3 CHRONIC KIDNEY DISEASE, STAGE 3 (MODERATE): ICD-10-CM

## 2018-07-30 DIAGNOSIS — Z88.0 ALLERGY STATUS TO PENICILLIN: ICD-10-CM

## 2018-07-30 DIAGNOSIS — Z92.21 PERSONAL HISTORY OF ANTINEOPLASTIC CHEMOTHERAPY: ICD-10-CM

## 2018-07-30 DIAGNOSIS — E11.649 TYPE 2 DIABETES MELLITUS WITH HYPOGLYCEMIA WITHOUT COMA: ICD-10-CM

## 2018-07-30 DIAGNOSIS — K56.609 UNSPECIFIED INTESTINAL OBSTRUCTION, UNSPECIFIED AS TO PARTIAL VERSUS COMPLETE OBSTRUCTION: ICD-10-CM

## 2018-07-30 DIAGNOSIS — J15.6 PNEUMONIA DUE TO OTHER GRAM-NEGATIVE BACTERIA: ICD-10-CM

## 2018-07-30 DIAGNOSIS — E83.42 HYPOMAGNESEMIA: ICD-10-CM

## 2018-07-30 DIAGNOSIS — E11.22 TYPE 2 DIABETES MELLITUS WITH DIABETIC CHRONIC KIDNEY DISEASE: ICD-10-CM

## 2018-07-30 DIAGNOSIS — K21.9 GASTRO-ESOPHAGEAL REFLUX DISEASE WITHOUT ESOPHAGITIS: ICD-10-CM

## 2018-08-30 NOTE — PATIENT PROFILE ADULT. - ABILITY TO HEAR (WITH HEARING AID OR HEARING APPLIANCE IF NORMALLY USED):
[Weight management counseling provided] : Weight management [Healthy eating counseling provided] : healthy eating [Target Wt Loss Goal ___] : Target weight loss goal [unfilled] lbs [Decrease Portions] : Decrease food portions [Low Salt Diet] : Low salt diet [de-identified] : diabetic counseling-pt will arrange on his own Adequate: hears normal conversation without difficulty

## 2018-11-17 ENCOUNTER — INPATIENT (INPATIENT)
Facility: HOSPITAL | Age: 83
LOS: 1 days | Discharge: HOME HEALTH | End: 2018-11-19
Attending: INTERNAL MEDICINE | Admitting: INTERNAL MEDICINE
Payer: MEDICARE

## 2018-11-17 VITALS — HEIGHT: 64 IN | WEIGHT: 108.91 LBS

## 2018-11-17 DIAGNOSIS — K56.609 UNSPECIFIED INTESTINAL OBSTRUCTION, UNSPECIFIED AS TO PARTIAL VERSUS COMPLETE OBSTRUCTION: ICD-10-CM

## 2018-11-17 DIAGNOSIS — R74.8 ABNORMAL LEVELS OF OTHER SERUM ENZYMES: ICD-10-CM

## 2018-11-17 DIAGNOSIS — C85.90 NON-HODGKIN LYMPHOMA, UNSPECIFIED, UNSPECIFIED SITE: ICD-10-CM

## 2018-11-17 DIAGNOSIS — Z90.49 ACQUIRED ABSENCE OF OTHER SPECIFIED PARTS OF DIGESTIVE TRACT: Chronic | ICD-10-CM

## 2018-11-17 DIAGNOSIS — Z90.710 ACQUIRED ABSENCE OF BOTH CERVIX AND UTERUS: Chronic | ICD-10-CM

## 2018-11-17 DIAGNOSIS — E11.9 TYPE 2 DIABETES MELLITUS WITHOUT COMPLICATIONS: ICD-10-CM

## 2018-11-17 LAB
ALBUMIN SERPL ELPH-MCNC: 3.9 G/DL — SIGNIFICANT CHANGE UP (ref 3.3–5)
ALP SERPL-CCNC: 108 U/L — SIGNIFICANT CHANGE UP (ref 40–120)
ALT FLD-CCNC: 21 U/L — SIGNIFICANT CHANGE UP (ref 12–78)
ANION GAP SERPL CALC-SCNC: 8 MMOL/L — SIGNIFICANT CHANGE UP (ref 5–17)
APPEARANCE UR: CLEAR — SIGNIFICANT CHANGE UP
AST SERPL-CCNC: 18 U/L — SIGNIFICANT CHANGE UP (ref 15–37)
BACTERIA # UR AUTO: ABNORMAL
BASOPHILS # BLD AUTO: 0 K/UL — SIGNIFICANT CHANGE UP (ref 0–0.2)
BASOPHILS NFR BLD AUTO: 0 % — SIGNIFICANT CHANGE UP (ref 0–2)
BILIRUB SERPL-MCNC: 0.6 MG/DL — SIGNIFICANT CHANGE UP (ref 0.2–1.2)
BILIRUB UR-MCNC: NEGATIVE — SIGNIFICANT CHANGE UP
BUN SERPL-MCNC: 28 MG/DL — HIGH (ref 7–23)
CALCIUM SERPL-MCNC: 8.9 MG/DL — SIGNIFICANT CHANGE UP (ref 8.5–10.1)
CHLORIDE SERPL-SCNC: 104 MMOL/L — SIGNIFICANT CHANGE UP (ref 96–108)
CO2 SERPL-SCNC: 28 MMOL/L — SIGNIFICANT CHANGE UP (ref 22–31)
COLOR SPEC: YELLOW — SIGNIFICANT CHANGE UP
COMMENT - URINE: SIGNIFICANT CHANGE UP
CREAT SERPL-MCNC: 1.27 MG/DL — SIGNIFICANT CHANGE UP (ref 0.5–1.3)
DIFF PNL FLD: ABNORMAL
EOSINOPHIL # BLD AUTO: 0 K/UL — SIGNIFICANT CHANGE UP (ref 0–0.5)
EOSINOPHIL NFR BLD AUTO: 0 % — SIGNIFICANT CHANGE UP (ref 0–6)
EPI CELLS # UR: SIGNIFICANT CHANGE UP
GLUCOSE SERPL-MCNC: 110 MG/DL — HIGH (ref 70–99)
GLUCOSE UR QL: NEGATIVE MG/DL — SIGNIFICANT CHANGE UP
HCT VFR BLD CALC: 36 % — SIGNIFICANT CHANGE UP (ref 34.5–45)
HGB BLD-MCNC: 11.3 G/DL — LOW (ref 11.5–15.5)
HYALINE CASTS # UR AUTO: ABNORMAL /LPF
KETONES UR-MCNC: NEGATIVE — SIGNIFICANT CHANGE UP
LACTATE SERPL-SCNC: 0.7 MMOL/L — SIGNIFICANT CHANGE UP (ref 0.7–2)
LEUKOCYTE ESTERASE UR-ACNC: NEGATIVE — SIGNIFICANT CHANGE UP
LIDOCAIN IGE QN: 79 U/L — SIGNIFICANT CHANGE UP (ref 73–393)
LYMPHOCYTES # BLD AUTO: 0.72 K/UL — LOW (ref 1–3.3)
LYMPHOCYTES # BLD AUTO: 22 % — SIGNIFICANT CHANGE UP (ref 13–44)
MANUAL SMEAR VERIFICATION: SIGNIFICANT CHANGE UP
MCHC RBC-ENTMCNC: 30.1 PG — SIGNIFICANT CHANGE UP (ref 27–34)
MCHC RBC-ENTMCNC: 31.4 GM/DL — LOW (ref 32–36)
MCV RBC AUTO: 96 FL — SIGNIFICANT CHANGE UP (ref 80–100)
MONOCYTES # BLD AUTO: 0.39 K/UL — SIGNIFICANT CHANGE UP (ref 0–0.9)
MONOCYTES NFR BLD AUTO: 12 % — SIGNIFICANT CHANGE UP (ref 2–14)
NEUTROPHILS # BLD AUTO: 2.09 K/UL — SIGNIFICANT CHANGE UP (ref 1.8–7.4)
NEUTROPHILS NFR BLD AUTO: 60 % — SIGNIFICANT CHANGE UP (ref 43–77)
NEUTS BAND # BLD: 4 % — SIGNIFICANT CHANGE UP (ref 0–8)
NITRITE UR-MCNC: NEGATIVE — SIGNIFICANT CHANGE UP
NRBC # BLD: 0 /100 — SIGNIFICANT CHANGE UP (ref 0–0)
NRBC # BLD: SIGNIFICANT CHANGE UP /100 WBCS (ref 0–0)
PH UR: 5 — SIGNIFICANT CHANGE UP (ref 5–8)
PLAT MORPH BLD: NORMAL — SIGNIFICANT CHANGE UP
PLATELET # BLD AUTO: 116 K/UL — LOW (ref 150–400)
POTASSIUM SERPL-MCNC: 4.2 MMOL/L — SIGNIFICANT CHANGE UP (ref 3.5–5.3)
POTASSIUM SERPL-SCNC: 4.2 MMOL/L — SIGNIFICANT CHANGE UP (ref 3.5–5.3)
PROT SERPL-MCNC: 7.5 GM/DL — SIGNIFICANT CHANGE UP (ref 6–8.3)
PROT UR-MCNC: 30 MG/DL
RBC # BLD: 3.75 M/UL — LOW (ref 3.8–5.2)
RBC # FLD: 15 % — HIGH (ref 10.3–14.5)
RBC BLD AUTO: NORMAL — SIGNIFICANT CHANGE UP
RBC CASTS # UR COMP ASSIST: SIGNIFICANT CHANGE UP /HPF (ref 0–4)
SODIUM SERPL-SCNC: 140 MMOL/L — SIGNIFICANT CHANGE UP (ref 135–145)
SP GR SPEC: 1.01 — SIGNIFICANT CHANGE UP (ref 1.01–1.02)
TROPONIN I SERPL-MCNC: 0.05 NG/ML — HIGH (ref 0.01–0.04)
TROPONIN I SERPL-MCNC: 0.05 NG/ML — HIGH (ref 0.01–0.04)
UROBILINOGEN FLD QL: 1 MG/DL
VARIANT LYMPHS # BLD: 2 % — SIGNIFICANT CHANGE UP (ref 0–6)
WBC # BLD: 3.26 K/UL — LOW (ref 3.8–10.5)
WBC # FLD AUTO: 3.26 K/UL — LOW (ref 3.8–10.5)
WBC UR QL: SIGNIFICANT CHANGE UP

## 2018-11-17 PROCEDURE — 71045 X-RAY EXAM CHEST 1 VIEW: CPT | Mod: 26

## 2018-11-17 PROCEDURE — 93010 ELECTROCARDIOGRAM REPORT: CPT

## 2018-11-17 PROCEDURE — 99291 CRITICAL CARE FIRST HOUR: CPT

## 2018-11-17 PROCEDURE — 74177 CT ABD & PELVIS W/CONTRAST: CPT | Mod: 26

## 2018-11-17 RX ORDER — MORPHINE SULFATE 50 MG/1
4 CAPSULE, EXTENDED RELEASE ORAL ONCE
Qty: 0 | Refills: 0 | Status: DISCONTINUED | OUTPATIENT
Start: 2018-11-17 | End: 2018-11-17

## 2018-11-17 RX ORDER — HEPARIN SODIUM 5000 [USP'U]/ML
5000 INJECTION INTRAVENOUS; SUBCUTANEOUS EVERY 12 HOURS
Qty: 0 | Refills: 0 | Status: DISCONTINUED | OUTPATIENT
Start: 2018-11-17 | End: 2018-11-19

## 2018-11-17 RX ORDER — SODIUM CHLORIDE 9 MG/ML
3 INJECTION INTRAMUSCULAR; INTRAVENOUS; SUBCUTANEOUS ONCE
Qty: 0 | Refills: 0 | Status: COMPLETED | OUTPATIENT
Start: 2018-11-17 | End: 2018-11-17

## 2018-11-17 RX ORDER — METOPROLOL TARTRATE 50 MG
25 TABLET ORAL DAILY
Qty: 0 | Refills: 0 | Status: DISCONTINUED | OUTPATIENT
Start: 2018-11-17 | End: 2018-11-19

## 2018-11-17 RX ORDER — ACYCLOVIR SODIUM 500 MG
400 VIAL (EA) INTRAVENOUS
Qty: 0 | Refills: 0 | Status: DISCONTINUED | OUTPATIENT
Start: 2018-11-17 | End: 2018-11-19

## 2018-11-17 RX ORDER — FUROSEMIDE 40 MG
0.5 TABLET ORAL
Qty: 0 | Refills: 0 | COMMUNITY

## 2018-11-17 RX ORDER — SODIUM CHLORIDE 9 MG/ML
1000 INJECTION INTRAMUSCULAR; INTRAVENOUS; SUBCUTANEOUS ONCE
Qty: 0 | Refills: 0 | Status: COMPLETED | OUTPATIENT
Start: 2018-11-17 | End: 2018-11-17

## 2018-11-17 RX ORDER — INFLUENZA VIRUS VACCINE 15; 15; 15; 15 UG/.5ML; UG/.5ML; UG/.5ML; UG/.5ML
0.5 SUSPENSION INTRAMUSCULAR ONCE
Qty: 0 | Refills: 0 | Status: COMPLETED | OUTPATIENT
Start: 2018-11-17 | End: 2018-11-17

## 2018-11-17 RX ORDER — ONDANSETRON 8 MG/1
4 TABLET, FILM COATED ORAL ONCE
Qty: 0 | Refills: 0 | Status: COMPLETED | OUTPATIENT
Start: 2018-11-17 | End: 2018-11-17

## 2018-11-17 RX ORDER — ATORVASTATIN CALCIUM 80 MG/1
1 TABLET, FILM COATED ORAL
Qty: 0 | Refills: 0 | COMMUNITY

## 2018-11-17 RX ORDER — SODIUM CHLORIDE 9 MG/ML
1000 INJECTION INTRAMUSCULAR; INTRAVENOUS; SUBCUTANEOUS
Qty: 0 | Refills: 0 | Status: DISCONTINUED | OUTPATIENT
Start: 2018-11-17 | End: 2018-11-19

## 2018-11-17 RX ORDER — MORPHINE SULFATE 50 MG/1
4 CAPSULE, EXTENDED RELEASE ORAL EVERY 6 HOURS
Qty: 0 | Refills: 0 | Status: DISCONTINUED | OUTPATIENT
Start: 2018-11-17 | End: 2018-11-19

## 2018-11-17 RX ORDER — ASPIRIN/CALCIUM CARB/MAGNESIUM 324 MG
325 TABLET ORAL ONCE
Qty: 0 | Refills: 0 | Status: COMPLETED | OUTPATIENT
Start: 2018-11-17 | End: 2018-11-17

## 2018-11-17 RX ORDER — FAMOTIDINE 10 MG/ML
20 INJECTION INTRAVENOUS ONCE
Qty: 0 | Refills: 0 | Status: COMPLETED | OUTPATIENT
Start: 2018-11-17 | End: 2018-11-17

## 2018-11-17 RX ORDER — OMEPRAZOLE 10 MG/1
1 CAPSULE, DELAYED RELEASE ORAL
Qty: 0 | Refills: 0 | COMMUNITY

## 2018-11-17 RX ADMIN — ONDANSETRON 4 MILLIGRAM(S): 8 TABLET, FILM COATED ORAL at 08:55

## 2018-11-17 RX ADMIN — MORPHINE SULFATE 4 MILLIGRAM(S): 50 CAPSULE, EXTENDED RELEASE ORAL at 08:55

## 2018-11-17 RX ADMIN — SODIUM CHLORIDE 1000 MILLILITER(S): 9 INJECTION INTRAMUSCULAR; INTRAVENOUS; SUBCUTANEOUS at 10:35

## 2018-11-17 RX ADMIN — SODIUM CHLORIDE 1000 MILLILITER(S): 9 INJECTION INTRAMUSCULAR; INTRAVENOUS; SUBCUTANEOUS at 08:55

## 2018-11-17 RX ADMIN — FAMOTIDINE 20 MILLIGRAM(S): 10 INJECTION INTRAVENOUS at 08:55

## 2018-11-17 RX ADMIN — SODIUM CHLORIDE 3 MILLILITER(S): 9 INJECTION INTRAMUSCULAR; INTRAVENOUS; SUBCUTANEOUS at 08:56

## 2018-11-17 RX ADMIN — MORPHINE SULFATE 4 MILLIGRAM(S): 50 CAPSULE, EXTENDED RELEASE ORAL at 15:00

## 2018-11-17 RX ADMIN — HEPARIN SODIUM 5000 UNIT(S): 5000 INJECTION INTRAVENOUS; SUBCUTANEOUS at 18:25

## 2018-11-17 RX ADMIN — SODIUM CHLORIDE 75 MILLILITER(S): 9 INJECTION INTRAMUSCULAR; INTRAVENOUS; SUBCUTANEOUS at 18:24

## 2018-11-17 RX ADMIN — ONDANSETRON 4 MILLIGRAM(S): 8 TABLET, FILM COATED ORAL at 14:34

## 2018-11-17 RX ADMIN — MORPHINE SULFATE 4 MILLIGRAM(S): 50 CAPSULE, EXTENDED RELEASE ORAL at 09:35

## 2018-11-17 RX ADMIN — MORPHINE SULFATE 4 MILLIGRAM(S): 50 CAPSULE, EXTENDED RELEASE ORAL at 14:35

## 2018-11-17 NOTE — ED ADULT NURSE NOTE - NSIMPLEMENTINTERV_GEN_ALL_ED
Implemented All Universal Safety Interventions:  [place] to call system. Call bell, personal items and telephone within reach. Instruct patient to call for assistance. Room bathroom lighting operational. Non-slip footwear when patient is off stretcher. Physically safe environment: no spills, clutter or unnecessary equipment. Stretcher in lowest position, wheels locked, appropriate side rails in place.

## 2018-11-17 NOTE — ED PROVIDER NOTE - NS_ ATTENDINGSCRIBEDETAILS _ED_A_ED_FT
The scribe's documentation has been prepared under my direction and personally reviewed by me in its entirety.  I confirm that the note above accurately reflects all my work, treatment, procedures, and decision making except where otherwise noted or amended by me.  Jamshid Dan M.D.

## 2018-11-17 NOTE — H&P ADULT - NSHPLABSRESULTS_GEN_ALL_CORE
11.3   3.26  )-----------( 116      ( 17 Nov 2018 08:45 )             36.0       11-17    140  |  104  |  28<H>  ----------------------------<  110<H>  4.2   |  28  |  1.27    Ca    8.9      17 Nov 2018 08:45    TPro  7.5  /  Alb  3.9  /  TBili  0.6  /  DBili  x   /  AST  18  /  ALT  21  /  AlkPhos  108  11-17      CT abdomen: High-grade distal small bowel obstruction with transition in the right   lower quadrant, similar to prior. Small ascites and mesenteric edema   without pneumatosis or free air.  EKG sinus Raoul, no acute ST changes.   Troponin 0.047, 0.052

## 2018-11-17 NOTE — H&P ADULT - HISTORY OF PRESENT ILLNESS
The patient is an 83 YOF PMH HTN, HLD,DM2,  lymphoma for one yearin remission last chemo months ago , recent Zoster,  multiple hospitalizations for recurrent SBO, last admission was at [facility] last month, she was discharged home on 10/10/18. The patient was not a candidate for surgery initially because of low platelets and  low wbc from chemotherapy. Yesterday she developed abdominal pain, nausea, vomiting, she also had CP. She did not eat since yesterday. The patient has been refusing NG tube and was contemplating palliative care. The patient son who is her HCP is consenting to  surgery if it improves her symptoms.    Family Hx: mother  of diabetes complications, father  of MI. The patient is an 83 YOF PMH HTN, HLD,DM2,  lymphoma for one year ,  s/p chemo , in remission last chemo 2 months ago , recent Zoster,  multiple hospitalizations for recurrent SBO, last admission was at [facility] last month, she was discharged home on 10/10/18. The patient was not a candidate for surgery initially because of low platelets and  low wbc from chemotherapy. Yesterday she developed abdominal pain, nausea, vomiting, she also had CP. She did not eat since yesterday. The patient has been refusing NG tube and was contemplating palliative care. The patient son who is her HCP is consenting to  surgery if it improves her symptoms.    Family Hx: mother  of diabetes complications, father  of MI.

## 2018-11-17 NOTE — PATIENT PROFILE ADULT - NSPROEXTENSIONSOFSELF_GEN_A_NUR
2018  EMPLOYEE INFORMATION: EMPLOYER INFORMATION:   NAME: Malik Long [employer]    : 1959 [phone]   DATE OF INJURY/EVENT: 2018          Location: [facility]   Treating Provider: David Obrien MD,MPH  Time In:  1:34 PM Time Out:  1:51 PM      DIAGNOSIS:   1. Glenoid labrum tear, right, subsequent encounter    2. Strain of right shoulder, subsequent encounter    3. Contusion of right elbow, subsequent encounter      STATUS: This injury is determined to be WORK RELATED.   RETURN TO WORK:  Employee may return to work with restrictions.     Return Date: 2018           RESTRICTIONS:   May lift/push/pull up to 2 lbs with right arm.    No overhead work with right arm.    No commercial .    Restrictions are to be followed at work and at home  Restrictions are in effect until next follow-up visit  Diagnostic Testing:   MR arthrogram right shoulder  ANTICIPATED MAXIMUM MEDICAL IMPROVEMENT:  ?  TREATMENT PLAN:  Consult orthopedics  Heat  Strengthening and stretches  Medications as needed  FOLLOW UP:  1 week after consult    Thank you for the privilege of providing medical care for this injury/condition.  If there are any questions, please call the clinic at Dept: [phone].    Electronically signed on 2018 at 1:51 PM by:   David Obrien MD,MPH   Medical Director  [facility]     none

## 2018-11-17 NOTE — ED ADULT NURSE REASSESSMENT NOTE - NS ED NURSE REASSESS COMMENT FT1
Rajani RN on 3 E made aware that pt's son refuse PO meds until he speaks with Dr. Luke. Awaiting call back.

## 2018-11-17 NOTE — H&P ADULT - PROBLEM SELECTOR PLAN 1
admit to medicine ,  Consult Dr. Daugherty colorectal surgeon,  GI consult Dr. Moulton admit to medicine ,  Consult Dr. Daugherty colorectal surgeon,  GI consult Dr. Moulton, refused NGT,  keep NPO , started on IV NS,

## 2018-11-17 NOTE — H&P ADULT - ASSESSMENT
84 YO F PMH HTN, HLD,DM2,  lymphoma in remission last chemo months ago , multiple hospitalizations for recurrent SBO, last admission was at [facility] last month, she was discharged home on 10/10/18. The patient was not a candidate for surgery initially because of low platelets and  low wbc from chemotherapy. Yesterday she developed abdominal pain, nausea, vomiting, she also had CP. She did not eat since yesterday. The patient has been refusing NG tube and was contemplating palliative care. The patient son who is her HCP is consenting for surgery to improve her symptoms.

## 2018-11-17 NOTE — H&P ADULT - NSHPPHYSICALEXAM_GEN_ALL_CORE
Physical Exam: Vital Signs Last 24 Hrs  T(C): 36.6 (17 Nov 2018 17:21), Max: 37.1 (17 Nov 2018 14:29)  T(F): 97.8 (17 Nov 2018 17:21), Max: 98.7 (17 Nov 2018 14:29)  HR: 60 (17 Nov 2018 17:21) (60 - 69)  BP: 147/55 (17 Nov 2018 17:21) (120/52 - 147/55)  BP(mean): --  RR: 18 (17 Nov 2018 17:21) (17 - 18)  SpO2: 99% (17 Nov 2018 17:21) (98% - 99%)        HEENT: PRRL EOMI    MOUTH/TEETH/GUMS: Clear    NECK: no JVD    LUNGS: Clear    HEART: S1,S2 RR    ABDOMEN: tender periumbilical area, hypoactive BS,     EXTREMITIES: leg edema:     MUSCULOSKELETAL:     NEURO: no tremor, no focal signs.    SKIN: no rash    : CVA negative,

## 2018-11-17 NOTE — ED PROVIDER NOTE - OBJECTIVE STATEMENT
[age] YOF PMH HTN, HLD, lymphoma, chronic SBO, p/w epigastric pain. Associated CP.  Pain aching, constant, severe, associated NB NB emesis.  No fevers, chills, sweats, weight loss, fatigue, or malaise. Pain worsens with eating. No other modifying factors.  Sx sudden onset last night.  Similar episodes in the past revealed chronic SBO. 83 YOF PMH HTN, HLD, lymphoma, chronic SBO, p/w epigastric pain. Associated CP.  Pain aching, constant, severe, associated NB NB emesis.  Pain worsens with eating. No other modifying factors.  Sx sudden onset last night.  Similar episodes in the past revealed chronic SBO.   No fevers, chills, sweats, weight loss, fatigue, or malaise.  No vomiting.

## 2018-11-17 NOTE — H&P ADULT - NSHPOUTPATIENTPROVIDERS_GEN_ALL_CORE
PCP: Dr. Catrachita Mcmahon Tel [phone],  Oncologist Dr. Loja, PCP: Dr. Catrachita Mcmahon Tel [phone],  Oncologist Dr. Loja, at [facility]

## 2018-11-17 NOTE — ED PROVIDER NOTE - PROGRESS NOTE DETAILS
JW High grade SBO, surgery consultation.  NSTEMI likely demand. ASA. Scribe AS for Dr. Dan: Pt declines NG tube. Benton AS for Dr. Dan: No emergent surgical intervention needed per Dr. Gregorio. Pt declines NG tube again. Will admit to medicine for further surgical evaluation and consultation. Benton AS for Dr. Dan: No emergent surgical intervention needed per Dr. Gregorio. Pt declines NG tube and aspirin. Will admit to medicine for further surgical evaluation and consultation.  Justification for admission SBO, NSTEMI.  Given presentation and findings, patient requires hospitalization.  I discussed all findings from our evaluation today with the patient and the medical necessity for admission to the hospital.  I addressed expectations regarding the admission and I discussed the plan of care in detail.  I addressed all questions and concerns regarding the admission and the plan of care.  I used verbal teach back to confirm understanding.  The patient agreed with the admission and the plan of care.  Pt signed out to Dr. Dodge. Patient's history, vital signs, lab results, imaging, pertinent findings, and clinical condition reviewed during sign out.  At sign out patient admitted in hemodynamically stable condition in no acute distress.

## 2018-11-17 NOTE — ED ADULT TRIAGE NOTE - CHIEF COMPLAINT QUOTE
Pt ambulatory to triage with son c/o nausea, vomiting and diffuse upper abdominal pain since last night. Vomited once last night and once again this morning.

## 2018-11-17 NOTE — H&P ADULT - NSHPREVIEWOFSYSTEMS_GEN_ALL_CORE
Review of Systems: as in HPI,     Eyes: no changes in vision    ENT/Mouth: no changes    Cardiovascular: had CP earlier , now has no CP    Respiratory: no SOB    Gastrointestinal: abdominal pain, nausea, vomiting, , no BM    Genitourinary: no dysuria    Breast: no pain    Musculoskeletal: no pain    Integumentary: no itching    Neurological: No Headache, no tremor,    Psychiatric: no suicidal ideations    Endocrine: no excessive thirst,     Hematologic/Lymphatic: no swollen glands    Allergic/Immunologic: no itching.

## 2018-11-17 NOTE — ED PROVIDER NOTE - MEDICAL DECISION MAKING DETAILS
83 YOF PMH HTN, HLD, lymphoma, chronic SBO, p/w epigastric pain. Associated CP. VSS WNL.  Exam reveals epigastric tenderness. DDX ACS, SBO, perforation.  R/O ACS, CT AP evaluate for obstruction, metabolic and hematologic evaluation, symptomatic treatment, admit.

## 2018-11-18 LAB
ANION GAP SERPL CALC-SCNC: 8 MMOL/L — SIGNIFICANT CHANGE UP (ref 5–17)
BASOPHILS # BLD AUTO: 0 K/UL — SIGNIFICANT CHANGE UP (ref 0–0.2)
BASOPHILS NFR BLD AUTO: 0 % — SIGNIFICANT CHANGE UP (ref 0–2)
BUN SERPL-MCNC: 22 MG/DL — SIGNIFICANT CHANGE UP (ref 7–23)
CALCIUM SERPL-MCNC: 8.5 MG/DL — SIGNIFICANT CHANGE UP (ref 8.5–10.1)
CHLORIDE SERPL-SCNC: 109 MMOL/L — HIGH (ref 96–108)
CO2 SERPL-SCNC: 25 MMOL/L — SIGNIFICANT CHANGE UP (ref 22–31)
CREAT SERPL-MCNC: 1.03 MG/DL — SIGNIFICANT CHANGE UP (ref 0.5–1.3)
DACRYOCYTES BLD QL SMEAR: SLIGHT — SIGNIFICANT CHANGE UP
EOSINOPHIL # BLD AUTO: 0 K/UL — SIGNIFICANT CHANGE UP (ref 0–0.5)
EOSINOPHIL NFR BLD AUTO: 0 % — SIGNIFICANT CHANGE UP (ref 0–6)
GLUCOSE SERPL-MCNC: 72 MG/DL — SIGNIFICANT CHANGE UP (ref 70–99)
HCT VFR BLD CALC: 27.9 % — LOW (ref 34.5–45)
HGB BLD-MCNC: 8.7 G/DL — LOW (ref 11.5–15.5)
LYMPHOCYTES # BLD AUTO: 0.7 K/UL — LOW (ref 1–3.3)
LYMPHOCYTES # BLD AUTO: 65 % — HIGH (ref 13–44)
MANUAL SMEAR VERIFICATION: SIGNIFICANT CHANGE UP
MCHC RBC-ENTMCNC: 30.3 PG — SIGNIFICANT CHANGE UP (ref 27–34)
MCHC RBC-ENTMCNC: 31.2 GM/DL — LOW (ref 32–36)
MCV RBC AUTO: 97.2 FL — SIGNIFICANT CHANGE UP (ref 80–100)
MONOCYTES # BLD AUTO: 0.06 K/UL — SIGNIFICANT CHANGE UP (ref 0–0.9)
MONOCYTES NFR BLD AUTO: 6 % — SIGNIFICANT CHANGE UP (ref 2–14)
NEUTROPHILS # BLD AUTO: 0.31 K/UL — LOW (ref 1.8–7.4)
NEUTROPHILS NFR BLD AUTO: 29 % — LOW (ref 43–77)
NRBC # BLD: 0 /100 — SIGNIFICANT CHANGE UP (ref 0–0)
NRBC # BLD: SIGNIFICANT CHANGE UP /100 WBCS (ref 0–0)
PLAT MORPH BLD: NORMAL — SIGNIFICANT CHANGE UP
PLATELET # BLD AUTO: 73 K/UL — LOW (ref 150–400)
PLATELET COUNT - ESTIMATE: ABNORMAL
POIKILOCYTOSIS BLD QL AUTO: SLIGHT — SIGNIFICANT CHANGE UP
POTASSIUM SERPL-MCNC: 4.2 MMOL/L — SIGNIFICANT CHANGE UP (ref 3.5–5.3)
POTASSIUM SERPL-SCNC: 4.2 MMOL/L — SIGNIFICANT CHANGE UP (ref 3.5–5.3)
RBC # BLD: 2.87 M/UL — LOW (ref 3.8–5.2)
RBC # FLD: 15.1 % — HIGH (ref 10.3–14.5)
RBC BLD AUTO: ABNORMAL
SODIUM SERPL-SCNC: 142 MMOL/L — SIGNIFICANT CHANGE UP (ref 135–145)
WBC # BLD: 1.08 K/UL — CRITICAL LOW (ref 3.8–10.5)
WBC # FLD AUTO: 1.08 K/UL — CRITICAL LOW (ref 3.8–10.5)

## 2018-11-18 PROCEDURE — 99222 1ST HOSP IP/OBS MODERATE 55: CPT

## 2018-11-18 RX ADMIN — Medication 10 MILLIGRAM(S): at 05:53

## 2018-11-18 RX ADMIN — Medication 400 MILLIGRAM(S): at 05:52

## 2018-11-18 RX ADMIN — HEPARIN SODIUM 5000 UNIT(S): 5000 INJECTION INTRAVENOUS; SUBCUTANEOUS at 05:52

## 2018-11-18 RX ADMIN — Medication 400 MILLIGRAM(S): at 17:37

## 2018-11-18 RX ADMIN — Medication 25 MILLIGRAM(S): at 11:35

## 2018-11-18 NOTE — CONSULT NOTE ADULT - ASSESSMENT
The patient is an 83 YOF PMH HTN, HLD,DM2,  lymphoma , malnourished, anemic, no more symptoms of SBO.  Mildly positive troponins, with no CP and no changes on ECG.  Conservative RX only at present.

## 2018-11-18 NOTE — CONSULT NOTE ADULT - SUBJECTIVE AND OBJECTIVE BOX
Patient is a 83y old  Female who presents with a chief complaint of SBO (18 Nov 2018 08:56)      HPI:  The patient is an 83 YOF PMH HTN, HLD,DM2,  lymphoma for one year,  s/p chem , in remission last chemo 2 months ago with multiple hospitalizations for recurrent partial SBO.  her last admission was at [facility] last month, she was discharged home on 10/10/18. The patient was not a candidate for surgery initially because of low platelets and  low wbc from chemotherapy. ON 11/17 she developed abdominal pain, nausea, vomiting, she also had CP. She did not eat since yesterday. The patient has been refusing NG tube and was contemplating palliative care. The patient son who is her HCP is consenting to surgery if it improves her symptoms  Pt feeling a little better this am with passage of gas and is now hungry    PAST MEDICAL & SURGICAL HISTORY:  CKD (chronic kidney disease) stage 3, GFR 30-59 ml/min  Anemia, unspecified type  GERD (gastroesophageal reflux disease)  High cholesterol  Diabetes  HTN (hypertension)  H/O: hysterectomy  S/P cholecystectomy  S/P appendectomy    MEDICATIONS  (STANDING):  acyclovir   Oral Tab/Cap 400 milliGRAM(s) Oral two times a day  enalapril 10 milliGRAM(s) Oral daily  heparin  Injectable 5000 Unit(s) SubCutaneous every 12 hours  influenza   Vaccine 0.5 milliLiter(s) IntraMuscular once  metoprolol tartrate 25 milliGRAM(s) Oral daily  sodium chloride 0.9%. 1000 milliLiter(s) (75 mL/Hr) IV Continuous <Continuous>    MEDICATIONS  (PRN):  morphine  - Injectable 4 milliGRAM(s) SubCutaneous every 6 hours PRN Severe Pain (7 - 10)    Allergies  penicillin (Rash)    SOCIAL HISTORY: nc    FAMILY HISTORY:  No pertinent family history in first degree relatives      REVIEW OF SYSTEMS:    CONSTITUTIONAL: weakness  EYES/ENT: No visual changes;  No vertigo or throat pain   NECK: No pain or stiffness  RESPIRATORY: No cough, wheezing, hemoptysis; No shortness of breath  CARDIOVASCULAR: No chest pain or palpitations  GASTROINTESTINAL: as above  GENITOURINARY: No dysuria, frequency or hematuria  NEUROLOGICAL: No numbness  PSYCH: Normal mood and affect  All other review of systems is negative unless indicated above.    Vital Signs Last 24 Hrs  T(C): 36.6 (18 Nov 2018 10:01), Max: 37.1 (17 Nov 2018 14:29)  T(F): 97.9 (18 Nov 2018 10:01), Max: 98.7 (17 Nov 2018 14:29)  HR: 64 (18 Nov 2018 10:01) (57 - 64)  BP: 128/43 (18 Nov 2018 10:01) (115/43 - 147/55)  BP(mean): --  RR: 18 (18 Nov 2018 10:01) (18 - 18)  SpO2: 100% (18 Nov 2018 10:01) (97% - 100%)    PHYSICAL EXAM:    Constitutional: NAD, appears chronically ill  HEENT: MMM  Neck: supple  Respiratory: CTA  Cardiovascular: S1 and S2  Gastrointestinal: BS+, soft, mildly distended  Extremities: No peripheral edema  Vascular: 2+ peripheral pulses  Neurological: A/O x 3, no focal deficits  Psychiatric: Normal mood, normal affect  Skin: No rashes    LABS:                        8.7    1.08  )-----------( 73       ( 18 Nov 2018 06:21 )             27.9     11-18    142  |  109<H>  |  22  ----------------------------<  72  4.2   |  25  |  1.03    Ca    8.5      18 Nov 2018 06:21    TPro  7.5  /  Alb  3.9  /  TBili  0.6  /  DBili  x   /  AST  18  /  ALT  21  /  AlkPhos  108  11-17      LIVER FUNCTIONS - ( 17 Nov 2018 08:45 )  Alb: 3.9 g/dL / Pro: 7.5 gm/dL / ALK PHOS: 108 U/L / ALT: 21 U/L / AST: 18 U/L / GGT: x             RADIOLOGY & ADDITIONAL STUDIES:

## 2018-11-18 NOTE — PROGRESS NOTE ADULT - SUBJECTIVE AND OBJECTIVE BOX
The patient is an 83 YOF PMH HTN, HLD,DM2,  lymphoma for one year ,  s/p chemo , in remission last chemo 2 months ago , recent Zoster,  multiple hospitalizations for recurrent SBO, last admission was at [facility] last month, she was discharged home on 10/10/18. The patient was not a candidate for surgery initially because of low platelets and  low wbc from chemotherapy. Yesterday she developed abdominal pain, nausea, vomiting, she also had CP. She did not eat since yesterday. The patient has been refusing NG tube and was contemplating palliative care. The patient son who is her HCP is consenting to  surgery if it improves her symptoms.    18; Patient seen and examined. She denies any N/V or abd pain. Passing gases. Discussed with so at bed side regarding management and d/c plan.       Vital Signs Last 24 Hrs  T(C): 36.6 (2018 10:01), Max: 37.1 (2018 14:29)  T(F): 97.9 (2018 10:01), Max: 98.7 (2018 14:29)  HR: 64 (2018 10:01) (57 - 64)  BP: 128/43 (2018 10:01) (115/43 - 147/55)  BP(mean): --  RR: 18 (2018 10:01) (18 - 18)  SpO2: 100% (2018 10:01) (97% - 100%)        Physical Exam:       HEENT: PRRL EOMI    	MOUTH/TEETH/GUMS: Clear    	NECK: no JVD    	LUNGS: Clear    	HEART: S1,S2 RR    	ABDOMEN: tender periumbilical area, hypoactive BS,     	EXTREMITIES: leg edema:     	MUSCULOSKELETAL:     	NEURO: no tremor, no focal signs.    	SKIN: no rash    	: CVA negative,            Labs:                           8.7    1.08  )-----------( 73       ( 2018 06:21 )             27.9     2018 06:21    142    |  109    |  22     ----------------------------<  72     4.2     |  25     |  1.03     Ca    8.5        2018 06:21    TPro  7.5    /  Alb  3.9    /  TBili  0.6    /  DBili  x      /  AST  18     /  ALT  21     /  AlkPhos  108    2018 08:45    LIVER FUNCTIONS - ( 2018 08:45 )  Alb: 3.9 g/dL / Pro: 7.5 gm/dL / ALK PHOS: 108 U/L / ALT: 21 U/L / AST: 18 U/L / GGT: x             CAPILLARY BLOOD GLUCOSE        CARDIAC MARKERS ( 2018 12:14 )  0.052 ng/mL / x     / x     / x     / x      CARDIAC MARKERS ( 2018 08:45 )  0.047 ng/mL / x     / x     / x     / x          Urinalysis Basic - ( 2018 12:52 )    Color: Yellow / Appearance: Clear / S.015 / pH: x  Gluc: x / Ketone: Negative  / Bili: Negative / Urobili: 1 mg/dL   Blood: x / Protein: 30 mg/dL / Nitrite: Negative   Leuk Esterase: Negative / RBC: 0-2 /HPF / WBC 0-2   Sq Epi: x / Non Sq Epi: Occasional / Bacteria: Moderate          MEDICATIONS:    acyclovir   Oral Tab/Cap 400 milliGRAM(s) Oral two times a day  enalapril 10 milliGRAM(s) Oral daily  heparin  Injectable 5000 Unit(s) SubCutaneous every 12 hours  influenza   Vaccine 0.5 milliLiter(s) IntraMuscular once  metoprolol tartrate 25 milliGRAM(s) Oral daily  sodium chloride 0.9%. 1000 milliLiter(s) (75 mL/Hr) IV Continuous <Continuous>    MEDICATIONS  (PRN):  morphine  - Injectable 4 milliGRAM(s) SubCutaneous every 6 hours PRN Severe Pain (7 - 10)          Assessment and Plan:   Assessment:  · Assessment		  84 YO F PMH HTN, HLD,DM2,  lymphoma in remission last chemo months ago , multiple hospitalizations for recurrent SBO, last admission was at [facility] last month, she was discharged home on 10/10/18. The patient was not a candidate for surgery initially because of low platelets and  low wbc from chemotherapy. Yesterday she developed abdominal pain, nausea, vomiting, she also had CP. She did not eat since yesterday. The patient has been refusing NG tube and was contemplating palliative care. The patient son who is her HCP is consenting for surgery to improve her symptoms.    Problem/Plan - 1:  ·  Problem: SBO (small bowel obstruction).  ,    Consult  colorectal consult appreciated   GI consult Dr. Moulton pending  Started clears-tolerating   refused NGT initially      Problem/Plan - 2:  ·  Problem: Elevated troponin   demand ischemia     Problem/Plan - 3:  ·  Problem: Diabetes.  Plan: start BGM with humalog coverage.     Problem/Plan - 4:  ·  Problem: Lymphoma.  Plan: in remission, completed chemo,  had negative PET scan.     Pancytopenia-due to chemo  Heme eval  Follow

## 2018-11-18 NOTE — CONSULT NOTE ADULT - SUBJECTIVE AND OBJECTIVE BOX
Chief Complaint: abdominal pain    HPI:     83 Y old female with  NH lymphome s/p 4 RCHOP , last chemo 3mo,  A fib on Eliquis, MV valvular disease, GERD, p/w recurrent SBO. Now passing flatus and bms. Denies abd pain, n/v since yesterday.     PAST MEDICAL & SURGICAL HISTORY:  CKD (chronic kidney disease) stage 3, GFR 30-59 ml/min  Anemia, unspecified type  GERD (gastroesophageal reflux disease)  High cholesterol  Diabetes  HTN (hypertension)  H/O: hysterectomy  S/P cholecystectomy  S/P appendectomy    Social history - denies smoking, ETOH use, denies IVDU    Vital Signs Last 24 Hrs  T(C): 36.6 (18 Nov 2018 04:42), Max: 37.1 (17 Nov 2018 14:29)  T(F): 97.9 (18 Nov 2018 04:42), Max: 98.7 (17 Nov 2018 14:29)  HR: 57 (18 Nov 2018 04:42) (57 - 61)  BP: 115/43 (18 Nov 2018 04:42) (115/43 - 147/55)  RR: 18 (17 Nov 2018 17:21) (18 - 18)  SpO2: 97% (18 Nov 2018 04:42) (97% - 99%)      PHYSICAL EXAM:    Constitutional: NAD, awake and alert, well-developed  HEENT: PERR, EOMI, Normal Hearing, MMM  Neck: Soft and supple, No LAD, No JVD + NGT   Respiratory: Breath sounds are clear bilaterally, No wheezing, rales or rhonchi  Cardiovascular: S1 and S2, irregular rate and rhythm, +  Murmurs, gallops or rubs  Gastrointestinal: soft, nontender, nondistended, no guarding, no rebound  Extremities: No peripheral edema  Vascular: 2+ peripheral pulses  Neurological: A/O x 3, no focal deficits  Musculoskeletal: 5/5 strength b/l upper and lower extremities  Skin: No rashes    Home Medications:  acyclovir 400 mg oral tablet: 1 tab(s) orally 2 times a day (17 Nov 2018 16:59)  Bactrim  mg-160 mg oral tablet: 1 tab(s) orally 2 times a day (17 Nov 2018 16:59)  enalapril 10 mg oral tablet: 1 tab(s) orally once a day (17 Nov 2018 16:59)  hyoscyamine 0.125 mg oral tablet: 1 tab(s) orally 3 times a day, As Needed (17 Nov 2018 16:59)  metoprolol tartrate 25 mg oral tablet: 0.5 tab(s) orally once a day (17 Nov 2018 16:59)  Protonix 40 mg oral delayed release tablet: 1 tab(s) orally once a day (17 Nov 2018 16:59)                          8.7    1.08  )-----------( 73       ( 18 Nov 2018 06:21 )             27.9   11-18    142  |  109<H>  |  22  ----------------------------<  72  4.2   |  25  |  1.03    Ca    8.5      18 Nov 2018 06:21    TPro  7.5  /  Alb  3.9  /  TBili  0.6  /  DBili  x   /  AST  18  /  ALT  21  /  AlkPhos  108  11-17    < from: CT Abdomen and Pelvis w/ IV Cont (11.17.18 @ 12:05) >  PROCEDURE DATE:  11/17/2018          INTERPRETATION:  CT ABDOMEN AND PELVIS IC    HISTORY:  Epigastric abdominal pain with nausea and vomiting    Technique: CT of the abdomen and pelvis is performed without oral with   intravenous contrast. Axial images are supplemented with coronal and   sagittal reformations. This study was performed using automatic exposure   control (radiation dose reduction software) to obtain a diagnostic image   quality scan with patient dose as low as reasonably achievable.    Contrast: 90 cc Omnipaque 350    Comparison: CT abdomen and pelvis 7/21/2018    Findings:  LIVER: Normal.  SPLEEN: Normal.  PANCREAS: Diffuse atrophy.  GALLBLADDER/BILIARY TREE: StableMild postcholecystectomy dilatation.  ADRENALS: Normal.  KIDNEYS: No calculi, hydronephrosis, or soft tissue attenuating renal   mass.  LYMPHADENOPATHY/RETROPERITONEUM: No adenopathy.  VASCULATURE: Aortoiliac atherosclerosis without aneurysm.  BOWEL:High-grade distal small bowel obstruction with transition in the   right lower quadrant, similar to prior. Small ascites and mesenteric   edema without pneumatosis or free air.  PELVIC VISCERA: Status post hysterectomy.  PELVIC LYMPH NODES: No pelvicadenopathy.  PERITONEUM/ABDOMINAL WALL: As above.  SKELETAL: Mild superior endplate compression fracture of L5 since the   prior.  LUNG BASES: Clear.    IMPRESSION:     High-grade distal small bowel obstruction with transition in the right   lower quadrant, similar to prior. Small ascites and mesenteric edema   without pneumatosis or free air.    < end of copied text >

## 2018-11-18 NOTE — CONSULT NOTE ADULT - ASSESSMENT
82yo female with hx lymphoma with recurrent bowel obstruction  pt slowly improving with conservative mgmt  will start clears and advance to fulls tomorrow if tolerated  pt considering elective surgery with Dr Beasley for potential PAULA

## 2018-11-18 NOTE — DIETITIAN INITIAL EVALUATION ADULT. - PROBLEM SELECTOR PLAN 1
admit to medicine ,  Consult Dr. Daugherty colorectal surgeon,  GI consult Dr. Moulton, refused NGT,  keep NPO , started on IV NS,

## 2018-11-18 NOTE — DIETITIAN INITIAL EVALUATION ADULT. - PERTINENT LABORATORY DATA
11-18 Na142 mmol/L Glu 72 mg/dL K+ 4.2 mmol/L Cr  1.03 mg/dL BUN 22 mg/dL Phos n/a   Alb n/a   PAB n/a

## 2018-11-18 NOTE — CONSULT NOTE ADULT - SUBJECTIVE AND OBJECTIVE BOX
HPI:  The patient is an 83 YOF PMH HTN, HLD,DM2,  lymphoma for one year ,  s/p chemo , in remission last chemo 2 months ago , recent Zoster,  multiple hospitalizations for recurrent SBO, last admission was at [place] last month, she was discharged home on 10/10/18. The patient was not a candidate for surgery initially because of low platelets and  low wbc from chemotherapy. Yesterday she developed abdominal pain, nausea, vomiting, she also had CP. She did not eat since yesterday. The patient has been refusing NG tube and was contemplating palliative care. The patient son who is her HCP is consenting to  surgery if it improves her symptoms.  She was found to have a minimally elevated Troponin, no chest pain at present, no SOB.    Family Hx: mother  of diabetes complications, father  of MI. (2018 17:16)      PAST MEDICAL & SURGICAL HISTORY:  CKD (chronic kidney disease) stage 3, GFR 30-59 ml/min  Anemia, unspecified type  GERD (gastroesophageal reflux disease)  High cholesterol  Diabetes  HTN (hypertension)  H/O: hysterectomy  S/P cholecystectomy  S/P appendectomy      HPI:                PREVIOUS DIAGNOSTIC TESTING:      ECHO  FINDINGS:    STRESS  FINDINGS:    CATHETERIZATION  FINDINGS:    MEDICATIONS  (STANDING):  acyclovir   Oral Tab/Cap 400 milliGRAM(s) Oral two times a day  enalapril 10 milliGRAM(s) Oral daily  heparin  Injectable 5000 Unit(s) SubCutaneous every 12 hours  influenza   Vaccine 0.5 milliLiter(s) IntraMuscular once  metoprolol tartrate 25 milliGRAM(s) Oral daily  sodium chloride 0.9%. 1000 milliLiter(s) (75 mL/Hr) IV Continuous <Continuous>    MEDICATIONS  (PRN):  morphine  - Injectable 4 milliGRAM(s) SubCutaneous every 6 hours PRN Severe Pain (7 - 10)      FAMILY HISTORY:  No pertinent family history in first degree relatives      SOCIAL HISTORY:    CIGARETTES:    ALCOHOL:    REVIEW OF SYSTEMS:  CONSTITUTIONAL:  No night sweats.  No fatigue, malaise, lethargy.  No fever or chills.  HEENT:  Eyes:  No visual changes.  No eye pain.  No eye discharge.    ENT:  No runny nose.  No epistaxis.  No sinus pain.  No sore throat.  No odynophagia.  No ear pain.  No congestion.  RESPIRATORY:  No cough.  No wheeze.  No hemoptysis.  No shortness of breath.  CARDIOVASCULAR:  No chest pains.  No palpitations. No shortness of breath, orthopnea or PND.  GASTROINTESTINAL:  No abdominal pain.  No nausea or vomiting.  No diarrhea or constipation.  No hematemesis.  No hematochezia.  No melena.  GENITOURINARY:  No urgency.  No frequency.  No dysuria.  No hematuria.  No obstructive symptoms.  No discharge.  No pain.  No significant abnormal bleeding.  MUSCULOSKELETAL:  No musculoskeletal pain.  No joint swelling.  No arthritis.  NEUROLOGICAL:  No tingling or numbness or weakness.  PSYCHIATRIC:  No confusion  SKIN:  No rashes.  No lesions.  No wounds.  ENDOCRINE:  No unexplained weight loss.  No polydipsia.  No polyuria.  No polyphagia.  HEMATOLOGIC:  No anemia.  No purpura.  No petechiae.  No prolonged or excessive bleeding.   ALLERGIC AND IMMUNOLOGIC:  No pruritus.  No swelling.         Vital Signs Last 24 Hrs  T(C): 36.6 (2018 10:01), Max: 37.1 (2018 14:29)  T(F): 97.9 (2018 10:01), Max: 98.7 (2018 14:29)  HR: 64 (2018 10:) (57 - 64)  BP: 128/43 (2018 10:) (115/43 - 147/55)  BP(mean): --  RR: 18 (2018 10:) (18 - 18)  SpO2: 100% (2018 10:) (97% - 100%)    PHYSICAL EXAM-    Constitutional: The patient appears to be malnourished but alert and oriented to time, place and person. The patient does  appear  ill.     Head: Head is normocephalic and atraumatic.      Neck: The patient's neck is supple without enlargement, has no palpable thyromegaly nor thyroid nodules and has no jugular venous distention. No audible carotid bruits. There are strong carotid pulses bilaterally. No JVD.     Cardiovascular: Regular rate and rhythm without S3, S4. No murmurs or rubs are appreciated.      Respiratory:  The patient has no rales and no rhonchi. The patient has no wheezes.     Abdomen: Soft, nontender, nondistended with positive bowel sounds.      Extremity: No tenderness. There is no pitting edema, skin discoloration, clubbing and cyanosis.           INTERPRETATION OF TELEMETRY:    ECG:    I&O's Detail      LABS:                        8.7    1.08  )-----------( 73       ( 2018 06:21 )             27.9     11-18    142  |  109<H>  |  22  ----------------------------<  72  4.2   |  25  |  1.03    Ca    8.5      2018 06:21    TPro  7.5  /  Alb  3.9  /  TBili  0.6  /  DBili  x   /  AST  18  /  ALT  21  /  AlkPhos  108  11-17    CARDIAC MARKERS ( 2018 12:14 )  0.052 ng/mL / x     / x     / x     / x      CARDIAC MARKERS ( 2018 08:45 )  0.047 ng/mL / x     / x     / x     / x            Urinalysis Basic - ( 2018 12:52 )    Color: Yellow / Appearance: Clear / S.015 / pH: x  Gluc: x / Ketone: Negative  / Bili: Negative / Urobili: 1 mg/dL   Blood: x / Protein: 30 mg/dL / Nitrite: Negative   Leuk Esterase: Negative / RBC: 0-2 /HPF / WBC 0-2   Sq Epi: x / Non Sq Epi: Occasional / Bacteria: Moderate      I&O's Summary    BNP  RADIOLOGY & ADDITIONAL STUDIES:

## 2018-11-18 NOTE — CHART NOTE - NSCHARTNOTEFT_GEN_A_CORE
Upon Nutritional Assessment by the Registered Dietitian your patient was determined to meet criteria / has evidence of the following diagnosis/diagnoses:          [ ]  Mild Protein Calorie Malnutrition        [ ]  Moderate Protein Calorie Malnutrition        [x] Severe Protein Calorie Malnutrition        [ ] Unspecified Protein Calorie Malnutrition        [x] Underweight / BMI <19        [ ] Morbid Obesity / BMI > 40      Findings:  Upon visit, pt appears thin and underwt (BMI 18.7) with NFPE significant for moderate temporal and clavicle muscle wasting.  moderate orbital fat wasting.  Pt following liquid diet at home, which meets <75% of estimated nutr needs x 7 months.  Pt with 46# (29%) wt loss x 7 months, clinically significant. no edema.  no BM, now passing gas.  haley score of 18, no PU.  Based on above, pt meets criteria for severe malnutrition in chronic illness.    Findings as based on:  •  Comprehensive nutrition assessment and consultation  •  Calorie counts (nutrient intake analysis)  •  Food acceptance and intake status from observations by staff  •  Follow up  •  Patient education  •  Intervention secondary to interdisciplinary rounds  •   concerns      Treatment:    The following diet has been recommended:  1) as tolerated, feasible advance diet to full liquids with ensure enlive TID and ensure pudding BID   2) monitor PO intake/tolerance   3) obtain new wt daily   4) monitor lytes/mins; replete/correct prn    PROVIDER Section:     By signing this assessment you are acknowledging and agree with the diagnosis/diagnoses assigned by the Registered Dietitian    Comments: Upon Nutritional Assessment by the Registered Dietitian your patient was determined to meet criteria / has evidence of the following diagnosis/diagnoses:          [ ]  Mild Protein Calorie Malnutrition        [ ]  Moderate Protein Calorie Malnutrition        [x] Severe Protein Calorie Malnutrition        [ ] Unspecified Protein Calorie Malnutrition        [x] Underweight / BMI <19        [ ] Morbid Obesity / BMI > 40      Findings:  Upon visit, pt appears thin and underwt (BMI 18.7) with NFPE significant for moderate temporal and clavicle muscle wasting.  moderate orbital fat wasting.  Pt following liquid diet at home, which meets <75% of estimated nutr needs x 7 months.  Pt with 46# (29%) wt loss x 7 months, clinically significant. no edema.  no BM, now passing gas.  haley score of 18, no PU.  Based on above, pt meets criteria for severe malnutrition in chronic illness.    Findings as based on:  •  Comprehensive nutrition assessment and consultation  •  Calorie counts (nutrient intake analysis)  •  Food acceptance and intake status from observations by staff  •  Follow up  •  Patient education  •  Intervention secondary to interdisciplinary rounds  •   concerns      Treatment:    The following diet has been recommended:  1) as tolerated, feasible advance diet to full liquids with ensure enlive TID and ensure pudding BID   2) monitor PO intake/tolerance   3) obtain new wt daily   4) monitor lytes/mins; replete/correct prn  5) nutrient panel: B12, vitamin D, folate, and iron    PROVIDER Section:     By signing this assessment you are acknowledging and agree with the diagnosis/diagnoses assigned by the Registered Dietitian    Comments:

## 2018-11-18 NOTE — DIETITIAN INITIAL EVALUATION ADULT. - PHYSICAL APPEARANCE
underweight/other (specify)/moderate temporal, scapula muscle wasting.  moderate orbital fat wasting.

## 2018-11-18 NOTE — CONSULT NOTE ADULT - ASSESSMENT
83F w/ hx of recurrent sbo and malnutrition.   -passing flatus and BMs  -rec advance diet as tolerated.  -Discussed surgical options with son, f/u as outpatient for elective lysis of adhesions

## 2018-11-18 NOTE — DIETITIAN INITIAL EVALUATION ADULT. - OTHER INFO
received consult for decreased PO intake; 82yo female wtih PMH of HTN, HLD, DM2, lymphoma x 1 yr s/p chemo in remission x 2 months with multiple hospitlizations for recurrent SBO (5) pending eventual surgery.  Upon visit, pt appears thin and underwt (BMI 18.7) with NFPE significant for moderate temporal and clavicle muscle wasting.  moderate orbital fat wasting.  Pt following liquid diet at home, which meets <75% of estimated nutr needs x 7 months.  Pt with 46# (29%) wt loss x 7 months, clinically significant. no edema.  no BM, now passing gas.  haley score of 18, no PU.  Based on above, pt meets criteria for severe malnutrition in chronic illness.  d/w pt importance of calorie and protein intake.  Provided examples on how to increase intake to meet estimated nutr needs to boost nutr status, including drinking 3 ensure plus per day, which would provide pt ~1050Kcal and 39g protein; meeting ~73% of estimated calorie needs and ~50% of estimated protein needs. The ensure should be in addition to other PO intake, as d/w pt.  RECOMMENDATIONS: 1) as tolerated, feasible advance diet to full liquids with ensure enlive TID and ensure pudding BID 2) monitor PO intake/tolerance 3) obtain new wt daily 4) monitor lytes/mins; replete/correct prn

## 2018-11-18 NOTE — DIETITIAN INITIAL EVALUATION ADULT. - ORAL INTAKE PTA
poor/mostly liquid diet x 4 months; honey O's with milk/ensure plus, 1 cup puree chicken soup (Lunch/dinner) with 1 additional ensure plus daily

## 2018-11-18 NOTE — DIETITIAN INITIAL EVALUATION ADULT. - PERTINENT MEDS FT
MEDICATIONS  (STANDING):  acyclovir   Oral Tab/Cap 400 milliGRAM(s) Oral two times a day  enalapril 10 milliGRAM(s) Oral daily  heparin  Injectable 5000 Unit(s) SubCutaneous every 12 hours  influenza   Vaccine 0.5 milliLiter(s) IntraMuscular once  metoprolol tartrate 25 milliGRAM(s) Oral daily  sodium chloride 0.9%. 1000 milliLiter(s) (75 mL/Hr) IV Continuous <Continuous>    MEDICATIONS  (PRN):  morphine  - Injectable 4 milliGRAM(s) SubCutaneous every 6 hours PRN Severe Pain (7 - 10)

## 2018-11-19 ENCOUNTER — TRANSCRIPTION ENCOUNTER (OUTPATIENT)
Age: 83
End: 2018-11-19

## 2018-11-19 VITALS
HEART RATE: 58 BPM | TEMPERATURE: 98 F | DIASTOLIC BLOOD PRESSURE: 50 MMHG | OXYGEN SATURATION: 100 % | RESPIRATION RATE: 18 BRPM | SYSTOLIC BLOOD PRESSURE: 120 MMHG

## 2018-11-19 DIAGNOSIS — D70.2 OTHER DRUG-INDUCED AGRANULOCYTOSIS: ICD-10-CM

## 2018-11-19 LAB
ANION GAP SERPL CALC-SCNC: 6 MMOL/L — SIGNIFICANT CHANGE UP (ref 5–17)
ANISOCYTOSIS BLD QL: SLIGHT — SIGNIFICANT CHANGE UP
BASOPHILS # BLD AUTO: 0.02 K/UL — SIGNIFICANT CHANGE UP (ref 0–0.2)
BASOPHILS NFR BLD AUTO: 2 % — SIGNIFICANT CHANGE UP (ref 0–2)
BUN SERPL-MCNC: 16 MG/DL — SIGNIFICANT CHANGE UP (ref 7–23)
CALCIUM SERPL-MCNC: 8.3 MG/DL — LOW (ref 8.5–10.1)
CHLORIDE SERPL-SCNC: 112 MMOL/L — HIGH (ref 96–108)
CO2 SERPL-SCNC: 27 MMOL/L — SIGNIFICANT CHANGE UP (ref 22–31)
CREAT SERPL-MCNC: 0.9 MG/DL — SIGNIFICANT CHANGE UP (ref 0.5–1.3)
ELLIPTOCYTES BLD QL SMEAR: SLIGHT — SIGNIFICANT CHANGE UP
EOSINOPHIL # BLD AUTO: 0.02 K/UL — SIGNIFICANT CHANGE UP (ref 0–0.5)
EOSINOPHIL NFR BLD AUTO: 2 % — SIGNIFICANT CHANGE UP (ref 0–6)
GLUCOSE SERPL-MCNC: 84 MG/DL — SIGNIFICANT CHANGE UP (ref 70–99)
HCT VFR BLD CALC: 26.4 % — LOW (ref 34.5–45)
HGB BLD-MCNC: 8.3 G/DL — LOW (ref 11.5–15.5)
LG PLATELETS BLD QL AUTO: SLIGHT — SIGNIFICANT CHANGE UP
LYMPHOCYTES # BLD AUTO: 0.62 K/UL — LOW (ref 1–3.3)
LYMPHOCYTES # BLD AUTO: 76 % — HIGH (ref 13–44)
MACROCYTES BLD QL: SLIGHT — SIGNIFICANT CHANGE UP
MANUAL SMEAR VERIFICATION: SIGNIFICANT CHANGE UP
MCHC RBC-ENTMCNC: 30.6 PG — SIGNIFICANT CHANGE UP (ref 27–34)
MCHC RBC-ENTMCNC: 31.4 GM/DL — LOW (ref 32–36)
MCV RBC AUTO: 97.4 FL — SIGNIFICANT CHANGE UP (ref 80–100)
METAMYELOCYTES # FLD: 1 % — HIGH (ref 0–0)
MICROCYTES BLD QL: SLIGHT — SIGNIFICANT CHANGE UP
MONOCYTES # BLD AUTO: 0.03 K/UL — SIGNIFICANT CHANGE UP (ref 0–0.9)
MONOCYTES NFR BLD AUTO: 4 % — SIGNIFICANT CHANGE UP (ref 2–14)
NEUTROPHILS # BLD AUTO: 0.12 K/UL — LOW (ref 1.8–7.4)
NEUTROPHILS NFR BLD AUTO: 15 % — LOW (ref 43–77)
NRBC # BLD: 0 /100 — SIGNIFICANT CHANGE UP (ref 0–0)
NRBC # BLD: SIGNIFICANT CHANGE UP /100 WBCS (ref 0–0)
PLAT MORPH BLD: NORMAL — SIGNIFICANT CHANGE UP
PLATELET # BLD AUTO: 75 K/UL — LOW (ref 150–400)
PLATELET COUNT - ESTIMATE: ABNORMAL
POIKILOCYTOSIS BLD QL AUTO: SLIGHT — SIGNIFICANT CHANGE UP
POTASSIUM SERPL-MCNC: 3.8 MMOL/L — SIGNIFICANT CHANGE UP (ref 3.5–5.3)
POTASSIUM SERPL-SCNC: 3.8 MMOL/L — SIGNIFICANT CHANGE UP (ref 3.5–5.3)
RBC # BLD: 2.71 M/UL — LOW (ref 3.8–5.2)
RBC # FLD: 15 % — HIGH (ref 10.3–14.5)
RBC BLD AUTO: ABNORMAL
SODIUM SERPL-SCNC: 145 MMOL/L — SIGNIFICANT CHANGE UP (ref 135–145)
WBC # BLD: 0.82 K/UL — CRITICAL LOW (ref 3.8–10.5)
WBC # FLD AUTO: 0.82 K/UL — CRITICAL LOW (ref 3.8–10.5)

## 2018-11-19 RX ORDER — FILGRASTIM 480MCG/1.6
300 VIAL (ML) INJECTION DAILY
Qty: 0 | Refills: 0 | Status: DISCONTINUED | OUTPATIENT
Start: 2018-11-19 | End: 2018-11-19

## 2018-11-19 RX ORDER — ACYCLOVIR SODIUM 500 MG
1 VIAL (EA) INTRAVENOUS
Qty: 0 | Refills: 0 | COMMUNITY

## 2018-11-19 RX ORDER — AZTREONAM 2 G
1 VIAL (EA) INJECTION
Qty: 0 | Refills: 0 | COMMUNITY

## 2018-11-19 RX ADMIN — Medication 300 MICROGRAM(S): at 10:56

## 2018-11-19 RX ADMIN — Medication 10 MILLIGRAM(S): at 06:01

## 2018-11-19 RX ADMIN — Medication 25 MILLIGRAM(S): at 06:01

## 2018-11-19 RX ADMIN — Medication 400 MILLIGRAM(S): at 06:01

## 2018-11-19 NOTE — DISCHARGE NOTE ADULT - MEDICATION SUMMARY - MEDICATIONS TO TAKE
I will START or STAY ON the medications listed below when I get home from the hospital:    enalapril 10 mg oral tablet  -- 1 tab(s) by mouth once a day  -- Indication: For HTN    metoprolol tartrate 25 mg oral tablet  -- 0.5 tab(s) by mouth once a day  -- Indication: For HTN    hyoscyamine 0.125 mg oral tablet  -- 1 tab(s) by mouth 3 times a day, As Needed  -- Indication: For GI    Protonix 40 mg oral delayed release tablet  -- 1 tab(s) by mouth once a day  -- Indication: For GI    Cipro 250 mg oral tablet  -- 1 tab(s) by mouth 2 times a day   -- Avoid prolonged or excessive exposure to direct and/or artificial sunlight while taking this medication.  Check with your doctor before becoming pregnant.  Do not take dairy products, antacids, or iron preparations within one hour of this medication.  Finish all this medication unless otherwise directed by prescriber.  Medication should be taken with plenty of water.    -- Indication: For infection prophylaxis

## 2018-11-19 NOTE — PROGRESS NOTE ADULT - ASSESSMENT
A/P:  83F h/o HTN, HLD,DM2,  lymphoma , malnourished, anemic, no more symptoms of SBO.    1. +Troponins. Minimally elevated in setting of dehydration. No CP. No findings on tele.   No acute EKG changes. Continue conservative medical mgmt.     2. Dehydration. Cont IVF.     3. SBO- surgery following. Now on full liquids.     4. DVT proph. Replete lytes as needed.

## 2018-11-19 NOTE — CONSULT NOTE ADULT - PROBLEM SELECTOR RECOMMENDATION 3
likely due to adhesions   recurrent but now resolved clinically   shall timothy need surgical intervention eventually

## 2018-11-19 NOTE — DISCHARGE NOTE ADULT - MEDICATION SUMMARY - MEDICATIONS TO STOP TAKING
I will STOP taking the medications listed below when I get home from the hospital:    acyclovir 400 mg oral tablet  -- 1 tab(s) by mouth 2 times a day    Bactrim  mg-160 mg oral tablet  -- 1 tab(s) by mouth 2 times a day

## 2018-11-19 NOTE — PROGRESS NOTE ADULT - SUBJECTIVE AND OBJECTIVE BOX
Patient doing well. Tolerating clears. Denies abd pain. +flatus    Vital Signs Last 24 Hrs  T(C): 36.8 (19 Nov 2018 05:57), Max: 36.9 (18 Nov 2018 16:46)  T(F): 98.2 (19 Nov 2018 05:57), Max: 98.4 (18 Nov 2018 16:46)  HR: 59 (19 Nov 2018 05:57) (57 - 64)  BP: 119/55 (19 Nov 2018 05:57) (119/55 - 128/43)  BP(mean): --  RR: 18 (18 Nov 2018 10:01) (18 - 18)  SpO2: 96% (19 Nov 2018 05:57) (96% - 100%)    abd soft, non distended non tender                          8.3    0.82  )-----------( 75       ( 19 Nov 2018 06:02 )             26.4   11-19    145  |  112<H>  |  16  ----------------------------<  84  3.8   |  27  |  0.90    Ca    8.3<L>      19 Nov 2018 06:02

## 2018-11-19 NOTE — CONSULT NOTE ADULT - PROBLEM SELECTOR RECOMMENDATION 9
likely due to acyclovir and bactrim  rec   DC both  neupogen today  may DC home if ok with surgery with prophylactic cipro 500 BID   FU with DR Rodriguez tomorrow to continue neupogen ( Has appt at 8 am)  DW Dr SHILO Guerra

## 2018-11-19 NOTE — CONSULT NOTE ADULT - SUBJECTIVE AND OBJECTIVE BOX
Patient is a 83y old  Female who presents with a chief complaint of SBO (19 Nov 2018 09:20)      HPI:  The patient is an 83 YOF PMH HTN, HLD,DM2,  lymphoma for one year ,  s/p chemo , in remission last chemo 2 months ago , multiple hospitalizations for recurrent SBO, last admission was at [facility] last month, she was discharged home on 10/10/18.   her SBO has now improved  the patient remains afebrile but is neutropenic    I spoke to Dr Conner ( Dr. Polanco's partner) who confirmed that the patient's last chemo was at least 2 months back  she was started of Bactrim and acyclovir for prevention about a week back by Dr Polanco. no recent zoster            PAST MEDICAL & SURGICAL HISTORY:  CKD (chronic kidney disease) stage 3, GFR 30-59 ml/min  Anemia, unspecified type  GERD (gastroesophageal reflux disease)  High cholesterol  Diabetes  HTN (hypertension)  H/O: hysterectomy  S/P cholecystectomy  S/P appendectomy  Multiple episodes of SBO likely due to adhesions.      REVIEW OF SYSTEMS    General:	The patient feels well.  no unexpected weight loss. Appetite good. no night sweats.  Skin: no Rash.	  ENT: no sore throat or oral pain. no difficulty with swallowing  Respiratory: No chest pain, No shortness of breath, no hemoptysis, no cough, no chest pain.  Cardiovascular : No chest pain. no palpitation.  Gastrointestinal:  No anorexia. no pain, no blood in stool.   Genitourinary: No hematuria , no dysuria	  Musculoskeletal: No myalgia, no arthralgia, no back pain, no joint swelling  Neurological: no headaches, no dizzyness, no weakness, no double vision. 	  Psychiatric: no change in mood. 		  Endocrine:	no burning in urine or frequency  Allergic/Immunologic:	 no fever.     Allergies    penicillin (Rash)    Intolerances    SH  son is a surgical PA who I spoke with      FAMILY HISTORY:  No pertinent family history in first degree relatives      Home Medications:  acyclovir 400 mg oral tablet: 1 tab(s) orally 2 times a day (17 Nov 2018 16:59)  Bactrim  mg-160 mg oral tablet: 1 tab(s) orally 2 times a day (17 Nov 2018 16:59)  enalapril 10 mg oral tablet: 1 tab(s) orally once a day (17 Nov 2018 16:59)  hyoscyamine 0.125 mg oral tablet: 1 tab(s) orally 3 times a day, As Needed (17 Nov 2018 16:59)  metoprolol tartrate 25 mg oral tablet: 0.5 tab(s) orally once a day (17 Nov 2018 16:59)  Protonix 40 mg oral delayed release tablet: 1 tab(s) orally once a day (17 Nov 2018 16:59)      MEDICATIONS  (STANDING):  enalapril 10 milliGRAM(s) Oral daily  filgrastim-sndz Injectable 300 MICROGram(s) SubCutaneous daily  heparin  Injectable 5000 Unit(s) SubCutaneous every 12 hours  influenza   Vaccine 0.5 milliLiter(s) IntraMuscular once  metoprolol tartrate 25 milliGRAM(s) Oral daily  sodium chloride 0.9%. 1000 milliLiter(s) (75 mL/Hr) IV Continuous <Continuous>    MEDICATIONS  (PRN):  morphine  - Injectable 4 milliGRAM(s) SubCutaneous every 6 hours PRN Severe Pain (7 - 10)      PHYSICAL EXAM:  Vital Signs Last 24 Hrs  T(C): 36.8 (19 Nov 2018 05:57), Max: 36.9 (18 Nov 2018 16:46)  T(F): 98.2 (19 Nov 2018 05:57), Max: 98.4 (18 Nov 2018 16:46)  HR: 59 (19 Nov 2018 05:57) (57 - 64)  BP: 119/55 (19 Nov 2018 05:57) (119/55 - 128/43)  BP(mean): --  RR: 18 (18 Nov 2018 10:01) (18 - 18)  SpO2: 96% (19 Nov 2018 05:57) (96% - 100%)      Gen: well developed, well nourished, comfortable  British speaking  HEENT: normocephalic/atraumatic, no conjunctival pallor, no scleral icterus, no oral thrush/mucosal bleeding/mucositis  Neck: supple, no masses, no JVD  Breasts: deferred  Back: nontender  Cardiovascular: heart shounds Normal S1S2, no murmurs/rubs/gallops  Respiratory: air entry normal and equal on both sides. no wheeze, no ronchi,   Gastrointestinal: BS+, soft, NT/ND, no masses, no splenomegaly, no hepatomegaly,   no evidence for ascites  Genitourinary: deferred  Rectal: deferred  Extremities: no clubbing/cyanosis, no edema, no calf tenderness  Neurological:  Alert oriented X3 cranial nerves normal. no focal deficits  Skin: no rash on visible skin  Lymph Nodes:  no cervical/supraclavicular LAD, no axillary/groin LAD  Musculoskeletal:  full ROM  Psychiatric:  mood appears normal. not obviously anxious or depressed.        LABS:                        8.3    0.82  )-----------( 75       ( 19 Nov 2018 06:02 )             26.4     19 Nov 2018 06:02    145    |  112    |  16     ----------------------------<  84     3.8     |  27     |  0.90     Ca    8.3        19 Nov 2018 06:02              RADIOLOGY & ADDITIONAL STUDIES:    recent PET CT negative for any evidence of recurrence of lymphoma

## 2018-11-19 NOTE — DISCHARGE NOTE ADULT - CARE PLAN
Principal Discharge DX:	SBO (small bowel obstruction)  Goal:	prevent further admission  Assessment and plan of treatment:	Follow up with surgery

## 2018-11-19 NOTE — DISCHARGE NOTE ADULT - HOSPITAL COURSE
The patient is an 83 YOF PMH HTN, HLD,DM2,  lymphoma for one year ,  s/p chemo , in remission last chemo 2 months ago , recent Zoster,  multiple hospitalizations for recurrent SBO, last admission was at [facility] last month, she was discharged home on 10/10/18. The patient was not a candidate for surgery initially because of low platelets and  low wbc from chemotherapy. Yesterday she developed abdominal pain, nausea, vomiting, she also had CP. She did not eat since yesterday. The patient has been refusing NG tube and was contemplating palliative care. The patient son who is her HCP is consenting to  surgery if it improves her symptoms.    11/18/18; Patient seen and examined. She denies any N/V or abd pain. Passing gases. Discussed with so at bed side regarding management and d/c plan.   11/19/18: Patient seen and examined. Tolerating full liquid diet. Discussed with Dr MARAL Guerra. As per him, she can be discharged today after neupogen and follow up with her hematoligt in am and get the shot there.       Vital Signs Last 24 Hrs  T(C): 36.9 (19 Nov 2018 11:41), Max: 36.9 (18 Nov 2018 16:46)  T(F): 98.4 (19 Nov 2018 11:41), Max: 98.4 (18 Nov 2018 16:46)  HR: 58 (19 Nov 2018 11:41) (57 - 59)  BP: 120/50 (19 Nov 2018 11:41) (119/55 - 120/52)  BP(mean): --  RR: 18 (19 Nov 2018 11:41) (18 - 18)  SpO2: 100% (19 Nov 2018 11:41) (96% - 100%)    Physical Exam:       HEENT: PRRL EOMI    	MOUTH/TEETH/GUMS: Clear    	NECK: no JVD    	LUNGS: Clear    	HEART: S1,S2 RR    	ABDOMEN: tender periumbilical area, hypoactive BS,     	EXTREMITIES: leg edema:   CNS: grossly intact          Assessment and Plan:   Assessment:  · Assessment		  84 YO F PMH HTN, HLD,DM2,  lymphoma in remission last chemo months ago , multiple hospitalizations for recurrent SBO, last admission was at [facility] last month, she was discharged home on 10/10/18. The patient was not a candidate for surgery initially because of low platelets and  low wbc from chemotherapy. Yesterday she developed abdominal pain, nausea, vomiting, she also had CP. She did not eat since yesterday. The patient has been refusing NG tube and was contemplating palliative care. The patient son who is her HCP is consenting for surgery to improve her symptoms.    Problem/Plan - 1:  ·  Problem: SBO (small bowel obstruction).  ,    colorectal consult appreciated  Tolerating full liquid diet.  D/C on full liquid diet as per colorectal and follow up outpatient for surgery   refused NGT initially      Problem/Plan - 2:  ·  Problem: Elevated troponin   demand ischemia     Problem/Plan - 3:  ·  Problem: Diabetes.  Plan: on BGM with humalog coverage.     Problem/Plan - 4:  ·  Problem: Lymphoma.  Plan: in remission, completed chemo,  had negative PET scan.     Pancytopenia-possibly drug induced  Leukopenia  Dr MARAL Guerra consult appreciated  Neupogen today  Outpatient neupogen with her hematology      D/C home today  Spent more than 30 minutes to prepare the discharge.

## 2018-11-19 NOTE — PROGRESS NOTE ADULT - SUBJECTIVE AND OBJECTIVE BOX
Cardiology Consultation    HPI: The patient is an 83 YOF PMH HTN, HLD,DM2,  lymphoma for one year ,  s/p chemo , in remission last chemo 2 months ago , recent Zoster,  multiple hospitalizations for recurrent SBO, last admission was at [facility] last month, she was discharged home on 10/10/18. The patient was not a candidate for surgery initially because of low platelets and  low wbc from chemotherapy. Yesterday she developed abdominal pain, nausea, vomiting, she also had CP. She did not eat since yesterday. The patient has been refusing NG tube and was contemplating palliative care. The patient son who is her HCP is consenting to  surgery if it improves her symptoms.    - No CP/SOB. SR on tele. No events last pm.     PAST MEDICAL & SURGICAL HISTORY:  CKD (chronic kidney disease) stage 3, GFR 30-59 ml/min  Anemia, unspecified type  GERD (gastroesophageal reflux disease)  High cholesterol  Diabetes  HTN (hypertension)  H/O: hysterectomy  S/P cholecystectomy  S/P appendectomy    Allergies  penicillin (Rash)    SOCIAL HISTORY: Denies tobacco, etoh abuse or illicit drug use    FAMILY HISTORY: No pertinent family history in first degree relatives    MEDICATIONS  (STANDING):  enalapril 10 milliGRAM(s) Oral daily  heparin  Injectable 5000 Unit(s) SubCutaneous every 12 hours  influenza   Vaccine 0.5 milliLiter(s) IntraMuscular once  metoprolol tartrate 25 milliGRAM(s) Oral daily  sodium chloride 0.9%. 1000 milliLiter(s) (75 mL/Hr) IV Continuous <Continuous>    MEDICATIONS  (PRN):  morphine  - Injectable 4 milliGRAM(s) SubCutaneous every 6 hours PRN Severe Pain (7 - 10)    Vital Signs Last 24 Hrs  T(C): 36.8 (2018 05:57), Max: 36.9 (2018 16:46)  T(F): 98.2 (2018 05:57), Max: 98.4 (2018 16:46)  HR: 59 (2018 05:57) (57 - 64)  BP: 119/55 (2018 05:57) (119/55 - 128/43)  BP(mean): --  RR: 18 (2018 10:01) (18 - 18)  SpO2: 96% (2018 05:57) (96% - 100%)    REVIEW OF SYSTEMS:    CONSTITUTIONAL:  As per HPI.  HEENT:  Eyes:  No diplopia or blurred vision. ENT:  No earache, sore throat or runny nose.  CARDIOVASCULAR:  No pressure, squeezing, strangling, tightness, heaviness or aching about the chest, neck, axilla or epigastrium.  RESPIRATORY:  No cough, shortness of breath, PND or orthopnea.  GASTROINTESTINAL:  No nausea, vomiting or diarrhea.  GENITOURINARY:  No dysuria, frequency or urgency.  MUSCULOSKELETAL:  As per HPI.  NEUROLOGIC:  No paresthesias, fasciculations, seizures or weakness.    PHYSICAL EXAMINATION:    GENERAL APPEARANCE:  Pt. is not currently dyspneic, in no distress. Pt. is alert, oriented, and pleasant.  HEENT:  Pupils are normal and react normally. No icterus. Mucous membranes well colored.  NECK:  Supple. No lymphadenopathy. Jugular venous pressure not elevated. Carotids equal.   HEART:   The cardiac impulse has a normal quality. There are no murmurs, rubs or gallops noted  CHEST:  Chest is clear to auscultation. Normal respiratory effort.  ABDOMEN:  Soft and nontender.   EXTREMITIES:  There is no edema.     I&O's Summary    2018 07:01  -  2018 07:00  --------------------------------------------------------  IN: 0 mL / OUT: 1 mL / NET: -1 mL    LABS:                        8.3    0.82  )-----------( 75       ( 2018 06:02 )             26.4     -    145  |  112<H>  |  16  ----------------------------<  84  3.8   |  27  |  0.90    Ca    8.3<L>      2018 06:02    CARDIAC MARKERS ( 2018 12:14 )  0.052 ng/mL / x     / x     / x     / x        Urinalysis Basic - ( 2018 12:52 )    Color: Yellow / Appearance: Clear / S.015 / pH: x  Gluc: x / Ketone: Negative  / Bili: Negative / Urobili: 1 mg/dL   Blood: x / Protein: 30 mg/dL / Nitrite: Negative   Leuk Esterase: Negative / RBC: 0-2 /HPF / WBC 0-2   Sq Epi: x / Non Sq Epi: Occasional / Bacteria: Moderate    EKG: < from: 12 Lead ECG (18 @ 09:13) >  Sinus bradycardia  Otherwise normal ECG  When compared with ECG of 2018 10:01,  No significant change was found    TELEMETRY: SR    CARDIAC TESTS: pending    RADIOLOGY & ADDITIONAL STUDIES: NAPD    ASSESSMENT & PLAN:

## 2018-11-19 NOTE — DISCHARGE NOTE ADULT - PATIENT PORTAL LINK FT
You can access the [facility] Patient Portal, offered by [facility], by registering with the following website: [email or web address]

## 2018-11-19 NOTE — PROGRESS NOTE ADULT - ASSESSMENT
83F w/ neutropenia s/p RCHOP presented with SBO likely 2/2 adhesive disease.    -Having bowel function, advance to full liquids  -f/u as outpatient for elective PAULA
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
## 2018-11-27 DIAGNOSIS — E43 UNSPECIFIED SEVERE PROTEIN-CALORIE MALNUTRITION: ICD-10-CM

## 2018-11-27 DIAGNOSIS — I12.9 HYPERTENSIVE CHRONIC KIDNEY DISEASE WITH STAGE 1 THROUGH STAGE 4 CHRONIC KIDNEY DISEASE, OR UNSPECIFIED CHRONIC KIDNEY DISEASE: ICD-10-CM

## 2018-11-27 DIAGNOSIS — E86.0 DEHYDRATION: ICD-10-CM
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
## 2018-11-27 DIAGNOSIS — E78.5 HYPERLIPIDEMIA, UNSPECIFIED: ICD-10-CM

## 2018-11-27 DIAGNOSIS — E11.22 TYPE 2 DIABETES MELLITUS WITH DIABETIC CHRONIC KIDNEY DISEASE: ICD-10-CM

## 2018-11-27 DIAGNOSIS — K56.50 INTESTINAL ADHESIONS [BANDS], UNSPECIFIED AS TO PARTIAL VERSUS COMPLETE OBSTRUCTION: ICD-10-CM

## 2018-11-27 DIAGNOSIS — I24.8 OTHER FORMS OF ACUTE ISCHEMIC HEART DISEASE: ICD-10-CM

## 2018-11-27 DIAGNOSIS — R11.10 VOMITING, UNSPECIFIED: ICD-10-CM

## 2018-11-27 DIAGNOSIS — T36.8X5A ADVERSE EFFECT OF OTHER SYSTEMIC ANTIBIOTICS, INITIAL ENCOUNTER: ICD-10-CM

## 2018-11-27 DIAGNOSIS — D64.9 ANEMIA, UNSPECIFIED: ICD-10-CM

## 2018-11-27 DIAGNOSIS — Z53.29 PROCEDURE AND TREATMENT NOT CARRIED OUT BECAUSE OF PATIENT'S DECISION FOR OTHER REASONS: ICD-10-CM

## 2018-11-27 DIAGNOSIS — N18.9 CHRONIC KIDNEY DISEASE, UNSPECIFIED: ICD-10-CM

## 2018-11-27 DIAGNOSIS — Z90.710 ACQUIRED ABSENCE OF BOTH CERVIX AND UTERUS: ICD-10-CM

## 2018-11-27 DIAGNOSIS — Z90.49 ACQUIRED ABSENCE OF OTHER SPECIFIED PARTS OF DIGESTIVE TRACT: ICD-10-CM

## 2018-11-27 DIAGNOSIS — Z88.0 ALLERGY STATUS TO PENICILLIN: ICD-10-CM

## 2018-11-27 DIAGNOSIS — Z79.2 LONG TERM (CURRENT) USE OF ANTIBIOTICS: ICD-10-CM

## 2018-11-27 DIAGNOSIS — D70.2 OTHER DRUG-INDUCED AGRANULOCYTOSIS: ICD-10-CM

## 2018-11-27 DIAGNOSIS — R18.8 OTHER ASCITES: ICD-10-CM

## 2018-11-27 DIAGNOSIS — D61.810 ANTINEOPLASTIC CHEMOTHERAPY INDUCED PANCYTOPENIA: ICD-10-CM

## 2018-11-27 DIAGNOSIS — C85.90 NON-HODGKIN LYMPHOMA, UNSPECIFIED, UNSPECIFIED SITE: ICD-10-CM

## 2018-11-27 DIAGNOSIS — Z92.21 PERSONAL HISTORY OF ANTINEOPLASTIC CHEMOTHERAPY: ICD-10-CM

## 2018-11-27 DIAGNOSIS — K21.9 GASTRO-ESOPHAGEAL REFLUX DISEASE WITHOUT ESOPHAGITIS: ICD-10-CM

## 2018-11-27 DIAGNOSIS — T37.5X5A ADVERSE EFFECT OF ANTIVIRAL DRUGS, INITIAL ENCOUNTER: ICD-10-CM

## 2018-11-28 ENCOUNTER — APPOINTMENT (OUTPATIENT)
Dept: COLORECTAL SURGERY | Facility: CLINIC | Age: 83
End: 2018-11-28
Payer: MEDICARE

## 2018-11-28 VITALS
SYSTOLIC BLOOD PRESSURE: 140 MMHG | RESPIRATION RATE: 14 BRPM | TEMPERATURE: 98.7 F | HEIGHT: 64 IN | DIASTOLIC BLOOD PRESSURE: 64 MMHG

## 2018-11-28 DIAGNOSIS — K56.609 UNSPECIFIED INTESTINAL OBSTRUCTION, UNSPECIFIED AS TO PARTIAL VERSUS COMPLETE OBSTRUCTION: ICD-10-CM

## 2018-11-28 DIAGNOSIS — D61.818 OTHER PANCYTOPENIA: ICD-10-CM

## 2018-11-28 PROCEDURE — 99204 OFFICE O/P NEW MOD 45 MIN: CPT | Mod: 57

## 2018-11-28 RX ORDER — ACETAMINOPHEN AND CODEINE 300; 30 MG/1; MG/1
300-30 TABLET ORAL
Qty: 24 | Refills: 0 | Status: ACTIVE | COMMUNITY
Start: 2018-09-21

## 2018-11-28 RX ORDER — PANTOPRAZOLE 40 MG/1
40 TABLET, DELAYED RELEASE ORAL
Qty: 30 | Refills: 0 | Status: ACTIVE | COMMUNITY
Start: 2018-10-10

## 2018-11-28 RX ORDER — PREDNISONE 50 MG/1
50 TABLET ORAL
Qty: 60 | Refills: 0 | Status: ACTIVE | COMMUNITY
Start: 2018-09-11

## 2018-11-28 RX ORDER — CIPROFLOXACIN HYDROCHLORIDE 250 MG/1
250 TABLET, FILM COATED ORAL
Qty: 6 | Refills: 0 | Status: ACTIVE | COMMUNITY
Start: 2018-06-24

## 2018-11-28 RX ORDER — AMIODARONE HYDROCHLORIDE 200 MG/1
200 TABLET ORAL
Qty: 60 | Refills: 0 | Status: ACTIVE | COMMUNITY
Start: 2018-06-29

## 2018-11-28 RX ORDER — ERYTHROPOIETIN 40000 [IU]/ML
40000 INJECTION, SOLUTION INTRAVENOUS; SUBCUTANEOUS
Qty: 4 | Refills: 0 | Status: ACTIVE | COMMUNITY
Start: 2018-08-17

## 2018-11-28 RX ORDER — HYDROCORTISONE 2.5% 25 MG/G
2.5 CREAM TOPICAL
Qty: 30 | Refills: 0 | Status: ACTIVE | COMMUNITY
Start: 2018-07-16

## 2018-11-28 RX ORDER — CEFUROXIME AXETIL 500 MG/1
500 TABLET ORAL
Qty: 12 | Refills: 0 | Status: ACTIVE | COMMUNITY
Start: 2018-07-25

## 2018-11-28 RX ORDER — METRONIDAZOLE 500 MG/1
500 TABLET ORAL
Qty: 6 | Refills: 0 | Status: ACTIVE | COMMUNITY
Start: 2018-10-10

## 2018-11-28 RX ORDER — HYOSCYAMINE SULFATE 0.12 MG/1
0.12 TABLET SUBLINGUAL
Qty: 30 | Refills: 0 | Status: ACTIVE | COMMUNITY
Start: 2018-10-29

## 2018-11-28 RX ORDER — SULFAMETHOXAZOLE AND TRIMETHOPRIM 800; 160 MG/1; MG/1
800-160 TABLET ORAL
Qty: 12 | Refills: 0 | Status: ACTIVE | COMMUNITY
Start: 2018-11-06

## 2018-11-28 RX ORDER — GABAPENTIN 300 MG/1
300 CAPSULE ORAL
Qty: 60 | Refills: 0 | Status: ACTIVE | COMMUNITY
Start: 2018-10-13

## 2018-11-28 RX ORDER — METOPROLOL TARTRATE 25 MG/1
25 TABLET, FILM COATED ORAL
Qty: 60 | Refills: 0 | Status: ACTIVE | COMMUNITY
Start: 2018-06-28

## 2018-11-28 RX ORDER — BLOOD SUGAR DIAGNOSTIC
STRIP MISCELLANEOUS
Qty: 50 | Refills: 0 | Status: ACTIVE | COMMUNITY
Start: 2018-08-31

## 2018-11-28 RX ORDER — APIXABAN 2.5 MG/1
2.5 TABLET, FILM COATED ORAL
Qty: 60 | Refills: 0 | Status: ACTIVE | COMMUNITY
Start: 2018-08-07

## 2018-11-28 RX ORDER — IPRATROPIUM BROMIDE 42 UG/1
0.06 SPRAY NASAL
Qty: 15 | Refills: 0 | Status: ACTIVE | COMMUNITY
Start: 2018-08-02

## 2018-11-28 RX ORDER — METOPROLOL SUCCINATE 25 MG/1
25 TABLET, EXTENDED RELEASE ORAL
Qty: 30 | Refills: 0 | Status: ACTIVE | COMMUNITY
Start: 2018-06-28

## 2018-11-28 RX ORDER — ATORVASTATIN CALCIUM 10 MG/1
10 TABLET, FILM COATED ORAL
Qty: 30 | Refills: 0 | Status: ACTIVE | COMMUNITY
Start: 2018-06-28

## 2018-11-28 RX ORDER — CLINDAMYCIN HYDROCHLORIDE 300 MG/1
300 CAPSULE ORAL
Qty: 6 | Refills: 0 | Status: ACTIVE | COMMUNITY
Start: 2018-10-10

## 2018-11-28 RX ORDER — ACYCLOVIR 400 MG/1
400 TABLET ORAL
Qty: 60 | Refills: 0 | Status: ACTIVE | COMMUNITY
Start: 2018-11-06

## 2018-11-28 RX ORDER — ENALAPRIL MALEATE 10 MG/1
10 TABLET ORAL
Qty: 30 | Refills: 0 | Status: ACTIVE | COMMUNITY
Start: 2018-10-10

## 2018-11-28 RX ORDER — FUROSEMIDE 20 MG/1
20 TABLET ORAL
Qty: 30 | Refills: 0 | Status: ACTIVE | COMMUNITY
Start: 2018-06-29

## 2018-11-28 RX ORDER — LEVOFLOXACIN 500 MG/1
500 TABLET, FILM COATED ORAL
Qty: 7 | Refills: 0 | Status: ACTIVE | COMMUNITY
Start: 2018-08-09

## 2018-11-28 RX ORDER — VALACYCLOVIR 1 G/1
1 TABLET, FILM COATED ORAL
Qty: 30 | Refills: 0 | Status: ACTIVE | COMMUNITY
Start: 2018-09-19

## 2019-02-01 RX ORDER — CIPROFLOXACIN LACTATE 400MG/40ML
1 VIAL (ML) INTRAVENOUS
Qty: 14 | Refills: 0 | OUTPATIENT
Start: 2019-02-01 | End: 2019-02-07

## 2019-02-06 ENCOUNTER — INPATIENT (INPATIENT)
Facility: HOSPITAL | Age: 84
LOS: 4 days | Discharge: HOME | End: 2019-02-11
Attending: INTERNAL MEDICINE | Admitting: INTERNAL MEDICINE
Payer: MEDICARE

## 2019-02-06 VITALS — HEIGHT: 64 IN | WEIGHT: 113.1 LBS

## 2019-02-06 DIAGNOSIS — Z90.710 ACQUIRED ABSENCE OF BOTH CERVIX AND UTERUS: Chronic | ICD-10-CM

## 2019-02-06 DIAGNOSIS — Z90.49 ACQUIRED ABSENCE OF OTHER SPECIFIED PARTS OF DIGESTIVE TRACT: Chronic | ICD-10-CM

## 2019-02-06 LAB
ALBUMIN SERPL ELPH-MCNC: 3.4 G/DL — SIGNIFICANT CHANGE UP (ref 3.3–5)
ALP SERPL-CCNC: 129 U/L — HIGH (ref 40–120)
ALT FLD-CCNC: 16 U/L — SIGNIFICANT CHANGE UP (ref 12–78)
ANION GAP SERPL CALC-SCNC: 6 MMOL/L — SIGNIFICANT CHANGE UP (ref 5–17)
APPEARANCE UR: CLEAR — SIGNIFICANT CHANGE UP
AST SERPL-CCNC: 16 U/L — SIGNIFICANT CHANGE UP (ref 15–37)
BACTERIA # UR AUTO: ABNORMAL
BASOPHILS # BLD AUTO: 0 K/UL — SIGNIFICANT CHANGE UP (ref 0–0.2)
BASOPHILS NFR BLD AUTO: 0 % — SIGNIFICANT CHANGE UP (ref 0–2)
BILIRUB SERPL-MCNC: 0.4 MG/DL — SIGNIFICANT CHANGE UP (ref 0.2–1.2)
BILIRUB UR-MCNC: NEGATIVE — SIGNIFICANT CHANGE UP
BUN SERPL-MCNC: 43 MG/DL — HIGH (ref 7–23)
CALCIUM SERPL-MCNC: 8.5 MG/DL — SIGNIFICANT CHANGE UP (ref 8.5–10.1)
CHLORIDE SERPL-SCNC: 106 MMOL/L — SIGNIFICANT CHANGE UP (ref 96–108)
CO2 SERPL-SCNC: 25 MMOL/L — SIGNIFICANT CHANGE UP (ref 22–31)
COLOR SPEC: YELLOW — SIGNIFICANT CHANGE UP
CREAT SERPL-MCNC: 0.92 MG/DL — SIGNIFICANT CHANGE UP (ref 0.5–1.3)
DIFF PNL FLD: ABNORMAL
EOSINOPHIL # BLD AUTO: 0.01 K/UL — SIGNIFICANT CHANGE UP (ref 0–0.5)
EOSINOPHIL NFR BLD AUTO: 0.4 % — SIGNIFICANT CHANGE UP (ref 0–6)
EPI CELLS # UR: SIGNIFICANT CHANGE UP
ERYTHROCYTE [SEDIMENTATION RATE] IN BLOOD: 58 MM/HR — HIGH (ref 0–20)
GLUCOSE SERPL-MCNC: 121 MG/DL — HIGH (ref 70–99)
GLUCOSE UR QL: NEGATIVE MG/DL — SIGNIFICANT CHANGE UP
HCT VFR BLD CALC: 33.8 % — LOW (ref 34.5–45)
HGB BLD-MCNC: 10.6 G/DL — LOW (ref 11.5–15.5)
HMPV RNA SPEC QL NAA+PROBE: DETECTED
IMM GRANULOCYTES NFR BLD AUTO: 1.1 % — SIGNIFICANT CHANGE UP (ref 0–1.5)
KETONES UR-MCNC: NEGATIVE — SIGNIFICANT CHANGE UP
LACTATE SERPL-SCNC: 0.8 MMOL/L — SIGNIFICANT CHANGE UP (ref 0.7–2)
LACTATE SERPL-SCNC: 1 MMOL/L — SIGNIFICANT CHANGE UP (ref 0.7–2)
LEUKOCYTE ESTERASE UR-ACNC: NEGATIVE — SIGNIFICANT CHANGE UP
LYMPHOCYTES # BLD AUTO: 0.87 K/UL — LOW (ref 1–3.3)
LYMPHOCYTES # BLD AUTO: 30.7 % — SIGNIFICANT CHANGE UP (ref 13–44)
MANUAL SMEAR VERIFICATION: SIGNIFICANT CHANGE UP
MCHC RBC-ENTMCNC: 27.8 PG — SIGNIFICANT CHANGE UP (ref 27–34)
MCHC RBC-ENTMCNC: 31.4 GM/DL — LOW (ref 32–36)
MCV RBC AUTO: 88.7 FL — SIGNIFICANT CHANGE UP (ref 80–100)
MONOCYTES # BLD AUTO: 0.77 K/UL — SIGNIFICANT CHANGE UP (ref 0–0.9)
MONOCYTES NFR BLD AUTO: 27.2 % — HIGH (ref 2–14)
NEUTROPHILS # BLD AUTO: 1.15 K/UL — LOW (ref 1.8–7.4)
NEUTROPHILS NFR BLD AUTO: 40.6 % — LOW (ref 43–77)
NITRITE UR-MCNC: NEGATIVE — SIGNIFICANT CHANGE UP
NRBC # BLD: 0 /100 WBCS — SIGNIFICANT CHANGE UP (ref 0–0)
PH UR: 6 — SIGNIFICANT CHANGE UP (ref 5–8)
PLAT MORPH BLD: NORMAL — SIGNIFICANT CHANGE UP
PLATELET # BLD AUTO: 127 K/UL — LOW (ref 150–400)
POTASSIUM SERPL-MCNC: 3.8 MMOL/L — SIGNIFICANT CHANGE UP (ref 3.5–5.3)
POTASSIUM SERPL-SCNC: 3.8 MMOL/L — SIGNIFICANT CHANGE UP (ref 3.5–5.3)
PROT SERPL-MCNC: 7.6 GM/DL — SIGNIFICANT CHANGE UP (ref 6–8.3)
PROT UR-MCNC: NEGATIVE MG/DL — SIGNIFICANT CHANGE UP
RAPID RVP RESULT: DETECTED
RBC # BLD: 3.81 M/UL — SIGNIFICANT CHANGE UP (ref 3.8–5.2)
RBC # FLD: 15.7 % — HIGH (ref 10.3–14.5)
RBC BLD AUTO: SIGNIFICANT CHANGE UP
RBC CASTS # UR COMP ASSIST: SIGNIFICANT CHANGE UP /HPF (ref 0–4)
SODIUM SERPL-SCNC: 137 MMOL/L — SIGNIFICANT CHANGE UP (ref 135–145)
SP GR SPEC: 1 — LOW (ref 1.01–1.02)
UROBILINOGEN FLD QL: NEGATIVE MG/DL — SIGNIFICANT CHANGE UP
WBC # BLD: 2.83 K/UL — LOW (ref 3.8–10.5)
WBC # FLD AUTO: 2.83 K/UL — LOW (ref 3.8–10.5)
WBC UR QL: SIGNIFICANT CHANGE UP

## 2019-02-06 PROCEDURE — 73502 X-RAY EXAM HIP UNI 2-3 VIEWS: CPT | Mod: 26,LT

## 2019-02-06 PROCEDURE — 93010 ELECTROCARDIOGRAM REPORT: CPT

## 2019-02-06 PROCEDURE — 71045 X-RAY EXAM CHEST 1 VIEW: CPT | Mod: 26

## 2019-02-06 PROCEDURE — 72192 CT PELVIS W/O DYE: CPT | Mod: 26

## 2019-02-06 PROCEDURE — 99285 EMERGENCY DEPT VISIT HI MDM: CPT | Mod: 25

## 2019-02-06 PROCEDURE — 73552 X-RAY EXAM OF FEMUR 2/>: CPT | Mod: 26,LT

## 2019-02-06 PROCEDURE — 76376 3D RENDER W/INTRP POSTPROCES: CPT | Mod: 26

## 2019-02-06 RX ORDER — SODIUM CHLORIDE 9 MG/ML
1000 INJECTION INTRAMUSCULAR; INTRAVENOUS; SUBCUTANEOUS ONCE
Qty: 0 | Refills: 0 | Status: COMPLETED | OUTPATIENT
Start: 2019-02-06 | End: 2019-02-06

## 2019-02-06 RX ORDER — TRAMADOL HYDROCHLORIDE 50 MG/1
25 TABLET ORAL ONCE
Qty: 0 | Refills: 0 | Status: DISCONTINUED | OUTPATIENT
Start: 2019-02-06 | End: 2019-02-06

## 2019-02-06 RX ORDER — ACETAMINOPHEN 500 MG
1000 TABLET ORAL ONCE
Qty: 0 | Refills: 0 | Status: COMPLETED | OUTPATIENT
Start: 2019-02-06 | End: 2019-02-06

## 2019-02-06 RX ADMIN — TRAMADOL HYDROCHLORIDE 25 MILLIGRAM(S): 50 TABLET ORAL at 21:32

## 2019-02-06 RX ADMIN — TRAMADOL HYDROCHLORIDE 25 MILLIGRAM(S): 50 TABLET ORAL at 22:10

## 2019-02-06 RX ADMIN — Medication 400 MILLIGRAM(S): at 18:34

## 2019-02-06 RX ADMIN — SODIUM CHLORIDE 2000 MILLILITER(S): 9 INJECTION INTRAMUSCULAR; INTRAVENOUS; SUBCUTANEOUS at 18:34

## 2019-02-06 NOTE — H&P ADULT - HISTORY OF PRESENT ILLNESS
85 y/o F PMHx significant for hypertension, hyperlipidemia, diabetes mellitus type 2, NH lymphoma, s/p 4 cycles R-CHOP (last PET scan in 11/2018 was reportedly (-)), GERD, hx of recurrent SBO presents to the ED for further evaluation of intractable left hip pain (10/10) x 1 day. The patient denies any associated recent trauma. Th patient's pain progressively worsened this morning (2/6) and has reportedly not been able to ambulate since 2/2 pain. As per the patient's son, the patient is able to ambulate unassisted at baseline. Of note the patient was recently prescribed a 7 day regimen of Ciprofloxacin 250mg po bid (started on 2/1) for treatment of a "cough." Labs => ESR 58, WBC 2.83, Hgb/Hct 10.6/33.8, , BUN/Cr 43/0.92, RVP (+)hMPV. CT Pelvis => No fracture or dislocation. In the ED Orthopedics was consulted. 83 y/o F PMHx significant for hypertension, hyperlipidemia, diabetes mellitus type 2, NH lymphoma, s/p 4 cycles R-CHOP (last PET scan in 11/2018 was reportedly (-)), GERD, hx of recurrent SBO presents to the ED for further evaluation of intractable left hip pain (10/10) x 1 day. The patient denies any associated recent trauma. The patient's pain progressively worsened this morning (2/6) and has reportedly not been able to ambulate since 2/2 pain. As per the patient's son, the patient is able to ambulate unassisted at baseline. Of note the patient was recently prescribed a 7 day regimen of Ciprofloxacin 250mg po bid (started on 2/1) for treatment of a "cough." Labs => ESR 58, WBC 2.83, Hgb/Hct 10.6/33.8, , BUN/Cr 43/0.92, RVP (+)hMPV. CT Pelvis => No fracture or dislocation. In the ED Orthopedics was consulted.

## 2019-02-06 NOTE — CONSULT NOTE ADULT - SUBJECTIVE AND OBJECTIVE BOX
84y Female community ambulator presents c/o atraumatic L hip pain. Alexx started 2 days ago and has progressively gotten worse. Son is at bedside patient is R[place] speaking. He provided translation. Patient has trouble with ambulation 2/2 pain and was able to stand but unable to take steps with son and my assistance at bedside. Patient has been in remission s/p Chemotherapy which finished in august. Per son she had a PET scan at the end of november which was negative. Patient has mostly lateral pain over the IT band from the hip to mid thigh, but also complains of pain in the groin with ROM of the hip. Patient had a 1 time temperature rectally of 101 but has been afebrile on all other measurements Denies HS/LOC. Denies numbness/tingling.  Denies pain/injury elsewhere.     Patient was recently started on Cipro for a cough by her hematologist 3 days ago.     PMHx of Lymphoma, last chemo treatment in 08/2018, HTN, HLD, Type II DM, SBO, and GERD Pain worsened this morning, severely limited ability to ambulate secondary to pain. Able to walk without assistance at baseline.  Allergic to Penicillin. Oncologist: Dr. Polanco in [place].        PAST MEDICAL & SURGICAL HISTORY:  CKD (chronic kidney disease) stage 3, GFR 30-59 ml/min  Anemia, unspecified type  GERD (gastroesophageal reflux disease)  High cholesterol  Diabetes  HTN (hypertension)  H/O: hysterectomy  SBO    ESR: 58                   10.6   2.83  )-----------( 127      ( 06 Feb 2019 18:05 )             33.8     06 Feb 2019 18:05    137    |  106    |  43     ----------------------------<  121    3.8     |  25     |  0.92     Ca    8.5        06 Feb 2019 18:05    TPro  7.6    /  Alb  3.4    /  TBili  0.4    /  DBili  x      /  AST  16     /  ALT  16     /  AlkPhos  129    06 Feb 2019 18:05      Vital Signs Last 24 Hrs  T(C): 37.3 (02-06-19 @ 20:49), Max: 38.3 (02-06-19 @ 18:08)  T(F): 99.2 (02-06-19 @ 20:49), Max: 101 (02-06-19 @ 18:08)  HR: 80 (02-06-19 @ 20:49) (79 - 80)  BP: 128/56 (02-06-19 @ 20:49) (128/56 - 134/48)  BP(mean): --  RR: 16 (02-06-19 @ 20:49) (16 - 16)  SpO2: 98% (02-06-19 @ 20:49) (98% - 100%)    Imaging: XR Hip/Pelvis/Femur: Pending  CT Pelvis: No Fxs/Lesions seen on CT    Physical Exam  General: NAD, Alert, Awake and oriented  Neurologic: No gross deficits, moving all 4s.  Head: NCAT without abrasions, lacerations, or ecchymosis to head, face, or scalp  Eyes: PERRL  Neck/C-Spine: FAROM. No bony TTP.  T/L Spine: No bony TTP, no palpable step-off.    HIPS and PELVIS: Able to SLR Left Hip but with pain.     LEFT LE:  No open skin. No deformities or other signs of trauma at hip, knee, lower leg, ankle or foot. +TTP over lateral greater troch and femur over IT band, Pain with Hip ROM laterally and in groin, +Pain with Log Roll, No pain with Healstrike, FROM at Knee, ankle and toes. SILT toes 1-5. + DP/PT pulses palpable with brisk cap refill distally. Compartments soft and compressible. EHL/FHL/TA/GS intact symmetric bilaterally.      A/P: 84y Female with Atraumatic Left hip pain x 2 days  Patient having difficulty ambulating 2/2 pain  FU Plain Film Xrays of Pelvis/Hip/Femur (Left)  FU CRP  Possibly advanced imaging to r/o Lesion/Fracture/Hip Effusion  Hold Abx at this time  Pain control  WBAT  Dispo Home vs admit pending results of imaging 84y Female community ambulator presents c/o atraumatic L hip pain. Alexx started 2 days ago and has progressively gotten worse. Son is at bedside patient is [place] Zealander speaking. He provided translation. Patient has trouble with ambulation 2/2 pain and was able to stand but unable to take steps with son and my assistance at bedside. Patient has been in remission s/p Chemotherapy which finished in august. Per son she had a PET scan at the end of november which was negative. Patient has mostly lateral pain over the IT band from the hip to mid thigh, but also complains of pain in the groin with ROM of the hip. Patient had a 1 time temperature rectally of 101 but has been afebrile on all other measurements Denies HS/LOC. Denies numbness/tingling.  Denies pain/injury elsewhere.     Patient was recently started on Cipro for a cough by her hematologist 3 days ago.     PMHx of Lymphoma, last chemo treatment in 08/2018, HTN, HLD, Type II DM, SBO, and GERD Pain worsened this morning, severely limited ability to ambulate secondary to pain. Able to walk without assistance at baseline.  Allergic to Penicillin. Oncologist: Dr. Polanco in [place].        PAST MEDICAL & SURGICAL HISTORY:  CKD (chronic kidney disease) stage 3, GFR 30-59 ml/min  Anemia, unspecified type  GERD (gastroesophageal reflux disease)  High cholesterol  Diabetes  HTN (hypertension)  H/O: hysterectomy  SBO    ESR: 58                   10.6   2.83  )-----------( 127      ( 06 Feb 2019 18:05 )             33.8     06 Feb 2019 18:05    137    |  106    |  43     ----------------------------<  121    3.8     |  25     |  0.92     Ca    8.5        06 Feb 2019 18:05    TPro  7.6    /  Alb  3.4    /  TBili  0.4    /  DBili  x      /  AST  16     /  ALT  16     /  AlkPhos  129    06 Feb 2019 18:05      Vital Signs Last 24 Hrs  T(C): 37.3 (02-06-19 @ 20:49), Max: 38.3 (02-06-19 @ 18:08)  T(F): 99.2 (02-06-19 @ 20:49), Max: 101 (02-06-19 @ 18:08)  HR: 80 (02-06-19 @ 20:49) (79 - 80)  BP: 128/56 (02-06-19 @ 20:49) (128/56 - 134/48)  BP(mean): --  RR: 16 (02-06-19 @ 20:49) (16 - 16)  SpO2: 98% (02-06-19 @ 20:49) (98% - 100%)    Imaging: XR Hip/Pelvis/Femur: Pending  CT Pelvis: No Fxs/Lesions seen on CT    Physical Exam  General: NAD, Alert, Awake and oriented  Neurologic: No gross deficits, moving all 4s.  Head: NCAT without abrasions, lacerations, or ecchymosis to head, face, or scalp  Eyes: PERRL  Neck/C-Spine: FAROM. No bony TTP.  T/L Spine: No bony TTP, no palpable step-off.    HIPS and PELVIS: Able to SLR Left Hip but with pain.     LEFT LE:  No open skin. No deformities or other signs of trauma at hip, knee, lower leg, ankle or foot. +TTP over lateral greater troch and femur over IT band, Pain with Hip ROM laterally and in groin, +Pain with Log Roll, No pain with Healstrike, FROM at Knee, ankle and toes. SILT toes 1-5. + DP/PT pulses palpable with brisk cap refill distally. Compartments soft and compressible. EHL/FHL/TA/GS intact symmetric bilaterally.      A/P: 84y Female with Atraumatic Left hip pain x 2 days  Patient having difficulty ambulating 2/2 pain  FU Plain Film Xrays of Pelvis/Hip/Femur (Left)  FU CRP  Xrays don't show anything remarkable, FU official ready  MRI ordered to R/O Left Hip Effusion given elevated ESR vs occult fracture/lesion  Hold Abx at this time  Pain control  NWB LLE until MRI performed  Plan pending [facility]

## 2019-02-06 NOTE — ED STATDOCS - MUSCULOSKELETAL, MLM
TTP of left iliac crest. Pain with passive flexion and ROM. No erythema or induration. Able to bear weight with assistance. Spine appears normal.

## 2019-02-06 NOTE — H&P ADULT - PROBLEM SELECTOR PLAN 6
~cont. Enalapril 10mg po daily  ~cont. Metoprolol ER 25mg po daily ~patient's advanced directives were discussed with the patient and patient's son at the bedside. The patient states that her wishes are to be DNR/DNI. The patient is agreeable to IV hydration, IV antibiotics, and future hospitalizations. The patient is not agreeable to a feeding tube. MOLST form completed and placed in patient's chart.  Total Time; 16 minutes

## 2019-02-06 NOTE — H&P ADULT - MUSCULOSKELETAL
details… detailed exam decreased ROM due to pain/Able to SLR Left Hip but with pain. (+)TTP Left Hip

## 2019-02-06 NOTE — ED ADULT TRIAGE NOTE - CHIEF COMPLAINT QUOTE
L hip/leg pain. Denies injury denies redness swelling. Pt state she was having mild pain yesterday and woke up today with pain 10/10. Denies numbness/tingling

## 2019-02-06 NOTE — H&P ADULT - FAMILY HISTORY
Mother  Still living? Unknown  Family history of diabetes mellitus, Age at diagnosis: Age Unknown     Father  Still living? Unknown  Family history of early CAD, Age at diagnosis: Age Unknown

## 2019-02-06 NOTE — H&P ADULT - PMH
Anemia, unspecified type    CKD (chronic kidney disease) stage 3, GFR 30-59 ml/min    Diabetes  Type 2  GERD (gastroesophageal reflux disease)    High cholesterol    HTN (hypertension)

## 2019-02-06 NOTE — ED STATDOCS - PROGRESS NOTE DETAILS
signed Joanna Cevallos PA-C Pt seen initially in intake by Dr Moura. Pt declines  services.   84F here with her son for atraumatic left hip pain, worsening since this morning, now unable to wt bear or walk. PMH lymphoma s/p last chemo in August and HTN. Son notes that pts other medical conditons resolved after the treatments ended. Pt alert, NAD, though coughing with a fluid sounding cough. Son notes cough seems to be improving since she was placed on cipro by her heme/onc on 2/1. + sick contacts. left hip no erythema or swelling, no TTP. Some pain with flexion and extension, pain is worse with rotation and abduction. Gross motor/sensation intact, 2+ DP pulse. Pt febrile 101 rectal. Plan labs, cx, UA, cxr, RVP, pelvic CT. Pt agrees with plan of  care. PMD [place] Heme/onc Sherri signed Joanna Cevallos PA-C   No significant findings on imaging. Still awaiting RVP. ESR somewhat elevated, AST and BUN slightly elevated as well compared to prior labs. Pt feeling a little better but still having pain in left hip. Spoke to ortho resident Dorian who will see pt in ED in consult, requests xrays hip/pelvis/femur. Pt agrees with plan of  care. No apparent uti. No significant findings on chest xray. signed Joanna Cevallos PA-C Pt seen in ED by ortho resident Dorian. Pt unable to wt bear at all. Recommends admission for inpt MRI. Pt agrees with admission and plan of care. RVP + for human metapneumovirus. Pts vital stable, cough improving over past week. No intervention at this time for virus.

## 2019-02-06 NOTE — H&P ADULT - PROBLEM SELECTOR PLAN 3
~as per patient's son the patient has remained well controlled and has been off of all oral hypoglycemics 2/2 weight loss

## 2019-02-06 NOTE — H&P ADULT - ASSESSMENT
83 y/o F PMHx significant for hypertension, hyperlipidemia, diabetes mellitus type 2, NH lymphoma, s/p 4 cycles R-CHOP (last PET scan in 11/2018 was reportedly (-)), GERD, hx of recurrent SBO presents to the ED for further evaluation of intractable left hip pain (10/10) x 1 day. The patient denies any associated recent trauma. Th patient's pain progressively worsened this morning (2/6) and has reportedly not been able to ambulate since 2/2 pain. As per the patient's son, the patient is able to ambulate unassisted at baseline. Of note the patient was recently prescribed a 7 day regimen of Ciprofloxacin 250mg po bid (started on 2/1) for treatment of a "cough." Labs => ESR 58, WBC 2.83, Hgb/Hct 10.6/33.8, , BUN/Cr 43/0.92, RVP (+)hMPV. CT Pelvis => No fracture or dislocation. In the ED Orthopedics was consulted. 85 y/o F PMHx significant for hypertension, hyperlipidemia, diabetes mellitus type 2, NH lymphoma, s/p 4 cycles R-CHOP (last PET scan in 11/2018 was reportedly (-)), GERD, hx of recurrent SBO presents to the ED for further evaluation of intractable left hip pain (10/10) x 1 day. The patient denies any associated recent trauma. The patient's pain progressively worsened this morning (2/6) and has reportedly not been able to ambulate since 2/2 pain. As per the patient's son, the patient is able to ambulate unassisted at baseline. Of note the patient was recently prescribed a 7 day regimen of Ciprofloxacin 250mg po bid (started on 2/1) for treatment of a "cough." Labs => ESR 58, WBC 2.83, Hgb/Hct 10.6/33.8, , BUN/Cr 43/0.92, RVP (+)hMPV. CT Pelvis => No fracture or dislocation. In the ED Orthopedics was consulted. 85 y/o F PMHx significant for hypertension, hyperlipidemia, diabetes mellitus type 2 (now off all oral hypoglycemics), NH lymphoma, s/p 4 cycles R-CHOP (last PET scan in 11/2018 was reportedly (-)), GERD, hx of recurrent SBO presents to the ED for further evaluation of intractable left hip pain (10/10) x 1 day. The patient denies any associated recent trauma. The patient's pain progressively worsened this morning (2/6) and has reportedly not been able to ambulate since 2/2 pain. As per the patient's son, the patient is able to ambulate unassisted at baseline. Of note the patient was recently prescribed a 7 day regimen of Ciprofloxacin 250mg po bid (started on 2/1) for treatment of a "cough." Labs => ESR 58, WBC 2.83, Hgb/Hct 10.6/33.8, , BUN/Cr 43/0.92, RVP (+)hMPV. CT Pelvis => No fracture or dislocation. In the ED Orthopedics was consulted.

## 2019-02-06 NOTE — ED STATDOCS - ATTENDING CONTRIBUTION TO CARE
I, Joaquin Moura, performed the initial face to face bedside interview with this patient regarding history of present illness, review of symptoms and relevant past medical, social and family history.  I completed an independent physical examination.  I was the initial provider who evaluated this patient. I have signed out the follow up of any pending tests (i.e. labs, radiological studies) to the ACP.  I have communicated the patient’s plan of care and disposition with the ACP.  The history, relevant review of systems, past medical and surgical history, medical decision making, and physical examination was documented by the scribe in my presence and I attest to the accuracy of the documentation.

## 2019-02-06 NOTE — H&P ADULT - PROBLEM SELECTOR PLAN 2
~f/u w/ Orthopedics in the am  ~Ddx includes left hip effusion vs occult fracture/lesion  ~f/u CRP  ~f/u MRI Left Hip  ~as per Orthopedics will hold Abx at this time  ~cont. pain management  ~NWB LLE until MRI performed

## 2019-02-06 NOTE — ED ADULT NURSE NOTE - NSIMPLEMENTINTERV_GEN_ALL_ED
Implemented All Universal Safety Interventions:  Joseph to call system. Call bell, personal items and telephone within reach. Instruct patient to call for assistance. Room bathroom lighting operational. Non-slip footwear when patient is off stretcher. Physically safe environment: no spills, clutter or unnecessary equipment. Stretcher in lowest position, wheels locked, appropriate side rails in place.

## 2019-02-06 NOTE — H&P ADULT - NSHPPHYSICALEXAM_GEN_ALL_CORE
Vital Signs Last 24 Hrs  T(C): 37.3 (06 Feb 2019 20:49), Max: 38.3 (06 Feb 2019 18:08)  T(F): 99.2 (06 Feb 2019 20:49), Max: 101 (06 Feb 2019 18:08)  HR: 80 (06 Feb 2019 20:49) (79 - 80)  BP: 128/56 (06 Feb 2019 20:49) (128/56 - 134/48)  RR: 16 (06 Feb 2019 20:49) (16 - 16)  SpO2: 98% (06 Feb 2019 20:49) (98% - 100%)

## 2019-02-07 DIAGNOSIS — E11.9 TYPE 2 DIABETES MELLITUS WITHOUT COMPLICATIONS: ICD-10-CM

## 2019-02-07 DIAGNOSIS — Z71.89 OTHER SPECIFIED COUNSELING: ICD-10-CM

## 2019-02-07 DIAGNOSIS — I10 ESSENTIAL (PRIMARY) HYPERTENSION: ICD-10-CM

## 2019-02-07 DIAGNOSIS — K21.9 GASTRO-ESOPHAGEAL REFLUX DISEASE WITHOUT ESOPHAGITIS: ICD-10-CM

## 2019-02-07 DIAGNOSIS — M25.552 PAIN IN LEFT HIP: ICD-10-CM

## 2019-02-07 DIAGNOSIS — B97.81 HUMAN METAPNEUMOVIRUS AS THE CAUSE OF DISEASES CLASSIFIED ELSEWHERE: ICD-10-CM

## 2019-02-07 DIAGNOSIS — E78.00 PURE HYPERCHOLESTEROLEMIA, UNSPECIFIED: ICD-10-CM

## 2019-02-07 DIAGNOSIS — Z29.9 ENCOUNTER FOR PROPHYLACTIC MEASURES, UNSPECIFIED: ICD-10-CM

## 2019-02-07 LAB
ALBUMIN SERPL ELPH-MCNC: 3 G/DL — LOW (ref 3.3–5)
ALP SERPL-CCNC: 136 U/L — HIGH (ref 40–120)
ALT FLD-CCNC: 14 U/L — SIGNIFICANT CHANGE UP (ref 12–78)
ANION GAP SERPL CALC-SCNC: 7 MMOL/L — SIGNIFICANT CHANGE UP (ref 5–17)
AST SERPL-CCNC: 12 U/L — LOW (ref 15–37)
B PERT IGG+IGM PNL SER: ABNORMAL
BASOPHILS # BLD AUTO: 0 K/UL — SIGNIFICANT CHANGE UP (ref 0–0.2)
BASOPHILS NFR BLD AUTO: 0 % — SIGNIFICANT CHANGE UP (ref 0–2)
BILIRUB SERPL-MCNC: 0.7 MG/DL — SIGNIFICANT CHANGE UP (ref 0.2–1.2)
BLD GP AB SCN SERPL QL: SIGNIFICANT CHANGE UP
BUN SERPL-MCNC: 26 MG/DL — HIGH (ref 7–23)
CALCIUM SERPL-MCNC: 8.4 MG/DL — LOW (ref 8.5–10.1)
CHLORIDE SERPL-SCNC: 108 MMOL/L — SIGNIFICANT CHANGE UP (ref 96–108)
CO2 SERPL-SCNC: 25 MMOL/L — SIGNIFICANT CHANGE UP (ref 22–31)
COLOR FLD: YELLOW — SIGNIFICANT CHANGE UP
CREAT SERPL-MCNC: 0.84 MG/DL — SIGNIFICANT CHANGE UP (ref 0.5–1.3)
CRP SERPL-MCNC: 5.42 MG/DL — HIGH (ref 0–0.4)
EOSINOPHIL # BLD AUTO: 0 K/UL — SIGNIFICANT CHANGE UP (ref 0–0.5)
EOSINOPHIL NFR BLD AUTO: 0 % — SIGNIFICANT CHANGE UP (ref 0–6)
FLUID INTAKE SUBSTANCE CLASS: SIGNIFICANT CHANGE UP
GLUCOSE SERPL-MCNC: 93 MG/DL — SIGNIFICANT CHANGE UP (ref 70–99)
HCT VFR BLD CALC: 33.8 % — LOW (ref 34.5–45)
HGB BLD-MCNC: 10.6 G/DL — LOW (ref 11.5–15.5)
IMM GRANULOCYTES NFR BLD AUTO: 0.4 % — SIGNIFICANT CHANGE UP (ref 0–1.5)
LYMPHOCYTES # BLD AUTO: 0.78 K/UL — LOW (ref 1–3.3)
LYMPHOCYTES # BLD AUTO: 30.6 % — SIGNIFICANT CHANGE UP (ref 13–44)
MAGNESIUM SERPL-MCNC: 2 MG/DL — SIGNIFICANT CHANGE UP (ref 1.6–2.6)
MCHC RBC-ENTMCNC: 27.6 PG — SIGNIFICANT CHANGE UP (ref 27–34)
MCHC RBC-ENTMCNC: 31.4 GM/DL — LOW (ref 32–36)
MCV RBC AUTO: 88 FL — SIGNIFICANT CHANGE UP (ref 80–100)
MONOCYTES # BLD AUTO: 0.57 K/UL — SIGNIFICANT CHANGE UP (ref 0–0.9)
MONOCYTES NFR BLD AUTO: 22.4 % — HIGH (ref 2–14)
NEUTROPHILS # BLD AUTO: 1.19 K/UL — LOW (ref 1.8–7.4)
NEUTROPHILS NFR BLD AUTO: 46.6 % — SIGNIFICANT CHANGE UP (ref 43–77)
NRBC # BLD: 0 /100 WBCS — SIGNIFICANT CHANGE UP (ref 0–0)
PLATELET # BLD AUTO: 118 K/UL — LOW (ref 150–400)
POTASSIUM SERPL-MCNC: 3.5 MMOL/L — SIGNIFICANT CHANGE UP (ref 3.5–5.3)
POTASSIUM SERPL-SCNC: 3.5 MMOL/L — SIGNIFICANT CHANGE UP (ref 3.5–5.3)
PROT SERPL-MCNC: 7.1 GM/DL — SIGNIFICANT CHANGE UP (ref 6–8.3)
RBC # BLD: 3.84 M/UL — SIGNIFICANT CHANGE UP (ref 3.8–5.2)
RBC # FLD: 15.7 % — HIGH (ref 10.3–14.5)
RCV VOL RI: 7000 /UL — HIGH (ref 0–5)
SODIUM SERPL-SCNC: 140 MMOL/L — SIGNIFICANT CHANGE UP (ref 135–145)
SPECIMEN SOURCE FLD: SIGNIFICANT CHANGE UP
SYNOVIAL CRYSTALS CLARITY: ABNORMAL
SYNOVIAL CRYSTALS COLOR: YELLOW
SYNOVIAL CRYSTALS ID: SIGNIFICANT CHANGE UP
SYNOVIAL CRYSTALS TUBE: SIGNIFICANT CHANGE UP
TOTAL NUCLEATED CELL COUNT, BODY FLUID: 7161 /UL — HIGH (ref 0–5)
TUBE TYPE: SIGNIFICANT CHANGE UP
TYPE + AB SCN PNL BLD: SIGNIFICANT CHANGE UP
WBC # BLD: 2.55 K/UL — LOW (ref 3.8–10.5)
WBC # FLD AUTO: 2.55 K/UL — LOW (ref 3.8–10.5)

## 2019-02-07 PROCEDURE — 72195 MRI PELVIS W/O DYE: CPT | Mod: 26

## 2019-02-07 PROCEDURE — 77002 NEEDLE LOCALIZATION BY XRAY: CPT | Mod: 26

## 2019-02-07 PROCEDURE — 20610 DRAIN/INJ JOINT/BURSA W/O US: CPT | Mod: LT

## 2019-02-07 RX ORDER — PANTOPRAZOLE SODIUM 20 MG/1
40 TABLET, DELAYED RELEASE ORAL
Qty: 0 | Refills: 0 | Status: DISCONTINUED | OUTPATIENT
Start: 2019-02-07 | End: 2019-02-11

## 2019-02-07 RX ORDER — PANTOPRAZOLE SODIUM 20 MG/1
1 TABLET, DELAYED RELEASE ORAL
Qty: 0 | Refills: 0 | COMMUNITY

## 2019-02-07 RX ORDER — DOCUSATE SODIUM 100 MG
100 CAPSULE ORAL THREE TIMES A DAY
Qty: 0 | Refills: 0 | Status: DISCONTINUED | OUTPATIENT
Start: 2019-02-07 | End: 2019-02-11

## 2019-02-07 RX ORDER — SODIUM CHLORIDE 9 MG/ML
1000 INJECTION, SOLUTION INTRAVENOUS
Qty: 0 | Refills: 0 | Status: COMPLETED | OUTPATIENT
Start: 2019-02-07 | End: 2019-02-07

## 2019-02-07 RX ORDER — ACETAMINOPHEN 500 MG
650 TABLET ORAL EVERY 6 HOURS
Qty: 0 | Refills: 0 | Status: DISCONTINUED | OUTPATIENT
Start: 2019-02-07 | End: 2019-02-11

## 2019-02-07 RX ORDER — SENNA PLUS 8.6 MG/1
2 TABLET ORAL AT BEDTIME
Qty: 0 | Refills: 0 | Status: DISCONTINUED | OUTPATIENT
Start: 2019-02-07 | End: 2019-02-11

## 2019-02-07 RX ORDER — TRAMADOL HYDROCHLORIDE 50 MG/1
25 TABLET ORAL ONCE
Qty: 0 | Refills: 0 | Status: DISCONTINUED | OUTPATIENT
Start: 2019-02-07 | End: 2019-02-11

## 2019-02-07 RX ORDER — METOPROLOL TARTRATE 50 MG
1 TABLET ORAL
Qty: 0 | Refills: 0 | COMMUNITY

## 2019-02-07 RX ORDER — HEPARIN SODIUM 5000 [USP'U]/ML
5000 INJECTION INTRAVENOUS; SUBCUTANEOUS EVERY 12 HOURS
Qty: 0 | Refills: 0 | Status: DISCONTINUED | OUTPATIENT
Start: 2019-02-07 | End: 2019-02-07

## 2019-02-07 RX ORDER — METOPROLOL TARTRATE 50 MG
25 TABLET ORAL DAILY
Qty: 0 | Refills: 0 | Status: DISCONTINUED | OUTPATIENT
Start: 2019-02-07 | End: 2019-02-11

## 2019-02-07 RX ORDER — MORPHINE SULFATE 50 MG/1
2 CAPSULE, EXTENDED RELEASE ORAL EVERY 6 HOURS
Qty: 0 | Refills: 0 | Status: DISCONTINUED | OUTPATIENT
Start: 2019-02-07 | End: 2019-02-11

## 2019-02-07 RX ORDER — CIPROFLOXACIN LACTATE 400MG/40ML
250 VIAL (ML) INTRAVENOUS EVERY 12 HOURS
Qty: 0 | Refills: 0 | Status: DISCONTINUED | OUTPATIENT
Start: 2019-02-07 | End: 2019-02-07

## 2019-02-07 RX ORDER — HYOSCYAMINE SULFATE 0.13 MG
1 TABLET ORAL
Qty: 0 | Refills: 0 | COMMUNITY

## 2019-02-07 RX ORDER — ONDANSETRON 8 MG/1
4 TABLET, FILM COATED ORAL EVERY 6 HOURS
Qty: 0 | Refills: 0 | Status: DISCONTINUED | OUTPATIENT
Start: 2019-02-07 | End: 2019-02-11

## 2019-02-07 RX ADMIN — MORPHINE SULFATE 2 MILLIGRAM(S): 50 CAPSULE, EXTENDED RELEASE ORAL at 08:20

## 2019-02-07 RX ADMIN — MORPHINE SULFATE 2 MILLIGRAM(S): 50 CAPSULE, EXTENDED RELEASE ORAL at 22:04

## 2019-02-07 RX ADMIN — Medication 650 MILLIGRAM(S): at 16:27

## 2019-02-07 RX ADMIN — SODIUM CHLORIDE 75 MILLILITER(S): 9 INJECTION, SOLUTION INTRAVENOUS at 02:29

## 2019-02-07 RX ADMIN — MORPHINE SULFATE 2 MILLIGRAM(S): 50 CAPSULE, EXTENDED RELEASE ORAL at 07:50

## 2019-02-07 RX ADMIN — PANTOPRAZOLE SODIUM 40 MILLIGRAM(S): 20 TABLET, DELAYED RELEASE ORAL at 06:00

## 2019-02-07 RX ADMIN — MORPHINE SULFATE 2 MILLIGRAM(S): 50 CAPSULE, EXTENDED RELEASE ORAL at 02:28

## 2019-02-07 RX ADMIN — Medication 25 MILLIGRAM(S): at 06:00

## 2019-02-07 RX ADMIN — Medication 650 MILLIGRAM(S): at 09:18

## 2019-02-07 RX ADMIN — MORPHINE SULFATE 2 MILLIGRAM(S): 50 CAPSULE, EXTENDED RELEASE ORAL at 02:43

## 2019-02-07 RX ADMIN — MORPHINE SULFATE 2 MILLIGRAM(S): 50 CAPSULE, EXTENDED RELEASE ORAL at 15:47

## 2019-02-07 NOTE — PROGRESS NOTE ADULT - SUBJECTIVE AND OBJECTIVE BOX
2/7 Patient seen and examined, Son at bedside who is PA. Patient still having Left Hip Pain, Able to flex hip but has pain with terminal flexion/ Extension, MRI Ordered to R/O Occult fracture vs effusion    84y Female community ambulator presents c/o atraumatic L hip pain. Alexx started 2 days ago and has progressively gotten worse. Son is at bedside patient is Nepali speaking. He provided translation. Patient has trouble with ambulation 2/2 pain and was able to stand but unable to take steps with son and my assistance at bedside. Patient has been in remission s/p Chemotherapy which finished in august. Per son she had a PET scan at the end of november which was negative. Patient has mostly lateral pain over the IT band from the hip to mid thigh, but also complains of pain in the groin with ROM of the hip. Patient had a 1 time temperature rectally of 101 but has been afebrile on all other measurements Denies HS/LOC. Denies numbness/tingling.  Denies pain/injury elsewhere.     Vital Signs Last 24 Hrs  T(C): 36.9 (07 Feb 2019 05:59), Max: 38.3 (06 Feb 2019 18:08)  T(F): 98.4 (07 Feb 2019 05:59), Max: 101 (06 Feb 2019 18:08)  HR: 82 (07 Feb 2019 05:59) (79 - 82)  BP: 123/58 (07 Feb 2019 05:59) (123/58 - 136/50)  BP(mean): --  RR: 16 (07 Feb 2019 05:59) (16 - 16)  SpO2: 97% (07 Feb 2019 05:59) (95% - 100%)    Physical Exam  General: NAD, Alert, Awake and oriented  Neurologic: No gross deficits, moving all 4s.  Head: NCAT without abrasions, lacerations, or ecchymosis to head, face, or scalp  Eyes: PERRL  Neck/C-Spine: FAROM. No bony TTP.  T/L Spine: No bony TTP, no palpable step-off.    HIPS and PELVIS: Able to SLR Left Hip but with pain.     LEFT LE:  No open skin. No deformities or other signs of trauma at hip, knee, lower leg, ankle or foot. +TTP over lateral greater troch and femur over IT band, Pain with Hip ROM laterally and in groin, +Pain with Log Roll, No pain with Heal strike, FROM at Knee, ankle and toes. SILT toes 1-5. + DP/PT pulses palpable with brisk cap refill distally. Compartments soft and compressible. EHL/FHL/TA/GS intact symmetric bilaterally.      A/P: 84y Female with Atraumatic Left hip pain x 2 days  FU Plain X-ray official Read, no obvious lesions/fracture  Patient to get MRI L Hip this AM, if +Effusion with elevated ESR and Pain, will need IR Aspiration of left hip to r/o infection  Will also be able to r/o occult fracture of left hip with MRI  NWB LLE Until MRI completed and shows no fracture  NPO, if patient has effusion that needs aspiration should stay NPO incase any intervention is needed  Hold AntiCoagulation at this time  Hold all antibiotics until MRI done and effusion ruled out  Discussed with Dr. Hensley who agrees with Plan

## 2019-02-07 NOTE — BRIEF OPERATIVE NOTE - PROCEDURE
<<-----Click on this checkbox to enter Procedure Joint aspiration  02/07/2019  L Hip  Active  JAMISON

## 2019-02-07 NOTE — ED ADULT NURSE REASSESSMENT NOTE - NS ED NURSE REASSESS COMMENT FT1
Received report from Rebecca Dunphy @0200. Patient on droplet/contact isolation, Patient complaining of pain, morphine given as per MD orders, assisted patient with blanket for comfort, son at bedside, awaiting bed placement. Will continue to monitor

## 2019-02-07 NOTE — ED ADULT NURSE REASSESSMENT NOTE - NS ED NURSE REASSESS COMMENT FT1
Pt resting in bed with son at bedside. Pt awaiting admission orders and bed assignment. Comfort and safety maintained. Isolation precautions maintained (contact and droplet). Will continue to monitor.

## 2019-02-07 NOTE — PATIENT PROFILE ADULT - NSPROIMPLANTSMEDDEV_GEN_A_NUR
If you have an active [facility] account, please look for your well child questionnaire to come to your [facility] account before your next well child visit.    Well-Child Checkup: 11 to 13 Years     Physical activity is key to lifelong good health. Encourage your child to find activities that he or she enjoys.     Between ages 11 and 13, your child will grow and change a lot. Its important to keep having yearly checkups so the healthcare provider can track this progress. As your child enters puberty, he or she may become more embarrassed about having a checkup. Reassure your child that the exam is normal and necessary. Be aware that the healthcare provider may ask to talk with the child without you in the exam room.  School and social issues  Here are some topics you, your child, and the healthcare provider may want to discuss during this visit:  · School performance. How is your child doing in school? Is homework finished on time? Does your child stay organized? These are skills you can help with. Keep in mind that a drop in school performance can be a sign of other problems.  · Friendships. Do you like your childs friends? Do the friendships seem healthy? Make sure to talk to your child about who his or her friends are and how they spend time together. This is the age when peer pressure can start to be a problem.  · Life at home. How is your childs behavior? Does he or she get along with others in the family? Is he or she respectful of you, other adults, and authority? Does your child participate in family events, or does he or she withdraw from other family members?  · Risky behaviors. Its not too early to start talking to your child about drugs, alcohol, smoking, and sex. Make sure your child understands that these are not activities he or she should do, even if friends are. Answer your childs questions, and dont be afraid to ask questions of your own. Make sure your child knows he or she can always come  to you for help. If youre not sure how to approach these topics, talk to the healthcare provider for advice.  Entering puberty  Puberty is the stage when a child begins to develop sexually into an adult. It usually starts between 9 and 14 for girls, and between 12 and 16 for boys. Here is some of what you can expect when puberty begins:  · Acne and body odor. Hormones that increase during puberty can cause acne (pimples) on the face and body. Hormones can also increase sweating and cause a stronger body odor. At this age, your child should begin to shower or bathe daily. Encourage your child to use deodorant and acne products as needed.  · Body changes in girls. Early in puberty, breasts begin to develop. One breast often starts to grow before the other. This is normal. Hair begins to grow in the pubic area, under the arms, and on the legs. Around 2 years after breasts begin to grow, a girl will start having monthly periods (menstruation). To help prepare your daughter for this change, talk to her about periods, what to expect, and how to use feminine products.  · Body changes in boys. At the start of puberty, the testicles drop lower and the scrotum darkens and becomes looser. Hair begins to grow in the pubic area, under the arms, and on the legs, chest, and face. The voice changes, becoming lower and deeper. As the penis grows and matures, erections and wet dreams begin to happen. Reassure your son that this is normal.  · Emotional changes. Along with these physical changes, youll likely notice changes in your childs personality. You may notice your child developing an interest in dating and becoming more than friends with others. Also, many kids become jin and develop an attitude around puberty. This can be frustrating, but it is very normal. Try to be patient and consistent. Encourage conversations, even when your child doesnt seem to want to talk. No matter how your child acts, he or she still needs a  parent.  Nutrition and exercise tips  Today, kids are less active and eat more junk food than ever before. Your child is starting to make choices about what to eat and how active to be. You cant always have the final say, but you can help your child develop healthy habits. Here are some tips:  · Help your child get at least 30 to 60 minutes of activity every day. The time can be broken up throughout the day. If the weathers bad or youre worried about safety, find supervised indoor activities.   · Limit screen time to 1 hour each day. This includes time spent watching TV, playing video games, using the computer, and texting. If your child has a TV, computer, or video game console in the bedroom, consider replacing it with a music player. For many kids, dancing and singing are fun ways to get moving.  · Limit sugary drinks. Soda, juice, and sports drinks lead to unhealthy weight gain and tooth decay. Water and low-fat or nonfat milk are best to drink. In moderation (no more than 8 to 12 ounces daily), 100% fruit juice is OK. Save soda and other sugary drinks for special occasions.  · Have at least one family meal together each day. Busy schedules often limit time for sitting and talking. Sitting and eating together allows for family time. It also lets you see what and how your child eats.  · Pay attention to portions. Serve portions that make sense for your kids. Let them stop eating when theyre full--dont make them clean their plates. Be aware that many kids appetites increase during puberty. If your child is still hungry after a meal, offer seconds of vegetables or fruit.  · Serve and encourage healthy foods. Your child is making more food decisions on his or her own. All foods have a place in a balanced diet. Fruits, vegetables, lean meats, and whole grains should be eaten every day. Save less healthy foods--like french fries, candy, and chips--for a special occasion. When your child does choose to eat junk  "food, consider making the child buy it with his or her own money. Ask your child to tell you when he or she buys junk food or swaps food with friends.  · Bring your child to the dentist at least twice a year for teeth cleaning and a checkup.  Sleeping tips  At this age, your child needs about 10 hours of sleep each night. Here are some tips:  · Set a bedtime and make sure your child follows it each night.  · TV, computer, and video games can agitate a child and make it hard to calm down for the night. Turn them off the at least an hour before bed. Instead, encourage your child to read before bed.  · If your child has a cell phone, make sure its turned off at night.  · Dont let your child go to sleep very late or sleep in on weekends. This can disrupt sleep patterns and make it harder to sleep on school nights.  · Remind your child to brush and floss his or her teeth before bed. Briefly supervise your child's dental self-care once a week to make sure of proper technique.  Safety tips  Recommendations for keeping your child safe include the following:   · When riding a bike, roller-skating, or using a scooter or skateboard, your child should wear a helmet with the strap fastened. When using roller skates, a scooter, or a skateboard, it is also a good idea for your child to wear wrist guards, elbow pads, and knee pads.  · In the car, all children younger than 13 should sit in the back seat. Children shorter than 4'9" (57 inches) should continue to use a booster seat to properly position the seat belt.  · If your child has a cell phone or portable music player, make sure these are used safely and responsibly. Do not allow your child to talk on the phone, text, or listen to music with headphones while he or she is riding a bike or walking outdoors. Remind your child to pay special attention when crossing the street.  · Constant loud music can cause hearing damage, so monitor the volume on your childs music player. " Many players let you set a limit for how loud the volume can be turned up. Check the directions for details.  · At this age, kids may start taking risks that could be dangerous to their health or well-being. Sometimes bad decisions stem from peer pressure. Other times, kids just dont think ahead about what could happen. Teach your child the importance of making good decisions. Talk about how to recognize peer pressure and come up with strategies for coping with it.  · Sudden changes in your childs mood, behavior, friendships, or activities can be warning signs of problems at school or in other aspects of your childs life. If you notice signs like these, talk to your child and to the staff at your childs school. The healthcare provider may also be able to offer advice.  Vaccines  Based on recommendations from the American Association of Pediatrics, at this visit your child may receive the following vaccines:  · Human papillomavirus (HPV) (ages 11 to 12)  · Influenza (flu), annually  · Meningococcal (ages 11 to 12)  · Tetanus, diphtheria, and pertussis (ages 11 to 12)  Stay on top of social media  In this wired age, kids are much more connected with friends--possibly some theyve never met in person. To teach your child how to use social media responsibly:  · Set limits for the use of cell phones, the computer, and the Internet. Remind your child that you can check the web browser history and cell phone logs to know how these devices are being used. Use parental controls and passwords to block access to inappropriate websites. Use privacy settings on websites so only your childs friends can view his or her profile.  · Explain to your child the dangers of giving out personal information online. Teach your child not to share his or her phone number, address, picture, or other personal details with online friends without your permission.  · Make sure your child understands that things he or she says on the  Internet are never private. Posts made on websites like Facebook, [employer], and Twitter can be seen by people they werent intended for. Posts can easily be misunderstood and can even cause trouble for you or your child. Supervise your childs use of social networks, chat rooms, and email.      Next checkup at: _______________________________     PARENT NOTES:  Date Last Reviewed: 12/1/2016  © 5291-8908 "[employer] [address] [place] [address]. All rights reserved. This information is not intended as a substitute for professional medical care. Always follow your healthcare professional's instructions.         None

## 2019-02-07 NOTE — BRIEF OPERATIVE NOTE - OPERATION/FINDINGS
1) Access of L hip synovial capsule under fluoro with 20G needle  2) 8cc yellow viscous joint fluid aspirated

## 2019-02-07 NOTE — ED ADULT NURSE REASSESSMENT NOTE - NS ED NURSE REASSESS COMMENT FT1
Pt sleeping in bed with son at bedside. Pt not given tramadol as she is asleep. Will assess pt for pain when she wakes and give tramadol as appropriate.

## 2019-02-07 NOTE — PROGRESS NOTE ADULT - SUBJECTIVE AND OBJECTIVE BOX
c/c: left hip pain.      HPI:  83 y/o F PMHx significant for hypertension, hyperlipidemia, diabetes mellitus type 2, NH lymphoma, s/p 4 cycles R-CHOP (last PET scan in 2018 was reportedly (-)), GERD, hx of recurrent SBO presents to the ED for further evaluation of intractable left hip pain (10/10) x 1 day. The patient denies any associated recent trauma. The patient's pain progressively worsened this morning () and has reportedly not been able to ambulate since  pain. As per the patient's son, the patient is able to ambulate unassisted at baseline. Of note the patient was recently prescribed a 7 day regimen of Ciprofloxacin 250mg po bid (started on ) for treatment of a "cough." Labs => ESR 58, WBC 2.83, Hgb/Hct 10.6/33.8, , BUN/Cr 43/0.92, RVP (+)hMPV. CT Pelvis => No fracture or dislocation. In the ED Orthopedics was consulted. MRI recommended which showed LEft hip joint effusion.     : pt seen and examined earlier today. Was awaiting IR joint aspiration. No pain at rest. NO sob/chest pain.     Review of system- All 10 systems reviewed and is as per HPI otherwise negative.     VITALS  T(C): 37.7 (19 @ 09:10), Max: 38.3 (19 @ 18:08)  HR: 77 (19 @ 09:10) (77 - 82)  BP: 116/37 (19 @ 09:10) (116/37 - 136/50)  RR: 17 (19 @ 09:10) (16 - 18)  SpO2: 95% (19 @ 09:10) (94% - 100%)      PHYSICAL EXAM:    GENERAL: Elderly female, laying in bed Comfortable, no acute distress  HEAD:  Atraumatic, Normocephalic  EYES: EOMI, PERRLA  HEENT: Moist mucous membranes  NECK: Supple, No JVD  NERVOUS SYSTEM:  Alert & Oriented X3, non focal.  CHEST/LUNG: Clear to auscultation bilaterally  HEART: Regular rate and rhythm; No murmurs, rubs, or gallops  ABDOMEN: Soft, Nontender, Nondistended; Bowel sounds present  GENITOURINARY- Voiding, no palpable bladder  EXTREMITIES:  No clubbing, cyanosis, or edema  MUSCULOSKELETAL- left hp pain with movement  SKIN-no rash        LABS:                        10.6   2.55  )-----------( 118      ( 2019 06:42 )             33.8     02-    140  |  108  |  26<H>  ----------------------------<  93  3.5   |  25  |  0.84    Ca    8.4<L>      2019 06:42  Mg     2.0         TPro  7.1  /  Alb  3.0<L>  /  TBili  0.7  /  DBili  x   /  AST  12<L>  /  ALT  14  /  AlkPhos  136<H>        Urinalysis Basic - ( 2019 20:18 )    Color: Yellow / Appearance: Clear / S.005 / pH: x  Gluc: x / Ketone: Negative  / Bili: Negative / Urobili: Negative mg/dL   Blood: x / Protein: Negative mg/dL / Nitrite: Negative   Leuk Esterase: Negative / RBC: 0-2 /HPF / WBC 0-2   Sq Epi: x / Non Sq Epi: Occasional / Bacteria: Occasional      MEDS  acetaminophen   Tablet .. 650 milliGRAM(s) Oral every 6 hours PRN  docusate sodium 100 milliGRAM(s) Oral three times a day PRN  metoprolol succinate ER 25 milliGRAM(s) Oral daily  morphine  - Injectable 2 milliGRAM(s) IV Push every 6 hours PRN  ondansetron Injectable 4 milliGRAM(s) IV Push every 6 hours PRN  pantoprazole    Tablet 40 milliGRAM(s) Oral before breakfast  senna 2 Tablet(s) Oral at bedtime PRN  traMADol 25 milliGRAM(s) Oral once PRN    ASSESSMENT AND PLAN:    83 y/o F PMHx significant for hypertension, hyperlipidemia, diabetes mellitus type 2, NH lymphoma, s/p 4 cycles R-CHOP (last PET scan in 2018 was reportedly (-)), GERD, hx of recurrent SBO presents to the ED for further evaluation of intractable left hip pain (10/10) x 1 day. The patient denies any associated recent trauma. The patient's pain progressively worsened this morning () and has reportedly not been able to ambulate since  pain. As per the patient's son, the patient is able to ambulate unassisted at baseline. Of note the patient was recently prescribed a 7 day regimen of Ciprofloxacin 250mg po bid (started on ) for treatment of a "cough." Labs => ESR 58, WBC 2.83, Hgb/Hct 10.6/33.8, , BUN/Cr 43/0.92, RVP (+)hMPV. CT Pelvis => No fracture or dislocation. In the ED Orthopedics was consulted. MRI recommended which showed LEft hip joint effusion.     1. Left hip pain with  effusion:  -for IR aspiration  -f/u fluid analysis.  -ortho following  -pain control    2. HMPV:  -isolation precautions  -symptom mx    3. Type 2 diabetes mellitus:  -not on meds  -diet controlled    4. GERD:  -ppi    5. HTN:  -bb    6. DVT px. c/c: left hip pain.      HPI:  85 y/o F PMHx significant for hypertension, hyperlipidemia, diabetes mellitus type 2, NH lymphoma, s/p 4 cycles R-CHOP (last PET scan in 2018 was reportedly (-)), GERD, hx of recurrent SBO presents to the ED for further evaluation of intractable left hip pain (10/10) x 1 day. The patient denies any associated recent trauma. The patient's pain progressively worsened this morning () and has reportedly not been able to ambulate since  pain. As per the patient's son, the patient is able to ambulate unassisted at baseline. Of note the patient was recently prescribed a 7 day regimen of Ciprofloxacin 250mg po bid (started on ) for treatment of a "cough." Labs => ESR 58, WBC 2.83, Hgb/Hct 10.6/33.8, , BUN/Cr 43/0.92, RVP (+)hMPV. CT Pelvis => No fracture or dislocation. In the ED Orthopedics was consulted. MRI recommended which showed LEft hip joint effusion.     : pt seen and examined earlier today. Was awaiting IR joint aspiration. No pain at rest. NO sob/chest pain.     Review of system- All 10 systems reviewed and is as per HPI otherwise negative.     VITALS  T(C): 37.7 (19 @ 09:10), Max: 38.3 (19 @ 18:08)  HR: 77 (19 @ 09:10) (77 - 82)  BP: 116/37 (19 @ 09:10) (116/37 - 136/50)  RR: 17 (19 @ 09:10) (16 - 18)  SpO2: 95% (19 @ 09:10) (94% - 100%)      PHYSICAL EXAM:    GENERAL: Elderly female, laying in bed Comfortable, no acute distress  HEAD:  Atraumatic, Normocephalic  EYES: EOMI, PERRLA  HEENT: Moist mucous membranes  NECK: Supple, No JVD  NERVOUS SYSTEM:  Alert & Oriented X3, non focal.  CHEST/LUNG: Clear to auscultation bilaterally  HEART: Regular rate and rhythm; No murmurs, rubs, or gallops  ABDOMEN: Soft, Nontender, Nondistended; Bowel sounds present  GENITOURINARY- Voiding, no palpable bladder  EXTREMITIES:  No clubbing, cyanosis, or edema  MUSCULOSKELETAL- left hp pain with movement  SKIN-no rash        LABS:                        10.6   2.55  )-----------( 118      ( 2019 06:42 )             33.8     02-    140  |  108  |  26<H>  ----------------------------<  93  3.5   |  25  |  0.84    Ca    8.4<L>      2019 06:42  Mg     2.0         TPro  7.1  /  Alb  3.0<L>  /  TBili  0.7  /  DBili  x   /  AST  12<L>  /  ALT  14  /  AlkPhos  136<H>        Urinalysis Basic - ( 2019 20:18 )    Color: Yellow / Appearance: Clear / S.005 / pH: x  Gluc: x / Ketone: Negative  / Bili: Negative / Urobili: Negative mg/dL   Blood: x / Protein: Negative mg/dL / Nitrite: Negative   Leuk Esterase: Negative / RBC: 0-2 /HPF / WBC 0-2   Sq Epi: x / Non Sq Epi: Occasional / Bacteria: Occasional      MEDS  acetaminophen   Tablet .. 650 milliGRAM(s) Oral every 6 hours PRN  docusate sodium 100 milliGRAM(s) Oral three times a day PRN  metoprolol succinate ER 25 milliGRAM(s) Oral daily  morphine  - Injectable 2 milliGRAM(s) IV Push every 6 hours PRN  ondansetron Injectable 4 milliGRAM(s) IV Push every 6 hours PRN  pantoprazole    Tablet 40 milliGRAM(s) Oral before breakfast  senna 2 Tablet(s) Oral at bedtime PRN  traMADol 25 milliGRAM(s) Oral once PRN    ASSESSMENT AND PLAN:    84F, pmh as above a/w:    1. Left hip pain with  effusion:  -for IR aspiration  -f/u fluid analysis.  -ortho following  -pain control    2. HMPV URI:  -isolation precautions  -symptom mx    3. Type 2 diabetes mellitus:  -not on meds  -diet controlled    4. GERD:  -ppi    5. HTN:  -bb    6. DVT px.

## 2019-02-08 PROBLEM — E11.9 TYPE 2 DIABETES MELLITUS WITHOUT COMPLICATIONS: Chronic | Status: ACTIVE | Noted: 2017-06-23

## 2019-02-08 LAB
ANION GAP SERPL CALC-SCNC: 8 MMOL/L — SIGNIFICANT CHANGE UP (ref 5–17)
BASOPHILS # BLD AUTO: 0 K/UL — SIGNIFICANT CHANGE UP (ref 0–0.2)
BASOPHILS NFR BLD AUTO: 0 % — SIGNIFICANT CHANGE UP (ref 0–2)
BUN SERPL-MCNC: 23 MG/DL — SIGNIFICANT CHANGE UP (ref 7–23)
CALCIUM SERPL-MCNC: 8.7 MG/DL — SIGNIFICANT CHANGE UP (ref 8.5–10.1)
CHLORIDE SERPL-SCNC: 105 MMOL/L — SIGNIFICANT CHANGE UP (ref 96–108)
CO2 SERPL-SCNC: 24 MMOL/L — SIGNIFICANT CHANGE UP (ref 22–31)
CREAT SERPL-MCNC: 0.86 MG/DL — SIGNIFICANT CHANGE UP (ref 0.5–1.3)
CULTURE RESULTS: SIGNIFICANT CHANGE UP
EOSINOPHIL # BLD AUTO: 0 K/UL — SIGNIFICANT CHANGE UP (ref 0–0.5)
EOSINOPHIL NFR BLD AUTO: 0 % — SIGNIFICANT CHANGE UP (ref 0–6)
FLUID SEGMENTED GRANULOCYTES: 66 % — SIGNIFICANT CHANGE UP
GLUCOSE SERPL-MCNC: 87 MG/DL — SIGNIFICANT CHANGE UP (ref 70–99)
GRAM STN FLD: SIGNIFICANT CHANGE UP
HCT VFR BLD CALC: 34.5 % — SIGNIFICANT CHANGE UP (ref 34.5–45)
HGB BLD-MCNC: 10.7 G/DL — LOW (ref 11.5–15.5)
IMM GRANULOCYTES NFR BLD AUTO: 0.8 % — SIGNIFICANT CHANGE UP (ref 0–1.5)
LYMPHOCYTES # BLD AUTO: 0.58 K/UL — LOW (ref 1–3.3)
LYMPHOCYTES # BLD AUTO: 15.6 % — SIGNIFICANT CHANGE UP (ref 13–44)
LYMPHOCYTES # FLD: 3 % — SIGNIFICANT CHANGE UP
MAGNESIUM SERPL-MCNC: 2 MG/DL — SIGNIFICANT CHANGE UP (ref 1.6–2.6)
MCHC RBC-ENTMCNC: 27.1 PG — SIGNIFICANT CHANGE UP (ref 27–34)
MCHC RBC-ENTMCNC: 31 GM/DL — LOW (ref 32–36)
MCV RBC AUTO: 87.3 FL — SIGNIFICANT CHANGE UP (ref 80–100)
MONOCYTES # BLD AUTO: 0.6 K/UL — SIGNIFICANT CHANGE UP (ref 0–0.9)
MONOCYTES NFR BLD AUTO: 16.2 % — HIGH (ref 2–14)
MONOS+MACROS # FLD: 31 % — SIGNIFICANT CHANGE UP
NEUTROPHILS # BLD AUTO: 2.5 K/UL — SIGNIFICANT CHANGE UP (ref 1.8–7.4)
NEUTROPHILS NFR BLD AUTO: 67.4 % — SIGNIFICANT CHANGE UP (ref 43–77)
NRBC # BLD: 0 /100 WBCS — SIGNIFICANT CHANGE UP (ref 0–0)
PHOSPHATE SERPL-MCNC: 2.9 MG/DL — SIGNIFICANT CHANGE UP (ref 2.5–4.5)
PLATELET # BLD AUTO: 117 K/UL — LOW (ref 150–400)
POTASSIUM SERPL-MCNC: 3.7 MMOL/L — SIGNIFICANT CHANGE UP (ref 3.5–5.3)
POTASSIUM SERPL-SCNC: 3.7 MMOL/L — SIGNIFICANT CHANGE UP (ref 3.5–5.3)
RBC # BLD: 3.95 M/UL — SIGNIFICANT CHANGE UP (ref 3.8–5.2)
RBC # FLD: 15.3 % — HIGH (ref 10.3–14.5)
SODIUM SERPL-SCNC: 137 MMOL/L — SIGNIFICANT CHANGE UP (ref 135–145)
SPECIMEN SOURCE: SIGNIFICANT CHANGE UP
SPECIMEN SOURCE: SIGNIFICANT CHANGE UP
WBC # BLD: 3.71 K/UL — LOW (ref 3.8–10.5)
WBC # FLD AUTO: 3.71 K/UL — LOW (ref 3.8–10.5)

## 2019-02-08 RX ORDER — CELECOXIB 200 MG/1
200 CAPSULE ORAL
Qty: 0 | Refills: 0 | Status: DISCONTINUED | OUTPATIENT
Start: 2019-02-08 | End: 2019-02-11

## 2019-02-08 RX ORDER — HEPARIN SODIUM 5000 [USP'U]/ML
5000 INJECTION INTRAVENOUS; SUBCUTANEOUS EVERY 12 HOURS
Qty: 0 | Refills: 0 | Status: DISCONTINUED | OUTPATIENT
Start: 2019-02-08 | End: 2019-02-11

## 2019-02-08 RX ADMIN — Medication 650 MILLIGRAM(S): at 12:06

## 2019-02-08 RX ADMIN — Medication 25 MILLIGRAM(S): at 04:12

## 2019-02-08 RX ADMIN — HEPARIN SODIUM 5000 UNIT(S): 5000 INJECTION INTRAVENOUS; SUBCUTANEOUS at 18:05

## 2019-02-08 RX ADMIN — MORPHINE SULFATE 2 MILLIGRAM(S): 50 CAPSULE, EXTENDED RELEASE ORAL at 14:41

## 2019-02-08 RX ADMIN — Medication 650 MILLIGRAM(S): at 17:09

## 2019-02-08 RX ADMIN — CELECOXIB 200 MILLIGRAM(S): 200 CAPSULE ORAL at 18:05

## 2019-02-08 RX ADMIN — MORPHINE SULFATE 2 MILLIGRAM(S): 50 CAPSULE, EXTENDED RELEASE ORAL at 04:12

## 2019-02-08 RX ADMIN — PANTOPRAZOLE SODIUM 40 MILLIGRAM(S): 20 TABLET, DELAYED RELEASE ORAL at 04:12

## 2019-02-08 NOTE — PHYSICAL THERAPY INITIAL EVALUATION ADULT - PASSIVE RANGE OF MOTION EXAMINATION, REHAB EVAL
L LE AAROM WNLs, c/o mild pain with movement. R hip AAROM WNLs, but R knee painful to move > 20* of flexion.

## 2019-02-08 NOTE — DIETITIAN INITIAL EVALUATION ADULT. - OTHER INFO
Pt seen for low BMI. Pt with h/x of malnutrition. Per last rd note: PMH of HTN, HLD, DM2, lymphoma x 1 yr s/p chemo in remission x 2 months with multiple hospitlizations for recurrent SBO (5) pending eventual surgery.  Upon visit, pt appears thin and underwt (BMI 18.7) with NFPE significant for moderate temporal and clavicle muscle wasting.  moderate orbital fat wasting.  Pt following liquid diet at home, which meets <75% of estimated nutr needs x 7 months.  Pt with 46# (29%) wt loss x 7 months, clinically significant. no edema.  no BM, now passing gas.  haley score of 18, no PU.  Based on above, pt meets criteria for severe malnutrition in chronic illness. Discussed pt's status with pt's son. Pt continues to puree food and is trying to add additional protein. They are also drinking ensure daily. Pt weight appears stable. no edema noted, haley 16, no skin break down. RECOMMENDATIONS: 1) as tolerated, feasible advance diet to full liquids with ensure enlive TID and ensure pudding BID 2) monitor PO intake/tolerance 3) obtain new wt daily 4) monitor lytes/mins; replete/correct prn

## 2019-02-08 NOTE — DIETITIAN INITIAL EVALUATION ADULT. - PERTINENT MEDS FT
MEDICATIONS  (STANDING):  celecoxib 200 milliGRAM(s) Oral two times a day  metoprolol succinate ER 25 milliGRAM(s) Oral daily  pantoprazole    Tablet 40 milliGRAM(s) Oral before breakfast    MEDICATIONS  (PRN):  acetaminophen   Tablet .. 650 milliGRAM(s) Oral every 6 hours PRN Temp greater or equal to 38C (100.4F), Mild Pain (1 - 3)  docusate sodium 100 milliGRAM(s) Oral three times a day PRN Constipation  morphine  - Injectable 2 milliGRAM(s) IV Push every 6 hours PRN Severe Pain (7 - 10)  ondansetron Injectable 4 milliGRAM(s) IV Push every 6 hours PRN Nausea  senna 2 Tablet(s) Oral at bedtime PRN Constipation  traMADol 25 milliGRAM(s) Oral once PRN Moderate Pain (4 - 6)

## 2019-02-08 NOTE — DIETITIAN INITIAL EVALUATION ADULT. - PROBLEM SELECTOR PLAN 6
~patient's advanced directives were discussed with the patient and patient's son at the bedside. The patient states that her wishes are to be DNR/DNI. The patient is agreeable to IV hydration, IV antibiotics, and future hospitalizations. The patient is not agreeable to a feeding tube. MOLST form completed and placed in patient's chart.  Total Time; 16 minutes

## 2019-02-08 NOTE — PROGRESS NOTE ADULT - SUBJECTIVE AND OBJECTIVE BOX
Pt S/E at bedside, MRI negative for occult fracture but demonstrated effusion, had IR aspiration of Left hip yesterday, no acute events overnight, pain improving    Vital Signs Last 24 Hrs  T(C): 37.4 (08 Feb 2019 04:20), Max: 37.4 (08 Feb 2019 04:20)  T(F): 99.4 (08 Feb 2019 04:20), Max: 99.4 (08 Feb 2019 04:20)  HR: 83 (08 Feb 2019 04:20) (69 - 83)  BP: 129/70 (08 Feb 2019 04:20) (105/58 - 129/70)  BP(mean): --  RR: 17 (08 Feb 2019 04:20) (17 - 17)  SpO2: 94% (08 Feb 2019 04:20) (94% - 98%)    Gen: NAD    Left Lower Extremity:  Dressing clean dry intact  +EHL/FHL/TA/GS  SILT L3-S1  +DP/PT Pulses  Compartments soft  No calf TTP B/L

## 2019-02-08 NOTE — PHYSICAL THERAPY INITIAL EVALUATION ADULT - ACTIVE RANGE OF MOTION EXAMINATION, REHAB EVAL
Patient c/o pain in R elbow with movement/bilateral upper extremity Active ROM was WFL (within functional limits)

## 2019-02-08 NOTE — PHYSICAL THERAPY INITIAL EVALUATION ADULT - MODALITIES TREATMENT COMMENTS
Patient left in bed as found. Ice pack provided for pain relief.  Son asking for more pain meds for his Mom.  RN, MD informed of session.

## 2019-02-08 NOTE — PHYSICAL THERAPY INITIAL EVALUATION ADULT - MANUAL MUSCLE TESTING RESULTS, REHAB EVAL
grossly assessed due to/language.  B UEs WFLs except R elbow extension breaks to pain. L LE at least 3/5 (able to straight leg raise.  R LE 2/5

## 2019-02-08 NOTE — PHYSICAL THERAPY INITIAL EVALUATION ADULT - PERTINENT HX OF CURRENT PROBLEM, REHAB EVAL
85 yo F admitted for further evaluation of intractable left hip pain (10/10) x 1 day. The patient denies any associated recent trauma.  RVP (+)hMPV.  Patient on droplet precautions. CT Pelvis => No fracture or dislocation.  MRI 83 yo F admitted for further evaluation of intractable left hip pain (10/10) x 1 day. The patient denies any associated recent trauma.  RVP (+)hMPV.  Patient on droplet precautions. CT Pelvis => No fracture or dislocation.  MRI: edema, synovitis, (-) fx. L hip. (+) sub acute L5 compression fx. s/p IR aspiration of L hip, results pending.

## 2019-02-08 NOTE — PHYSICAL THERAPY INITIAL EVALUATION ADULT - GENERAL OBSERVATIONS, REHAB EVAL
Patient received in bed, on Facetime with son who is a PA to interpret.  Patient speaks Togolese.  Patient c/o apin at 8/10 at rest.  Just had morphine prior to session per RM. Patient in NAD

## 2019-02-08 NOTE — CHART NOTE - NSCHARTNOTEFT_GEN_A_CORE
Upon Nutritional Assessment by the Registered Dietitian your patient was determined to meet criteria / has evidence of the following diagnosis/diagnoses:          [ ]  Mild Protein Calorie Malnutrition        [ ]  Moderate Protein Calorie Malnutrition        [x] Severe Protein Calorie Malnutrition        [ ] Unspecified Protein Calorie Malnutrition        [ ] Underweight / BMI <19        [ ] Morbid Obesity / BMI > 40      Findings as based on:  •  Comprehensive nutrition assessment and consultation  •  Calorie counts (nutrient intake analysis)  •  Food acceptance and intake status from observations by staff  •  Follow up  •  Patient education  •  Intervention secondary to interdisciplinary rounds  •   concerns      Treatment:    The following diet has been recommended:  C/w current nutrition plan of care (see RD notes)    PROVIDER Section:     By signing this assessment you are acknowledging and agree with the diagnosis/diagnoses assigned by the Registered Dietitian    Comments:

## 2019-02-08 NOTE — PROGRESS NOTE ADULT - SUBJECTIVE AND OBJECTIVE BOX
c/c: left hip pain.      HPI:  83 y/o F PMHx significant for hypertension, hyperlipidemia, diabetes mellitus type 2, NH lymphoma, s/p 4 cycles R-CHOP (last PET scan in 2018 was reportedly (-)), GERD, hx of recurrent SBO presents to the ED for further evaluation of intractable left hip pain (10/10) x 1 day. The patient denies any associated recent trauma. The patient's pain progressively worsened this morning () and has reportedly not been able to ambulate since  pain. As per the patient's son, the patient is able to ambulate unassisted at baseline. Of note the patient was recently prescribed a 7 day regimen of Ciprofloxacin 250mg po bid (started on ) for treatment of a "cough." Labs => ESR 58, WBC 2.83, Hgb/Hct 10.6/33.8, , BUN/Cr 43/0.92, RVP (+)hMPV. CT Pelvis => No fracture or dislocation. In the ED Orthopedics was consulted. MRI recommended which showed LEft hip joint effusion.     : pt seen and examined earlier today. Was awaiting IR joint aspiration. No pain at rest. NO sob/chest pain.    pt c/o several joints swelling and pain, RT knee, RT elbow, LT hip    Review of system- All 10 systems reviewed and is as per HPI otherwise negative.     Vital Signs Last 24 Hrs  T(C): 37.4 (2019 14:44), Max: 38.8 (2019 12:04)  T(F): 99.3 (2019 14:44), Max: 101.8 (2019 12:04)  HR: 83 (2019 12:04) (69 - 83)  BP: 126/52 (2019 12:04) (105/58 - 129/70)  BP(mean): --  RR: 18 (2019 12:04) (17 - 18)  SpO2: 94% (2019 12:04) (94% - 98%)    PHYSICAL EXAM:  GENERAL: Elderly female, laying in bed Comfortable, no acute distress  HEAD:  Atraumatic, Normocephalic  EYES: EOMI, PERRLA  HEENT: Moist mucous membranes  NECK: Supple, No JVD  NERVOUS SYSTEM:  Alert & Oriented X3, non focal.  CHEST/LUNG: Clear to auscultation bilaterally  HEART: Regular rate and rhythm; No murmurs, rubs, or gallops  ABDOMEN: Soft, Nontender, Nondistended; Bowel sounds present  GENITOURINARY- Voiding, no palpable bladder  EXTREMITIES:  No clubbing, cyanosis, or edema  MUSCULOSKELETAL- RT knee and RT elbow slightly swollen and warm, no erythema, ROM painful but preserved  SKIN-no rash    LABS:                        10.7   3.71  )-----------( 117      ( 2019 08:01 )             34.5     2019 08:01    137    |  105    |  23     ----------------------------<  87     3.7     |  24     |  0.86     Ca    8.7        2019 08:01  Phos  2.9       2019 08:01  Mg     2.0       2019 08:01    TPro  7.1    /  Alb  3.0    /  TBili  0.7    /  DBili  x      /  AST  12     /  ALT  14     /  AlkPhos  136    2019 06:42    LIVER FUNCTIONS - ( 2019 06:42 )  Alb: 3.0 g/dL / Pro: 7.1 gm/dL / ALK PHOS: 136 U/L / ALT: 14 U/L / AST: 12 U/L / GGT: x           Urinalysis Basic - ( 2019 20:18 )  Color: Yellow / Appearance: Clear / S.005 / pH: x  Gluc: x / Ketone: Negative  / Bili: Negative / Urobili: Negative mg/dL   Blood: x / Protein: Negative mg/dL / Nitrite: Negative   Leuk Esterase: Negative / RBC: 0-2 /HPF / WBC 0-2   Sq Epi: x / Non Sq Epi: Occasional / Bacteria: Occasional    MEDICATIONS  (STANDING):  celecoxib 200 milliGRAM(s) Oral two times a day  heparin  Injectable 5000 Unit(s) SubCutaneous every 12 hours  metoprolol succinate ER 25 milliGRAM(s) Oral daily  pantoprazole    Tablet 40 milliGRAM(s) Oral before breakfast    MEDICATIONS  (PRN):  acetaminophen   Tablet .. 650 milliGRAM(s) Oral every 6 hours PRN Temp greater or equal to 38C (100.4F), Mild Pain (1 - 3)  docusate sodium 100 milliGRAM(s) Oral three times a day PRN Constipation  morphine  - Injectable 2 milliGRAM(s) IV Push every 6 hours PRN Severe Pain (7 - 10)  ondansetron Injectable 4 milliGRAM(s) IV Push every 6 hours PRN Nausea  senna 2 Tablet(s) Oral at bedtime PRN Constipation  traMADol 25 milliGRAM(s) Oral once PRN Moderate Pain (4 - 6)    ASSESSMENT AND PLAN:  #Multiple joints pain and swelling  S/p LT hip aspiration by IR  Fluid does not look infected  As pt has multiple joints involved- r/o rheumatologic disease  ESR, CRP elevated  Will send RF, VINICIO  Rheum eval DR Mims    #HMPV URI:  -isolation precautions  -symptom mx    #Type 2 diabetes mellitus:  -not on meds  -diet controlled    #GERD:  -ppi    #HTN:  -bb    #Dispo- further work up, continue PT.

## 2019-02-09 LAB
ANION GAP SERPL CALC-SCNC: 10 MMOL/L — SIGNIFICANT CHANGE UP (ref 5–17)
BUN SERPL-MCNC: 33 MG/DL — HIGH (ref 7–23)
CALCIUM SERPL-MCNC: 8.3 MG/DL — LOW (ref 8.5–10.1)
CHLORIDE SERPL-SCNC: 104 MMOL/L — SIGNIFICANT CHANGE UP (ref 96–108)
CO2 SERPL-SCNC: 24 MMOL/L — SIGNIFICANT CHANGE UP (ref 22–31)
CREAT SERPL-MCNC: 1.13 MG/DL — SIGNIFICANT CHANGE UP (ref 0.5–1.3)
GLUCOSE SERPL-MCNC: 204 MG/DL — HIGH (ref 70–99)
HCT VFR BLD CALC: 31.1 % — LOW (ref 34.5–45)
HGB BLD-MCNC: 9.6 G/DL — LOW (ref 11.5–15.5)
MCHC RBC-ENTMCNC: 26.8 PG — LOW (ref 27–34)
MCHC RBC-ENTMCNC: 30.9 GM/DL — LOW (ref 32–36)
MCV RBC AUTO: 86.9 FL — SIGNIFICANT CHANGE UP (ref 80–100)
NRBC # BLD: 0 /100 WBCS — SIGNIFICANT CHANGE UP (ref 0–0)
PLATELET # BLD AUTO: 119 K/UL — LOW (ref 150–400)
POTASSIUM SERPL-MCNC: 3.1 MMOL/L — LOW (ref 3.5–5.3)
POTASSIUM SERPL-SCNC: 3.1 MMOL/L — LOW (ref 3.5–5.3)
RBC # BLD: 3.58 M/UL — LOW (ref 3.8–5.2)
RBC # FLD: 15.3 % — HIGH (ref 10.3–14.5)
RHEUMATOID FACT SERPL-ACNC: 14 IU/ML — HIGH (ref 0–13)
SODIUM SERPL-SCNC: 138 MMOL/L — SIGNIFICANT CHANGE UP (ref 135–145)
URATE SERPL-MCNC: 6.2 MG/DL — SIGNIFICANT CHANGE UP (ref 2.5–7)
WBC # BLD: 3.16 K/UL — LOW (ref 3.8–10.5)
WBC # FLD AUTO: 3.16 K/UL — LOW (ref 3.8–10.5)

## 2019-02-09 PROCEDURE — 99223 1ST HOSP IP/OBS HIGH 75: CPT

## 2019-02-09 RX ADMIN — HEPARIN SODIUM 5000 UNIT(S): 5000 INJECTION INTRAVENOUS; SUBCUTANEOUS at 17:22

## 2019-02-09 RX ADMIN — CELECOXIB 200 MILLIGRAM(S): 200 CAPSULE ORAL at 06:34

## 2019-02-09 RX ADMIN — PANTOPRAZOLE SODIUM 40 MILLIGRAM(S): 20 TABLET, DELAYED RELEASE ORAL at 06:34

## 2019-02-09 RX ADMIN — CELECOXIB 200 MILLIGRAM(S): 200 CAPSULE ORAL at 17:22

## 2019-02-09 RX ADMIN — Medication 650 MILLIGRAM(S): at 14:31

## 2019-02-09 RX ADMIN — Medication 20 MILLIGRAM(S): at 17:22

## 2019-02-09 RX ADMIN — Medication 650 MILLIGRAM(S): at 06:35

## 2019-02-09 RX ADMIN — Medication 25 MILLIGRAM(S): at 06:34

## 2019-02-09 RX ADMIN — HEPARIN SODIUM 5000 UNIT(S): 5000 INJECTION INTRAVENOUS; SUBCUTANEOUS at 06:34

## 2019-02-09 NOTE — CONSULT NOTE ADULT - ASSESSMENT
85 y/o Female with h/o hypertension, hyperlipidemia, diabetes mellitus type 2, NH lymphoma, s/p 4 cycles R-CHOP (last PET scan in 11/2018 was reportedly negative), GERD, hx of recurrent SBO was admitted on 2/6 for intractable left hip pain (10/10) x 1 day. The patient denied any recent trauma. Of note the patient was recently prescribed a 7 day regimen of Ciprofloxacin 250mg po bid (started on 2/1) for treatment of a "cough." PTA. In ER she was noted with hMPv in respiratory tract. After hospitalization, she had a left hip aspiration. On 2/8 she was noted   febrile to 101.9F and left knee pain an swelling.    1. Febrile syndrome. Pneumonitis with hMPv. Arthralgia. Left hip inflammatory arthritis. Allergy to PCN. 85 y/o Female with h/o hypertension, hyperlipidemia, diabetes mellitus type 2, NH lymphoma, s/p 4 cycles R-CHOP (last PET scan in 11/2018 was reportedly negative), GERD, hx of recurrent SBO was admitted on 2/6 for intractable left hip pain (10/10) x 1 day. The patient denied any recent trauma. Of note the patient was recently prescribed a 7 day regimen of Ciprofloxacin 250mg po bid (started on 2/1) for treatment of a "cough." PTA. In ER she was noted with hMPv in respiratory tract. After hospitalization, she had a left hip aspiration. On 2/8 she was noted   febrile to 101.9F and left knee pain an swelling.    1. Febrile syndrome. Pneumonitis with hMPv. Right knee arthralgia. 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 Immunocompromised host. Allergy to PCN.  -left hip aspirate reviewed; c/w inflammatory arthritis  -obtain BC x 2  -obtain CMV, EBV, parvovirus B19 serology  -antiinflammatory therapy  -would observe off abx therapy for now  -droplet isolation  -old chart reviewed to assess prior cultures  -monitor temps  -f/u CBC  -supportive care  2. Other issues:   -care per medicine

## 2019-02-09 NOTE — CONSULT NOTE ADULT - SUBJECTIVE AND OBJECTIVE BOX
Patient is a 84y old  Female who presents with a chief complaint of Left Hip Pain. Fever (2019 14:00)    HPI:  85 y/o Female with h/o hypertension, hyperlipidemia, diabetes mellitus type 2, NH lymphoma, s/p 4 cycles R-CHOP (last PET scan in 2018 was reportedly negative), GERD, hx of recurrent SBO was admitted on  for intractable left hip pain (10/10) x 1 day. The patient denied any recent trauma. Of note the patient was recently prescribed a 7 day regimen of Ciprofloxacin 250mg po bid (started on ) for treatment of a "cough." PTA. In ER she was noted with hMPv in respiratory tract. After hospitalization, she had a left hip aspiration. On  she was noted   febrile to 101.9F and left knee pain an swelling.    PMH: as above  PSH: as above  Meds: per reconciliation sheet, noted below  MEDICATIONS  (STANDING):  celecoxib 200 milliGRAM(s) Oral two times a day  heparin  Injectable 5000 Unit(s) SubCutaneous every 12 hours  metoprolol succinate ER 25 milliGRAM(s) Oral daily  pantoprazole    Tablet 40 milliGRAM(s) Oral before breakfast    MEDICATIONS  (PRN):  acetaminophen   Tablet .. 650 milliGRAM(s) Oral every 6 hours PRN Temp greater or equal to 38C (100.4F), Mild Pain (1 - 3)  docusate sodium 100 milliGRAM(s) Oral three times a day PRN Constipation  morphine  - Injectable 2 milliGRAM(s) IV Push every 6 hours PRN Severe Pain (7 - 10)  ondansetron Injectable 4 milliGRAM(s) IV Push every 6 hours PRN Nausea  senna 2 Tablet(s) Oral at bedtime PRN Constipation  traMADol 25 milliGRAM(s) Oral once PRN Moderate Pain (4 - 6)    Allergies    penicillin (Rash)    Intolerances      Social: no smoking, no alcohol, no illegal drugs; no recent travel, no exposure to TB  FAMILY HISTORY:  Family history of early CAD (Father)  Family history of diabetes mellitus (Mother)    no history of premature cardiovascular disease in first degree relatives    ROS: the patient denies fever, no chills, no HA, no seizures, no dizziness, no sore throat, no nasal congestion, no blurry vision, no CP, no palpitations, no SOB, no cough, no abdominal pain, no diarrhea, no N/V, no dysuria, no leg pain, no claudication, no rash, has left hip and knee joint aches, no rectal pain or bleeding, no night sweats  All other systems reviewed and are negative    Vital Signs Last 24 Hrs  T(C): 36.8 (2019 11:50), Max: 38.8 (2019 17:30)  T(F): 98.3 (2019 11:50), Max: 101.9 (2019 17:30)  HR: 66 (2019 11:50) (66 - 88)  BP: 128/64 (2019 05:21) (128/64 - 154/66)  BP(mean): --  RR: 16 (2019 11:50) (16 - 18)  SpO2: 97% (2019 11:50) (95% - 99%)  Daily     Daily Weight in k.9 (2019 15:05)    PE:    Constitutional: frail looking  HEENT: NC/AT, EOMI, PERRLA, conjunctivae clear; ears and nose atraumatic; pharynx benign  Neck: supple; thyroid not palpable  Back: no tenderness  Respiratory: respiratory effort normal; clear to auscultation  Cardiovascular: S1S2 regular, no murmurs  Abdomen: soft, not tender, not distended, positive BS; no liver or spleen organomegaly  Genitourinary: no suprapubic tenderness  Lymphatic: no LN palpable  Musculoskeletal: no muscle tenderness, no joint swelling or tenderness  Extremities: no pedal edema  Neurological/ Psychiatric: AxOx3, judgement and insight normal; moving all extremities  Skin: no rashes; no palpable lesions    Labs: all available labs reviewed                        9.6    3.16  )-----------( 119      ( 2019 10:06 )             31.1     02-09    138  |  104  |  33<H>  ----------------------------<  204<H>  3.1<L>   |  24  |  1.13    Ca    8.3<L>      2019 10:06  Phos  2.9     02-08  Mg     2.0     02-08      Culture - Body Fluid with Gram Stain (collected 2019 14:00)  Source: .Body Fluid left hip aspiration  Gram Stain (2019 08:10):    polymorphonuclear leukocytes seen    No organisms seen    by cytocentrifuge  Preliminary Report (2019 11:08):    No growth    Culture - Urine (collected 2019 20:18)  Source: .Urine None  Final Report (2019 07:18):    <10,000 CFU/ml Normal Urogenital ene present    Culture - Blood (collected 2019 19:44)  Source: .Blood None  Preliminary Report (2019 03:01):    No growth to date.    Culture - Blood (collected 2019 18:23)  Source: .Blood Blood  Preliminary Report (2019 01:03):    No growth to date.      Radiology: all available radiological tests reviewed    < from: MR Pelvis Bony Only No Cont (19 @ 11:16) >  Moderate to large left hip joint effusion with perisynovial soft tissue   edema as well as edema within the adductor musculature. This is   consistent with nonspecific synovitis. If there is concern for infectious   synovitis, joint aspiration is recommended.  There is no fracture or significant arthrosis.  Intermediate signal marrow likely related to posttreatment change.  Subacute appearing mild compression fracture of the superior endplate of   L5 with associated bone marrow edema.    < end of copied text >      Advanced directives addressed: full resuscitation Patient is a 84y old  Female who presents with a chief complaint of Left Hip Pain. Fever (2019 14:00)    HPI:  85 y/o Female with h/o hypertension, hyperlipidemia, diabetes mellitus type 2, NH lymphoma, s/p 4 cycles R-CHOP (last PET scan in 2018 was reportedly negative), GERD, hx of recurrent SBO was admitted on  for intractable left hip pain (10/10) x 1 day. The patient denied any recent trauma. Of note the patient was recently prescribed a 7 day regimen of Ciprofloxacin 250mg po bid (started on ) for treatment of a "cough." PTA. In ER she was noted with hMPv in respiratory tract. After hospitalization, she had a left hip aspiration. On  she was noted   febrile to 101.9F and right knee pain and swelling.    PMH: as above  PSH: as above  Meds: per reconciliation sheet, noted below  MEDICATIONS  (STANDING):  celecoxib 200 milliGRAM(s) Oral two times a day  heparin  Injectable 5000 Unit(s) SubCutaneous every 12 hours  metoprolol succinate ER 25 milliGRAM(s) Oral daily  pantoprazole    Tablet 40 milliGRAM(s) Oral before breakfast    MEDICATIONS  (PRN):  acetaminophen   Tablet .. 650 milliGRAM(s) Oral every 6 hours PRN Temp greater or equal to 38C (100.4F), Mild Pain (1 - 3)  docusate sodium 100 milliGRAM(s) Oral three times a day PRN Constipation  morphine  - Injectable 2 milliGRAM(s) IV Push every 6 hours PRN Severe Pain (7 - 10)  ondansetron Injectable 4 milliGRAM(s) IV Push every 6 hours PRN Nausea  senna 2 Tablet(s) Oral at bedtime PRN Constipation  traMADol 25 milliGRAM(s) Oral once PRN Moderate Pain (4 - 6)    Allergies    penicillin (Rash)    Intolerances      Social: no smoking, no alcohol, no illegal drugs; no recent travel, no exposure to TB  FAMILY HISTORY:  Family history of early CAD (Father)  Family history of diabetes mellitus (Mother)    no history of premature cardiovascular disease in first degree relatives    ROS: the patient denies HA, no seizures, no dizziness, no sore throat, no nasal congestion, no blurry vision, no CP, no palpitations, no SOB, has dry cough, no abdominal pain, no diarrhea, no N/V, no dysuria, no leg pain, no claudication, no rash, has left hip and right knee joint aches, no rectal pain or bleeding, no night sweats  All other systems reviewed and are negative    Vital Signs Last 24 Hrs  T(C): 36.8 (2019 11:50), Max: 38.8 (2019 17:30)  T(F): 98.3 (2019 11:50), Max: 101.9 (2019 17:30)  HR: 66 (2019 11:50) (66 - 88)  BP: 128/64 (2019 05:21) (128/64 - 154/66)  BP(mean): --  RR: 16 (2019 11:50) (16 - 18)  SpO2: 97% (2019 11:50) (95% - 99%)  Daily     Daily Weight in k.9 (2019 15:05)    PE:    Constitutional: frail looking  HEENT: NC/AT, EOMI, PERRLA, conjunctivae clear; ears and nose atraumatic; pharynx benign  Neck: supple; thyroid not palpable  Back: no tenderness  Respiratory: respiratory effort normal; clear to auscultation  Cardiovascular: S1S2 regular, no murmurs  Abdomen: soft, not tender, not distended, positive BS; no liver or spleen organomegaly  Genitourinary: no suprapubic tenderness  Lymphatic: no LN palpable  Musculoskeletal: no muscle tenderness, right knee joint swelling or tenderness; no erythema; has reduced ROM  Extremities: no pedal edema  Neurological/ Psychiatric: AxOx3, judgement and insight normal; moving all extremities  Skin: no rashes; no palpable lesions    Labs: all available labs reviewed                        9.6    3.16  )-----------( 119      ( 2019 10:06 )             31.1     02-09    138  |  104  |  33<H>  ----------------------------<  204<H>  3.1<L>   |  24  |  1.13    Ca    8.3<L>      2019 10:06  Phos  2.9     02-08  Mg     2.0     02-08      Culture - Body Fluid with Gram Stain (collected 2019 14:00)  Source: .Body Fluid left hip aspiration  Gram Stain (2019 08:10):    polymorphonuclear leukocytes seen    No organisms seen    by cytocentrifuge  Preliminary Report (2019 11:08):    No growth    Culture - Urine (collected 2019 20:18)  Source: .Urine None  Final Report (2019 07:18):    <10,000 CFU/ml Normal Urogenital ene present    Culture - Blood (collected 2019 19:44)  Source: .Blood None  Preliminary Report (2019 03:01):    No growth to date.    Culture - Blood (collected 2019 18:23)  Source: .Blood Blood  Preliminary Report (2019 01:03):    No growth to date.      Radiology: all available radiological tests reviewed    < from: MR Pelvis Bony Only No Cont (19 @ 11:16) >  Moderate to large left hip joint effusion with perisynovial soft tissue   edema as well as edema within the adductor musculature. This is   consistent with nonspecific synovitis. If there is concern for infectious   synovitis, joint aspiration is recommended.  There is no fracture or significant arthrosis.  Intermediate signal marrow likely related to posttreatment change.  Subacute appearing mild compression fracture of the superior endplate of   L5 with associated bone marrow edema.    < end of copied text >      Advanced directives addressed: full resuscitation    Advanced care planning and DNR :   discussion and decision planning about the patient's care in case she cannot speak for herself. Her personal wishes were discussed with her health care proxy, son.   discussed with the patient and the patient health care proxy   30 min. face to face contact with the patient.

## 2019-02-09 NOTE — CONSULT NOTE ADULT - ASSESSMENT
This is a 84 year old with history of DLBCL s/p 6 cycles of R-CHOP now admitted for intractable hip pain found to have hMPV    - No indication for filgrastim   - discussed with patients' son Prince   - patient due for procrit as an outpatient   - will monitor serial CBCs  - will resume procrit as outpatient

## 2019-02-09 NOTE — PROGRESS NOTE ADULT - SUBJECTIVE AND OBJECTIVE BOX
c/c: left hip pain, fever    HPI:  83 y/o F PMHx significant for hypertension, hyperlipidemia, diabetes mellitus type 2, NH lymphoma, s/p 4 cycles R-CHOP (last PET scan in 11/2018 was reportedly (-)), GERD, hx of recurrent SBO presents to the ED for further evaluation of intractable left hip pain (10/10) x 1 day. The patient denies any associated recent trauma. The patient's pain progressively worsened this morning (2/6) and has reportedly not been able to ambulate since 2/2 pain. As per the patient's son, the patient is able to ambulate unassisted at baseline. Of note the patient was recently prescribed a 7 day regimen of Ciprofloxacin 250mg po bid (started on 2/1) for treatment of a "cough." Labs => ESR 58, WBC 2.83, Hgb/Hct 10.6/33.8, , BUN/Cr 43/0.92, RVP (+)hMPV. CT Pelvis => No fracture or dislocation. In the ED Orthopedics was consulted. MRI recommended which showed LEft hip joint effusion.     2/7: pt seen and examined earlier today. Was awaiting IR joint aspiration. No pain at rest. NO sob/chest pain.   2/8 pt c/o several joints swelling and pain, RT knee, RT elbow, LT hip  2/9/19 still with intermittent fevers tmax last night 101.9. +polyarthralgia    Review of system- All 10 systems reviewed and is as per HPI otherwise negative.     Vital Signs Last 24 Hrs  T(C): 36.8 (09 Feb 2019 11:50), Max: 38.8 (08 Feb 2019 17:30)  T(F): 98.3 (09 Feb 2019 11:50), Max: 101.9 (08 Feb 2019 17:30)  HR: 66 (09 Feb 2019 11:50) (66 - 88)  BP: 128/64 (09 Feb 2019 05:21) (128/64 - 154/66)  BP(mean): --  RR: 16 (09 Feb 2019 11:50) (16 - 18)  SpO2: 97% (09 Feb 2019 11:50) (95% - 99%)    PHYSICAL EXAM:  GENERAL: Elderly female, laying in bed Comfortable, no acute distress  HEAD:  Atraumatic, Normocephalic  EYES: EOMI, PERRLA  HEENT: Moist mucous membranes  NECK: Supple, No JVD  NERVOUS SYSTEM:  Alert & Oriented X3, non focal.  CHEST/LUNG: Clear to auscultation bilaterally  HEART: Regular rate and rhythm; No murmurs, rubs, or gallops  ABDOMEN: Soft, Nontender, Nondistended; Bowel sounds present  GENITOURINARY- Voiding, no palpable bladder  EXTREMITIES:  No clubbing, cyanosis, or edema  MUSCULOSKELETAL- RT knee and RT elbow slightly swollen and warm, no erythema, ROM painful but preserved  SKIN-no rash    LABS:                                9.6    3.16  )-----------( 119      ( 09 Feb 2019 10:06 )             31.1     09 Feb 2019 10:06    138    |  104    |  33     ----------------------------<  204    3.1     |  24     |  1.13     Ca    8.3        09 Feb 2019 10:06  Phos  2.9       08 Feb 2019 08:01  Mg     2.0       08 Feb 2019 08:01    MEDICATIONS  (STANDING):  celecoxib 200 milliGRAM(s) Oral two times a day  heparin  Injectable 5000 Unit(s) SubCutaneous every 12 hours  metoprolol succinate ER 25 milliGRAM(s) Oral daily  pantoprazole    Tablet 40 milliGRAM(s) Oral before breakfast    MEDICATIONS  (PRN):  acetaminophen   Tablet .. 650 milliGRAM(s) Oral every 6 hours PRN Temp greater or equal to 38C (100.4F), Mild Pain (1 - 3)  docusate sodium 100 milliGRAM(s) Oral three times a day PRN Constipation  morphine  - Injectable 2 milliGRAM(s) IV Push every 6 hours PRN Severe Pain (7 - 10)  ondansetron Injectable 4 milliGRAM(s) IV Push every 6 hours PRN Nausea  senna 2 Tablet(s) Oral at bedtime PRN Constipation  traMADol 25 milliGRAM(s) Oral once PRN Moderate Pain (4 - 6)    ASSESSMENT AND PLAN:  #Polyarthralgia  S/p LT hip aspiration by IR  Fluid does not look infected  As pt has multiple joints involved- r/o rheumatologic disease  ESR, CRP elevated  Will send RF, VINICIO, uric acid  Rheum eval DR Mims    #Fevers  Initially given Cipro 250mg but then stopped  Tmax last night 101.9  No diarrhea, +mild cough  CXR neg on admission, Blood c/s and Urine c/s neg  +hMPV, but already 3 days with fevers  Will repeat blood c/s if spikes fever again  ID rubina Francisco  Hold antibiotics till seen by ID    #HMPV URI:  -isolation precautions  -symptom mx    #Type 2 diabetes mellitus:  -not on meds  -diet controlled    #GERD:  -ppi    #HTN:  -bb    #Dispo- rheum and ID rubina. Monitor fever trend. D/w pt and son at bedside.

## 2019-02-09 NOTE — CONSULT NOTE ADULT - SUBJECTIVE AND OBJECTIVE BOX
[facility] [place] [facility] consult   HPI:  83 y/o F PMHx significant for hypertension, hyperlipidemia, diabetes mellitus type 2, NH lymphoma, s/p 4 cycles R-CHOP (last PET scan in 11/2018 was reportedly (-)), GERD, hx of recurrent SBO presents to the ED for further evaluation of intractable left hip pain (10/10) x 1 day. The patient denies any associated recent trauma. The patient's pain progressively worsened this morning (2/6) and has reportedly not been able to ambulate since 2/2 pain. As per the patient's son, the patient is able to ambulate unassisted at baseline. Of note the patient was recently prescribed a 7 day regimen of Ciprofloxacin 250mg po bid (started on 2/1) for treatment of a "cough." Labs => ESR 58, WBC 2.83, Hgb/Hct 10.6/33.8, , BUN/Cr 43/0.92, RVP (+)hMPV. CT Pelvis => No fracture or dislocation. In the ED Orthopedics was consulted. (06 Feb 2019 23:49)    Is an 84-year-old female who is followed by my colleague Dr. Polanco for diffuse large B-cell lymphoma after presenting with bulky lymphadenopathy and treated with 6 cycles of R CHOP.  The patient is last treatment was approximately 3 months ago where she received treatment of Rituxan.  She received a total of 6 cycles of R CHOP doses were modified due to cardiac dysfunction.  The patient's posttreatment course was complicated by recurrent small bowel obstructions.  The patient was admitted for generalized weakness as well as intractable left hip pain whereby she is undergone and aspiration cultures are presently negative and was found to have human Fowler pneumo virus.  The patient had been followed closely as an outpatient for concerns regarding persistent leukopenia as well as anemia.  The patient had been maintained on Neupogen as well as Procrit injections.  Her last Procrit was on February 1, 2019 .  On admission the patient's ANC was noted at 1.6 .    Allergies    penicillin (Rash)    Intolerances        MEDICATIONS  (STANDING):  celecoxib 200 milliGRAM(s) Oral two times a day  heparin  Injectable 5000 Unit(s) SubCutaneous every 12 hours  methylPREDNISolone sodium succinate Injectable 20 milliGRAM(s) IV Push daily  metoprolol succinate ER 25 milliGRAM(s) Oral daily  pantoprazole    Tablet 40 milliGRAM(s) Oral before breakfast    MEDICATIONS  (PRN):  acetaminophen   Tablet .. 650 milliGRAM(s) Oral every 6 hours PRN Temp greater or equal to 38C (100.4F), Mild Pain (1 - 3)  docusate sodium 100 milliGRAM(s) Oral three times a day PRN Constipation  morphine  - Injectable 2 milliGRAM(s) IV Push every 6 hours PRN Severe Pain (7 - 10)  ondansetron Injectable 4 milliGRAM(s) IV Push every 6 hours PRN Nausea  senna 2 Tablet(s) Oral at bedtime PRN Constipation  traMADol 25 milliGRAM(s) Oral once PRN Moderate Pain (4 - 6)      PAST MEDICAL & SURGICAL HISTORY:  CKD (chronic kidney disease) stage 3, GFR 30-59 ml/min  Anemia, unspecified type  GERD (gastroesophageal reflux disease)  High cholesterol  Diabetes: Type 2  HTN (hypertension)  H/O: hysterectomy  S/P cholecystectomy  S/P appendectomy      FAMILY HISTORY:  Family history of early CAD (Father)  Family history of diabetes mellitus (Mother)      SOCIAL HISTORY: No EtOH, no tobacco        Todays's Evaluation:    GENERAL: NAD, well-developed  HEAD:  Atraumatic, Normocephalic  EYES: EOMI, PERRLA, conjunctiva and sclera clear  NECK: Supple, No JVD  CHEST/LUNG: Clear to auscultation bilaterally; No wheeze  HEART: Regular rate and rhythm; No murmurs, rubs, or gallops  ABDOMEN: Soft, Nontender, Nondistended; Bowel sounds present  EXTREMITIES:  2+ Peripheral Pulses, No clubbing, cyanosis, or edema Focal right knee swelling   NEUROLOGY: non-focal  SKIN: No rashes or lesions    Laboratories:                           9.6    3.16  )-----------( 119      ( 09 Feb 2019 10:06 )             31.1       02-09    138  |  104  |  33<H>  ----------------------------<  204<H>  3.1<L>   |  24  |  1.13    Ca    8.3<L>      09 Feb 2019 10:06  Phos  2.9     02-08  Mg     2.0     02-08        Uric Acid, Serum: 6.2 mg/dL (02-09 @ 10:06)      Summary:    Plan:

## 2019-02-09 NOTE — CONSULT NOTE ADULT - SUBJECTIVE AND OBJECTIVE BOX
PCP:    REQUESTING PHYSICIAN:  Dr. Espana    REASON FOR CONSULT:  Multiple joint swelling    CHIEF COMPLAINT:  Joint pain    HPI:  83 y/o F PMHx significant for hypertension, hyperlipidemia, diabetes mellitus type 2, NH lymphoma, s/p 4 cycles R-CHOP (last PET scan in 11/2018 was reportedly (-)), GERD, hx of recurrent SBO presents to the ED for further evaluation of intractable left hip pain (10/10) x 1 day. The patient denies any associated recent trauma. The patient's pain progressively worsened this morning (2/6) and has reportedly not been able to ambulate since 2/2 pain. As per the patient's son, the patient is able to ambulate unassisted at baseline. Of note the patient was recently prescribed a 7 day regimen of Ciprofloxacin 250mg po bid (started on 2/1) for treatment of a "cough." Labs => ESR 58, WBC 2.83, Hgb/Hct 10.6/33.8, , BUN/Cr 43/0.92, RVP (+)hMPV. CT Pelvis => No fracture or dislocation. In the ED Orthopedics was consulted. (06 Feb 2019 23:49)  She has had left hip aspiration, which revealed 7161 cell count, cultures negative to date 48 hours.  No crystals identified.    Patient denies hx of prior joint pains or chronic AM stiffness, though speaking with son later he states she has had some arthralgias in the past.   She states the left hip was the initial joint that started bothering her.  Yesterday, her right wrist, hand, and right knee also started bothering her.  She has not had any recent dental procedures or surgical procedures.  She did test + for metapneumovirus, and her fever curve is improving.  She denies sore throat, rash, migratory joint pains.  She us unable to ambulate due to the pain.  She has no known hx of rheumatoid arthritis, but states several family members have the common arthritis.    She was started on celebrex yesterday, and has had some mild improvement in left hip today.      REVIEW OF SYSTEMS:    CONSTITUTIONAL: No weakness, fevers or chills  EYES/ENT: No visual changes;  No vertigo or throat pain   NECK: No pain or stiffness  RESPIRATORY: No cough, wheezing, hemoptysis; No shortness of breath  CARDIOVASCULAR: No chest pain or palpitations  GASTROINTESTINAL: No abdominal or epigastric pain. No nausea, vomiting, or hematemesis; No diarrhea or constipation. No melena or hematochezia.  GENITOURINARY: No dysuria, frequency or hematuria  NEUROLOGICAL: No numbness or weakness  SKIN: No itching, burning, rashes, or lesions   All other review of systems is negative unless indicated above    Vital Signs Last 24 Hrs  T(C): 36.8 (09 Feb 2019 11:50), Max: 38.8 (08 Feb 2019 17:30)  T(F): 98.3 (09 Feb 2019 11:50), Max: 101.9 (08 Feb 2019 17:30)  HR: 66 (09 Feb 2019 11:50) (66 - 88)  BP: 128/64 (09 Feb 2019 05:21) (128/64 - 154/66)  BP(mean): --  RR: 16 (09 Feb 2019 11:50) (16 - 18)  SpO2: 97% (09 Feb 2019 11:50) (95% - 99%)    I&O's Summary    08 Feb 2019 07:01  -  09 Feb 2019 07:00  --------------------------------------------------------  IN: 360 mL / OUT: 0 mL / NET: 360 mL        CAPILLARY BLOOD GLUCOSE          PHYSICAL EXAM:    HEENT- no oral lesions noted, no lymphadenoapthy noted  Heart- S1 S2 reg  Lungs- CTA b/l  Abd- Soft NT  Ext- no edema  Skin- no rashes  Musculoskelatal- Good ROM b/L shoulders, elbows.  +TTP right wrist, several PIPs right hand.  Left wrist with swelling, but no significant pain.  No TTP small joints left hand.  Left knee with POM.  Right knee with large effusion.  b/L ankles and small joints of feet unremarkable.    Neurological- intact strength upper and lower extremities    MEDICATIONS:  MEDICATIONS  (STANDING):  celecoxib 200 milliGRAM(s) Oral two times a day  heparin  Injectable 5000 Unit(s) SubCutaneous every 12 hours  metoprolol succinate ER 25 milliGRAM(s) Oral daily  pantoprazole    Tablet 40 milliGRAM(s) Oral before breakfast      LABS: All Labs Reviewed:                        9.6    3.16  )-----------( 119      ( 09 Feb 2019 10:06 )             31.1     02-09    138  |  104  |  33<H>  ----------------------------<  204<H>  3.1<L>   |  24  |  1.13    Ca    8.3<L>      09 Feb 2019 10:06  Phos  2.9     02-08  Mg     2.0     02-08            Blood Culture: Organism --  Gram Stain Blood -- Gram Stain   polymorphonuclear leukocytes seen  No organisms seen  by cytocentrifuge  Specimen Source .Body Fluid left hip aspiration  Culture-Blood --    Organism --  Gram Stain Blood -- Gram Stain --  Specimen Source .Urine None  Culture-Blood --    Organism --  Gram Stain Blood -- Gram Stain --  Specimen Source .Blood None  Culture-Blood --    Organism --  Gram Stain Blood -- Gram Stain --  Specimen Source .Blood Blood  Culture-Blood --        RADIOLOGY/EKG:    A/P

## 2019-02-09 NOTE — CONSULT NOTE ADULT - ASSESSMENT
85 y/o F PMHx significant for hypertension, hyperlipidemia, diabetes mellitus type 2, NH lymphoma, s/p 4 cycles R-CHOP (last PET scan in 11/2018 was reportedly (-)), GERD, hx of recurrent SBO presents to the ED for further evaluation of intractable left hip pain (10/10) x 1 day. The patient denies any associated recent trauma.  On exam, she has polyarthritis including left hip, right knee, right wrist/hand.  She has had left hip aspiration, which revealed 7161 cell count, cultures negative to date 48 hours.  No crystals seen.  Synovial cultures at 48 hours are negative.   She has had very mild improvement on celebrex thus far.  Polyarthritis is likely viral reactive, though crystalline also still on differential.      Plan;  -Prior outpatient labs ordered by Dr. Avalos reviewed in [employer].  Patient has had negative RF, negative VINICIO and VINICIO subserologies.  Will had CCP for completeness.  Uric acid previously as high as 9, latest reading was 7.  -CMV, Parvovirus, EBV serologies ordered per ID  -Would continue celebrex for now.  Will add steroid, conservative dosing given immunosuppressed state. Patient is being observed off antibiotics. To start 20mg solumedrol IV daily.  Dose to be adjusted as needed.  -will follow

## 2019-02-10 LAB
ANION GAP SERPL CALC-SCNC: 8 MMOL/L — SIGNIFICANT CHANGE UP (ref 5–17)
BUN SERPL-MCNC: 35 MG/DL — HIGH (ref 7–23)
CALCIUM SERPL-MCNC: 8.8 MG/DL — SIGNIFICANT CHANGE UP (ref 8.5–10.1)
CHLORIDE SERPL-SCNC: 105 MMOL/L — SIGNIFICANT CHANGE UP (ref 96–108)
CO2 SERPL-SCNC: 26 MMOL/L — SIGNIFICANT CHANGE UP (ref 22–31)
CREAT SERPL-MCNC: 0.97 MG/DL — SIGNIFICANT CHANGE UP (ref 0.5–1.3)
GLUCOSE SERPL-MCNC: 172 MG/DL — HIGH (ref 70–99)
HCT VFR BLD CALC: 34.9 % — SIGNIFICANT CHANGE UP (ref 34.5–45)
HGB BLD-MCNC: 10.8 G/DL — LOW (ref 11.5–15.5)
MCHC RBC-ENTMCNC: 26.7 PG — LOW (ref 27–34)
MCHC RBC-ENTMCNC: 30.9 GM/DL — LOW (ref 32–36)
MCV RBC AUTO: 86.2 FL — SIGNIFICANT CHANGE UP (ref 80–100)
NRBC # BLD: 0 /100 WBCS — SIGNIFICANT CHANGE UP (ref 0–0)
PLATELET # BLD AUTO: 134 K/UL — LOW (ref 150–400)
POTASSIUM SERPL-MCNC: 3.9 MMOL/L — SIGNIFICANT CHANGE UP (ref 3.5–5.3)
POTASSIUM SERPL-SCNC: 3.9 MMOL/L — SIGNIFICANT CHANGE UP (ref 3.5–5.3)
RBC # BLD: 4.05 M/UL — SIGNIFICANT CHANGE UP (ref 3.8–5.2)
RBC # FLD: 15.1 % — HIGH (ref 10.3–14.5)
SODIUM SERPL-SCNC: 139 MMOL/L — SIGNIFICANT CHANGE UP (ref 135–145)
WBC # BLD: 1.89 K/UL — LOW (ref 3.8–10.5)
WBC # FLD AUTO: 1.89 K/UL — LOW (ref 3.8–10.5)

## 2019-02-10 PROCEDURE — 99232 SBSQ HOSP IP/OBS MODERATE 35: CPT

## 2019-02-10 RX ADMIN — CELECOXIB 200 MILLIGRAM(S): 200 CAPSULE ORAL at 17:10

## 2019-02-10 RX ADMIN — HEPARIN SODIUM 5000 UNIT(S): 5000 INJECTION INTRAVENOUS; SUBCUTANEOUS at 17:10

## 2019-02-10 RX ADMIN — Medication 25 MILLIGRAM(S): at 06:04

## 2019-02-10 RX ADMIN — PANTOPRAZOLE SODIUM 40 MILLIGRAM(S): 20 TABLET, DELAYED RELEASE ORAL at 06:05

## 2019-02-10 RX ADMIN — Medication 20 MILLIGRAM(S): at 17:10

## 2019-02-10 RX ADMIN — HEPARIN SODIUM 5000 UNIT(S): 5000 INJECTION INTRAVENOUS; SUBCUTANEOUS at 06:05

## 2019-02-10 RX ADMIN — CELECOXIB 200 MILLIGRAM(S): 200 CAPSULE ORAL at 06:04

## 2019-02-10 NOTE — PROGRESS NOTE ADULT - SUBJECTIVE AND OBJECTIVE BOX
c/c: left hip pain, fever    HPI:  83 y/o F PMHx significant for hypertension, hyperlipidemia, diabetes mellitus type 2, NH lymphoma, s/p 4 cycles R-CHOP (last PET scan in 11/2018 was reportedly (-)), GERD, hx of recurrent SBO presents to the ED for further evaluation of intractable left hip pain (10/10) x 1 day. The patient denies any associated recent trauma. The patient's pain progressively worsened this morning (2/6) and has reportedly not been able to ambulate since 2/2 pain. As per the patient's son, the patient is able to ambulate unassisted at baseline. Of note the patient was recently prescribed a 7 day regimen of Ciprofloxacin 250mg po bid (started on 2/1) for treatment of a "cough." Labs => ESR 58, WBC 2.83, Hgb/Hct 10.6/33.8, , BUN/Cr 43/0.92, RVP (+)hMPV. CT Pelvis => No fracture or dislocation. In the ED Orthopedics was consulted. MRI recommended which showed LEft hip joint effusion.     2/7: pt seen and examined earlier today. Was awaiting IR joint aspiration. No pain at rest. NO sob/chest pain.   2/8 pt c/o several joints swelling and pain, RT knee, RT elbow, LT hip  2/9/19 still with intermittent fevers tmax last night 101.9. +polyarthralgia  2/10/19 feels better after steroids    Review of system- All 10 systems reviewed and is as per HPI otherwise negative.     Vital Signs Last 24 Hrs  T(C): 36.5 (10 Feb 2019 11:24), Max: 37.3 (09 Feb 2019 16:33)  T(F): 97.7 (10 Feb 2019 11:24), Max: 99.2 (09 Feb 2019 16:33)  HR: 62 (10 Feb 2019 11:24) (62 - 78)  BP: 102/70 (10 Feb 2019 11:24) (102/70 - 128/52)  BP(mean): --  RR: 16 (10 Feb 2019 11:24) (16 - 17)  SpO2: 98% (10 Feb 2019 11:24) (97% - 98%)    PHYSICAL EXAM:  GENERAL: Elderly female, laying in bed Comfortable, no acute distress  HEAD:  Atraumatic, Normocephalic  EYES: EOMI, PERRLA  HEENT: Moist mucous membranes  NECK: Supple, No JVD  NERVOUS SYSTEM:  Alert & Oriented X3, non focal.  CHEST/LUNG: Clear to auscultation bilaterally  HEART: Regular rate and rhythm; No murmurs, rubs, or gallops  ABDOMEN: Soft, Nontender, Nondistended; Bowel sounds present  GENITOURINARY- Voiding, no palpable bladder  EXTREMITIES:  No clubbing, cyanosis, or edema  MUSCULOSKELETAL- RT knee and RT elbow swelling improved  SKIN-no rash    LABS:                                         10.8   1.89  )-----------( 134      ( 10 Feb 2019 07:57 )             34.9     10 Feb 2019 07:57    139    |  105    |  35     ----------------------------<  172    3.9     |  26     |  0.97     Ca    8.8        10 Feb 2019 07:57    MEDICATIONS  (STANDING):  celecoxib 200 milliGRAM(s) Oral two times a day  heparin  Injectable 5000 Unit(s) SubCutaneous every 12 hours  metoprolol succinate ER 25 milliGRAM(s) Oral daily  pantoprazole    Tablet 40 milliGRAM(s) Oral before breakfast    MEDICATIONS  (PRN):  acetaminophen   Tablet .. 650 milliGRAM(s) Oral every 6 hours PRN Temp greater or equal to 38C (100.4F), Mild Pain (1 - 3)  docusate sodium 100 milliGRAM(s) Oral three times a day PRN Constipation  morphine  - Injectable 2 milliGRAM(s) IV Push every 6 hours PRN Severe Pain (7 - 10)  ondansetron Injectable 4 milliGRAM(s) IV Push every 6 hours PRN Nausea  senna 2 Tablet(s) Oral at bedtime PRN Constipation  traMADol 25 milliGRAM(s) Oral once PRN Moderate Pain (4 - 6)    ASSESSMENT AND PLAN:  #Polyarthralgia ?etiology  S/p LT hip aspiration by IR  Fluid does not look infected  Rheum eval appreciated  Started on Iv steroids and pt reports feeling better  Cont PT/OOB    #Fevers  ID eval DR Francisco appreciated  Monitor Off abx    #Lymphoma  #Neutropenia  Hem-onc eval appreciated  Observe off Neupogen for now    #HMPV URI:  -isolation precautions  -symptom mx    #Type 2 diabetes mellitus:  -not on meds  -diet controlled    #GERD:  -ppi    #HTN:  -bb    #Dispo- cont IV steroids. PT/OOB. D/w pt, DR Francisco, DR Enriquez

## 2019-02-10 NOTE — PROGRESS NOTE ADULT - SUBJECTIVE AND OBJECTIVE BOX
CHIEF COMPLAINT:    SUBJECTIVE:   Patient reports feeling much better.  Still with some pain in left hip, right wrist, right knee, but overall better than yesterday.  Was able to walk with PT down the chahal.  Denies any new symptoms since yesterday.      REVIEW OF SYSTEMS:    Negative except as above.      Vital Signs Last 24 Hrs  T(C): 36.5 (10 Feb 2019 11:24), Max: 37.3 (09 Feb 2019 16:33)  T(F): 97.7 (10 Feb 2019 11:24), Max: 99.2 (09 Feb 2019 16:33)  HR: 62 (10 Feb 2019 11:24) (62 - 78)  BP: 102/70 (10 Feb 2019 11:24) (102/70 - 128/52)  BP(mean): --  RR: 16 (10 Feb 2019 11:24) (16 - 17)  SpO2: 98% (10 Feb 2019 11:24) (97% - 98%)    I&O's Summary      CAPILLARY BLOOD GLUCOSE          PHYSICAL EXAM:    HEENT- no oral lesions noted, no lymphadenoapthy noted  Heart- S1 S2 reg  Lungs- CTA b/l  Abd- Soft NT  Ext- no edema  Skin- no rashes  Musculoskelatal- Good ROM b/L shoulders, elbows.  +TTP right wrist, no TTP small joints of right hand.  Left hand no TTP of small joints/wrist.   Right knee with decrease in the effusion.  b/L ankles and small joints of feet unremarkable.    Neurological- intact strength upper and lower extremities      MEDICATIONS:  MEDICATIONS  (STANDING):  celecoxib 200 milliGRAM(s) Oral two times a day  heparin  Injectable 5000 Unit(s) SubCutaneous every 12 hours  methylPREDNISolone sodium succinate Injectable 20 milliGRAM(s) IV Push daily  metoprolol succinate ER 25 milliGRAM(s) Oral daily  pantoprazole    Tablet 40 milliGRAM(s) Oral before breakfast      LABS: All Labs Reviewed:                        10.8   1.89  )-----------( 134      ( 10 Feb 2019 07:57 )             34.9     02-10    139  |  105  |  35<H>  ----------------------------<  172<H>  3.9   |  26  |  0.97    Ca    8.8      10 Feb 2019 07:57            Blood Culture: 02-07 @ 14:00  Organism --  Gram Stain Blood -- Gram Stain   polymorphonuclear leukocytes seen  No organisms seen  by cytocentrifuge  Specimen Source .Body Fluid left hip aspiration  Culture-Blood --    02-06 @ 20:18  Organism --  Gram Stain Blood -- Gram Stain --  Specimen Source .Urine None  Culture-Blood --    02-06 @ 19:44  Organism --  Gram Stain Blood -- Gram Stain --  Specimen Source .Blood None  Culture-Blood --    02-06 @ 18:23  Organism --  Gram Stain Blood -- Gram Stain --  Specimen Source .Blood Blood  Culture-Blood --        RADIOLOGY/EKG:    A/P:

## 2019-02-10 NOTE — PROGRESS NOTE ADULT - SUBJECTIVE AND OBJECTIVE BOX
Date of service: 02-10-19 @ 13:26    Lying in bed in NAD  Left hip and right knee pain are improving  Right knee edema is down  Has dry cough  Fever is down    ROS: denies dizziness, no HA, no abdominal pain, no diarrhea or constipation; no dysuria, no legs pain, no rashes    MEDICATIONS  (STANDING):  celecoxib 200 milliGRAM(s) Oral two times a day  heparin  Injectable 5000 Unit(s) SubCutaneous every 12 hours  methylPREDNISolone sodium succinate Injectable 20 milliGRAM(s) IV Push daily  metoprolol succinate ER 25 milliGRAM(s) Oral daily  pantoprazole    Tablet 40 milliGRAM(s) Oral before breakfast      Vital Signs Last 24 Hrs  T(C): 36.5 (10 Feb 2019 11:24), Max: 37.3 (09 Feb 2019 16:33)  T(F): 97.7 (10 Feb 2019 11:24), Max: 99.2 (09 Feb 2019 16:33)  HR: 62 (10 Feb 2019 11:24) (62 - 78)  BP: 102/70 (10 Feb 2019 11:24) (102/70 - 128/52)  BP(mean): --  RR: 16 (10 Feb 2019 11:24) (16 - 17)  SpO2: 98% (10 Feb 2019 11:24) (97% - 98%)    Physical Exam:      Constitutional: frail looking  HEENT: NC/AT, EOMI, PERRLA, conjunctivae clear  Neck: supple; thyroid not palpable  Back: no tenderness  Respiratory: respiratory effort normal; clear to auscultation  Cardiovascular: S1S2 regular, no murmurs  Abdomen: soft, not tender, not distended, positive BS  Genitourinary: no suprapubic tenderness  Lymphatic: no LN palpable  Musculoskeletal: no muscle tenderness,   Right knee joint swelling or tenderness is much improved; no erythema; has reduced ROM  Extremities: no pedal edema  Neurological/ Psychiatric: AxOx3, judgement and insight normal; moving all extremities  Skin: no rashes; no palpable lesions    Labs: reviewed                        10.8   1.89  )-----------( 134      ( 10 Feb 2019 07:57 )             34.9     02-10    139  |  105  |  35<H>  ----------------------------<  172<H>  3.9   |  26  |  0.97    Ca    8.8      10 Feb 2019 07:57                        9.6    3.16  )-----------( 119      ( 09 Feb 2019 10:06 )             31.1     02-09    138  |  104  |  33<H>  ----------------------------<  204<H>  3.1<L>   |  24  |  1.13    Ca    8.3<L>      09 Feb 2019 10:06  Phos  2.9     02-08  Mg     2.0     02-08      Culture - Body Fluid with Gram Stain (collected 07 Feb 2019 14:00)  Source: .Body Fluid left hip aspiration  Gram Stain (08 Feb 2019 08:10):    polymorphonuclear leukocytes seen    No organisms seen    by cytocentrifuge  Preliminary Report (09 Feb 2019 11:08):    No growth    Culture - Urine (collected 06 Feb 2019 20:18)  Source: .Urine None  Final Report (08 Feb 2019 07:18):    <10,000 CFU/ml Normal Urogenital ene present    Culture - Blood (collected 06 Feb 2019 19:44)  Source: .Blood None  Preliminary Report (08 Feb 2019 03:01):    No growth to date.    Culture - Blood (collected 06 Feb 2019 18:23)  Source: .Blood Blood  Preliminary Report (08 Feb 2019 01:03):    No growth to date.      Radiology: all available radiological tests reviewed    < from: MR Pelvis Bony Only No Cont (02.07.19 @ 11:16) >  Moderate to large left hip joint effusion with perisynovial soft tissue   edema as well as edema within the adductor musculature. This is   consistent with nonspecific synovitis. If there is concern for infectious   synovitis, joint aspiration is recommended.  There is no fracture or significant arthrosis.  Intermediate signal marrow likely related to posttreatment change.  Subacute appearing mild compression fracture of the superior endplate of   L5 with associated bone marrow edema.    < end of copied text >      Advanced directives addressed: full resuscitation    Advanced care planning and DNR :   discussion and decision planning about the patient's care in case she cannot speak for herself. Her personal wishes were discussed with her health care proxy, son.   discussed with the patient and the patient health care proxy   30 min. face to face contact with the patient. Date of service: 02-10-19 @ 13:26    Lying in bed in NAD  Left hip and right knee pain are improving  Right knee edema is down  Has dry cough  Fever is down    ROS: denies dizziness, no HA, no abdominal pain, no diarrhea or constipation; no dysuria, no legs pain, no rashes    MEDICATIONS  (STANDING):  celecoxib 200 milliGRAM(s) Oral two times a day  heparin  Injectable 5000 Unit(s) SubCutaneous every 12 hours  methylPREDNISolone sodium succinate Injectable 20 milliGRAM(s) IV Push daily  metoprolol succinate ER 25 milliGRAM(s) Oral daily  pantoprazole    Tablet 40 milliGRAM(s) Oral before breakfast      Vital Signs Last 24 Hrs  T(C): 36.5 (10 Feb 2019 11:24), Max: 37.3 (09 Feb 2019 16:33)  T(F): 97.7 (10 Feb 2019 11:24), Max: 99.2 (09 Feb 2019 16:33)  HR: 62 (10 Feb 2019 11:24) (62 - 78)  BP: 102/70 (10 Feb 2019 11:24) (102/70 - 128/52)  BP(mean): --  RR: 16 (10 Feb 2019 11:24) (16 - 17)  SpO2: 98% (10 Feb 2019 11:24) (97% - 98%)    Physical Exam:      Constitutional: frail looking  HEENT: NC/AT, EOMI, PERRLA, conjunctivae clear  Neck: supple; thyroid not palpable  Back: no tenderness  Respiratory: respiratory effort normal; clear to auscultation  Cardiovascular: S1S2 regular, no murmurs  Abdomen: soft, not tender, not distended, positive BS  Genitourinary: no suprapubic tenderness  Lymphatic: no LN palpable  Musculoskeletal: no muscle tenderness,   Right knee joint swelling or tenderness is much improved; no erythema; has reduced ROM  Extremities: no pedal edema  Neurological/ Psychiatric: AxOx3, judgement and insight normal; moving all extremities  Skin: no rashes; no palpable lesions    Labs: reviewed                        10.8   1.89  )-----------( 134      ( 10 Feb 2019 07:57 )             34.9     02-10    139  |  105  |  35<H>  ----------------------------<  172<H>  3.9   |  26  |  0.97    Ca    8.8      10 Feb 2019 07:57                        9.6    3.16  )-----------( 119      ( 09 Feb 2019 10:06 )             31.1     02-09    138  |  104  |  33<H>  ----------------------------<  204<H>  3.1<L>   |  24  |  1.13    Ca    8.3<L>      09 Feb 2019 10:06  Phos  2.9     02-08  Mg     2.0     02-08      Culture - Body Fluid with Gram Stain (collected 07 Feb 2019 14:00)  Source: .Body Fluid left hip aspiration  Gram Stain (08 Feb 2019 08:10):    polymorphonuclear leukocytes seen    No organisms seen    by cytocentrifuge  Preliminary Report (09 Feb 2019 11:08):    No growth    Culture - Urine (collected 06 Feb 2019 20:18)  Source: .Urine None  Final Report (08 Feb 2019 07:18):    <10,000 CFU/ml Normal Urogenital ene present    Culture - Blood (collected 06 Feb 2019 19:44)  Source: .Blood None  Preliminary Report (08 Feb 2019 03:01):    No growth to date.    Culture - Blood (collected 06 Feb 2019 18:23)  Source: .Blood Blood  Preliminary Report (08 Feb 2019 01:03):    No growth to date.      Radiology: all available radiological tests reviewed    < from: MR Pelvis Bony Only No Cont (02.07.19 @ 11:16) >  Moderate to large left hip joint effusion with perisynovial soft tissue   edema as well as edema within the adductor musculature. This is   consistent with nonspecific synovitis. If there is concern for infectious   synovitis, joint aspiration is recommended.  There is no fracture or significant arthrosis.  Intermediate signal marrow likely related to posttreatment change.  Subacute appearing mild compression fracture of the superior endplate of   L5 with associated bone marrow edema.    < end of copied text >      Advanced directives addressed: full resuscitation

## 2019-02-11 ENCOUNTER — TRANSCRIPTION ENCOUNTER (OUTPATIENT)
Age: 84
End: 2019-02-11

## 2019-02-11 VITALS
OXYGEN SATURATION: 100 % | RESPIRATION RATE: 18 BRPM | SYSTOLIC BLOOD PRESSURE: 145 MMHG | TEMPERATURE: 98 F | HEART RATE: 68 BPM | DIASTOLIC BLOOD PRESSURE: 62 MMHG

## 2019-02-11 LAB
BASOPHILS # BLD AUTO: 0 K/UL — SIGNIFICANT CHANGE UP (ref 0–0.2)
BASOPHILS NFR BLD AUTO: 0 % — SIGNIFICANT CHANGE UP (ref 0–2)
CMV IGM FLD-ACNC: <8 AU/ML — SIGNIFICANT CHANGE UP
CMV IGM SERPL QL: NEGATIVE — SIGNIFICANT CHANGE UP
EBV EA AB SER IA-ACNC: 12.5 U/ML — HIGH
EBV EA AB TITR SER IF: POSITIVE
EBV EA IGG SER-ACNC: POSITIVE
EBV NA IGG SER IA-ACNC: 77.5 U/ML — HIGH
EBV PATRN SPEC IB-IMP: SIGNIFICANT CHANGE UP
EBV VCA IGG AVIDITY SER QL IA: POSITIVE
EBV VCA IGM SER IA-ACNC: 557 U/ML — HIGH
EBV VCA IGM SER IA-ACNC: <10 U/ML — SIGNIFICANT CHANGE UP
EBV VCA IGM TITR FLD: NEGATIVE — SIGNIFICANT CHANGE UP
EOSINOPHIL # BLD AUTO: 0 K/UL — SIGNIFICANT CHANGE UP (ref 0–0.5)
EOSINOPHIL NFR BLD AUTO: 0 % — SIGNIFICANT CHANGE UP (ref 0–6)
HCT VFR BLD CALC: 31.7 % — LOW (ref 34.5–45)
HGB BLD-MCNC: 9.8 G/DL — LOW (ref 11.5–15.5)
IMM GRANULOCYTES NFR BLD AUTO: 0.5 % — SIGNIFICANT CHANGE UP (ref 0–1.5)
LYMPHOCYTES # BLD AUTO: 0.48 K/UL — LOW (ref 1–3.3)
LYMPHOCYTES # BLD AUTO: 23.1 % — SIGNIFICANT CHANGE UP (ref 13–44)
MANUAL SMEAR VERIFICATION: SIGNIFICANT CHANGE UP
MCHC RBC-ENTMCNC: 26.6 PG — LOW (ref 27–34)
MCHC RBC-ENTMCNC: 30.9 GM/DL — LOW (ref 32–36)
MCV RBC AUTO: 86.1 FL — SIGNIFICANT CHANGE UP (ref 80–100)
MONOCYTES # BLD AUTO: 0.13 K/UL — SIGNIFICANT CHANGE UP (ref 0–0.9)
MONOCYTES NFR BLD AUTO: 6.3 % — SIGNIFICANT CHANGE UP (ref 2–14)
NEUTROPHILS # BLD AUTO: 1.46 K/UL — LOW (ref 1.8–7.4)
NEUTROPHILS NFR BLD AUTO: 70.1 % — SIGNIFICANT CHANGE UP (ref 43–77)
NRBC # BLD: 0 /100 WBCS — SIGNIFICANT CHANGE UP (ref 0–0)
PLAT MORPH BLD: NORMAL — SIGNIFICANT CHANGE UP
PLATELET # BLD AUTO: 138 K/UL — LOW (ref 150–400)
RBC # BLD: 3.68 M/UL — LOW (ref 3.8–5.2)
RBC # FLD: 15.1 % — HIGH (ref 10.3–14.5)
RBC BLD AUTO: SIGNIFICANT CHANGE UP
WBC # BLD: 2.08 K/UL — LOW (ref 3.8–10.5)
WBC # FLD AUTO: 2.08 K/UL — LOW (ref 3.8–10.5)

## 2019-02-11 RX ORDER — ERYTHROPOIETIN 10000 [IU]/ML
0 INJECTION, SOLUTION INTRAVENOUS; SUBCUTANEOUS
Qty: 0 | Refills: 0 | COMMUNITY

## 2019-02-11 RX ORDER — CELECOXIB 200 MG/1
1 CAPSULE ORAL
Qty: 30 | Refills: 0 | OUTPATIENT
Start: 2019-02-11 | End: 2019-03-12

## 2019-02-11 RX ORDER — ALUMINUM ZIRCONIUM TRICHLOROHYDREX GLY 0.2 G/G
1 STICK TOPICAL
Qty: 0 | Refills: 0 | COMMUNITY

## 2019-02-11 RX ADMIN — Medication 25 MILLIGRAM(S): at 05:54

## 2019-02-11 RX ADMIN — PANTOPRAZOLE SODIUM 40 MILLIGRAM(S): 20 TABLET, DELAYED RELEASE ORAL at 05:54

## 2019-02-11 RX ADMIN — Medication 20 MILLIGRAM(S): at 14:42

## 2019-02-11 RX ADMIN — CELECOXIB 200 MILLIGRAM(S): 200 CAPSULE ORAL at 05:54

## 2019-02-11 RX ADMIN — HEPARIN SODIUM 5000 UNIT(S): 5000 INJECTION INTRAVENOUS; SUBCUTANEOUS at 05:54

## 2019-02-11 NOTE — PROGRESS NOTE ADULT - SUBJECTIVE AND OBJECTIVE BOX
c/c: left hip pain, fever    HPI:  83 y/o F PMHx significant for hypertension, hyperlipidemia, diabetes mellitus type 2, NH lymphoma, s/p 4 cycles R-CHOP (last PET scan in 11/2018 was reportedly (-)), GERD, hx of recurrent SBO presents to the ED for further evaluation of intractable left hip pain (10/10) x 1 day. The patient denies any associated recent trauma. The patient's pain progressively worsened this morning (2/6) and has reportedly not been able to ambulate since 2/2 pain. As per the patient's son, the patient is able to ambulate unassisted at baseline. Of note the patient was recently prescribed a 7 day regimen of Ciprofloxacin 250mg po bid (started on 2/1) for treatment of a "cough." Labs => ESR 58, WBC 2.83, Hgb/Hct 10.6/33.8, , BUN/Cr 43/0.92, RVP (+)hMPV. CT Pelvis => No fracture or dislocation. In the ED Orthopedics was consulted. MRI recommended which showed LEft hip joint effusion.     2/7: pt seen and examined earlier today. Was awaiting IR joint aspiration. No pain at rest. NO sob/chest pain.   2/8 pt c/o several joints swelling and pain, RT knee, RT elbow, LT hip  2/9/19 still with intermittent fevers tmax last night 101.9. +polyarthralgia  2/10/19 feels better after steroids  2/11/19 ambulating well    Review of system- All 10 systems reviewed and is as per HPI otherwise negative.     Vital Signs Last 24 Hrs  T(C): 36.4 (11 Feb 2019 12:58), Max: 36.6 (11 Feb 2019 05:39)  T(F): 97.5 (11 Feb 2019 12:58), Max: 97.9 (11 Feb 2019 05:39)  HR: 61 (11 Feb 2019 12:58) (61 - 64)  BP: 133/52 (11 Feb 2019 12:58) (112/84 - 133/52)  BP(mean): --  RR: 18 (11 Feb 2019 12:58) (18 - 18)  SpO2: 100% (11 Feb 2019 12:58) (98% - 100%)    PHYSICAL EXAM:  GENERAL: Elderly female, laying in bed Comfortable, no acute distress  HEAD:  Atraumatic, Normocephalic  EYES: EOMI, PERRLA  HEENT: Moist mucous membranes  NECK: Supple, No JVD  NERVOUS SYSTEM:  Alert & Oriented X3, non focal.  CHEST/LUNG: Clear to auscultation bilaterally  HEART: Regular rate and rhythm; No murmurs, rubs, or gallops  ABDOMEN: Soft, Nontender, Nondistended; Bowel sounds present  GENITOURINARY- Voiding, no palpable bladder  EXTREMITIES:  No clubbing, cyanosis, or edema  MUSCULOSKELETAL- RT knee and RT elbow swelling resolved  SKIN-no rash    LABS:                        9.8    2.08  )-----------( 138      ( 11 Feb 2019 07:45 )             31.7     10 Feb 2019 07:57    139    |  105    |  35     ----------------------------<  172    3.9     |  26     |  0.97     Ca    8.8        10 Feb 2019 07:57    MEDICATIONS  (STANDING):  celecoxib 200 milliGRAM(s) Oral two times a day  heparin  Injectable 5000 Unit(s) SubCutaneous every 12 hours  metoprolol succinate ER 25 milliGRAM(s) Oral daily  pantoprazole    Tablet 40 milliGRAM(s) Oral before breakfast    MEDICATIONS  (PRN):  acetaminophen   Tablet .. 650 milliGRAM(s) Oral every 6 hours PRN Temp greater or equal to 38C (100.4F), Mild Pain (1 - 3)  docusate sodium 100 milliGRAM(s) Oral three times a day PRN Constipation  morphine  - Injectable 2 milliGRAM(s) IV Push every 6 hours PRN Severe Pain (7 - 10)  ondansetron Injectable 4 milliGRAM(s) IV Push every 6 hours PRN Nausea  senna 2 Tablet(s) Oral at bedtime PRN Constipation  traMADol 25 milliGRAM(s) Oral once PRN Moderate Pain (4 - 6)    ASSESSMENT AND PLAN:  #Polyarthralgia ?etiology  S/p LT hip aspiration by IR  Fluid does not look infected  Rheum eval appreciated  Started on Iv steroids and pt reports feeling better  Will give last dose today and discharge on PO steroids  Cont PT/OOB    #Fevers  ID eval  Maryam appreciated  Monitor Off abx    #Lymphoma  #Neutropenia  Hem-onc eval appreciated, outpatient f/u with DR Enriquez  Observe off Neupogen for now    #HMPV URI:  -isolation precautions  -symptom mx    #Type 2 diabetes mellitus:  -not on meds  -diet controlled    #GERD:  -ppi    #HTN:  -bb    #Dispo- home with home PT today. DC time 40 mins. D/w pt

## 2019-02-11 NOTE — DISCHARGE NOTE ADULT - HOSPITAL COURSE
Patient initially presented with LT hip pain and inability to ambulate. Hip aspirate did not reveal infection. patient developed polyarthritis, seen by rheumatology, started on steroids. Improved and stable for discharge home on PO tapering Prednisone

## 2019-02-11 NOTE — DISCHARGE NOTE ADULT - CARE PLAN
Principal Discharge DX:	Polyarthritis  Goal:	complete PO tapering steroids  Assessment and plan of treatment:	outpatient f/u

## 2019-02-11 NOTE — DISCHARGE NOTE ADULT - MEDICATION SUMMARY - MEDICATIONS TO TAKE
I will START or STAY ON the medications listed below when I get home from the hospital:    predniSONE 5 mg oral tablet  -- 4 tab(s) by mouth once a day for 4 days taper by 1 tab every 4 days  -- It is very important that you take or use this exactly as directed.  Do not skip doses or discontinue unless directed by your doctor.  Obtain medical advice before taking any non-prescription drugs as some may affect the action of this medication.  Take with food or milk.    -- Indication: For reactive arthritis    celecoxib 200 mg oral capsule  -- 1 cap(s) by mouth once a day   -- Indication: For Arthritis    Metoprolol Succinate ER 25 mg oral tablet, extended release  -- 1 tab(s) by mouth once a day  -- Indication: For HTN (hypertension)    Protonix 40 mg oral delayed release tablet  -- 1 tab(s) by mouth once a day  -- Indication: For GERD (gastroesophageal reflux disease)

## 2019-02-11 NOTE — PROGRESS NOTE ADULT - PROVIDER SPECIALTY LIST ADULT
Hospitalist
Infectious Disease
Orthopedics
Orthopedics
Rheumatology
Infectious Disease

## 2019-02-11 NOTE — DISCHARGE NOTE ADULT - CARE PROVIDER_API CALL
Lissett Lang)  Rheumatology  [address] [place] [address]  Phone: [phone]  Fax: [phone]  Follow Up Time:     Jose Enriquez)  Internal Medicine  [address] [place] [address]  Phone: [phone]  Fax: [phone]  Follow Up Time:

## 2019-02-11 NOTE — PROGRESS NOTE ADULT - REASON FOR ADMISSION
Left Hip Pain
Left Hip Pain. Fever
Left Hip Pain

## 2019-02-11 NOTE — PROGRESS NOTE ADULT - ASSESSMENT
83 y/o F PMHx significant for hypertension, hyperlipidemia, diabetes mellitus type 2, NH lymphoma, s/p 4 cycles R-CHOP (last PET scan in 11/2018 was reportedly (-)), GERD, hx of recurrent SBO presents to the ED for further evaluation of intractable left hip pain (10/10) x 1 day. The patient denies any associated recent trauma.  On exam, she has polyarthritis including left hip, right knee, right wrist/hand.  She has had left hip aspiration, which revealed 7161 cell count, cultures negative to date 48 hours.  No crystals seen.  Synovial cultures at 48 hours are negative.   She has had very mild improvement on celebrex thus far.  Polyarthritis is likely viral reactive, though crystalline also still on differential.      Patient is with significant improvement after 2 doses of steroid.      Plan;  -Prior outpatient labs ordered by Dr. Avalos reviewed in [employer].  Patient has had previously negative RF, negative VINICIO and VINICIO subserologies.  Will had CCP for completeness.  Uric acid previously as high as 9, latest reading was 7, here it is 6.2.  The RF on this admission came back borderline +at 14.    -CMV, Parvovirus, EBV serologies ordered per ID  -Would continue celebrex for now.     -She is doing well on 20mg solumedrol IV daily, conservative dosing employed given immunosuppressed state.  Would continue at current dose.  Patient is now able to bear weight and walk with assistance.  Upon discharge, would switch to PO prednisone 20mg daily x 4 days, then 15mg daily x 4 days, then 10mg daily x 4 days, then 5mg daily x 4 days, then stop.  She should follow up in our office within 7-10 days of discharge.    -heme/ID eval appreciated.   -will follow
85 y/o Female with h/o hypertension, hyperlipidemia, diabetes mellitus type 2, NH lymphoma, s/p 4 cycles R-CHOP (last PET scan in 11/2018 was reportedly negative), GERD, hx of recurrent SBO was admitted on 2/6 for intractable left hip pain (10/10) x 1 day. The patient denied any recent trauma. Of note the patient was recently prescribed a 7 day regimen of Ciprofloxacin 250mg po bid (started on 2/1) for treatment of a "cough." PTA. In ER she was noted with hMPv in respiratory tract. After hospitalization, she had a left hip aspiration. On 2/8 she was noted   febrile to 101.9F and left knee pain an swelling.    1. Febrile syndrome improving. Pneumonitis with hMPv. Right knee arthralgia. 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 Immunocompromised host. Allergy to PCN.  -left hip aspirate reviewed; c/w inflammatory arthritis  -BC x 2 and hip aspirate cultures are negative to date  -f/u CMV, EBV, parvovirus B19 serology  -antiinflammatory therapy  -knee joint is less edema  -continue to observe off abx therapy   -droplet isolation  -monitor temps  -f/u CBC  -supportive care  2. Other issues:   -care per medicine
83 y/o Female with h/o hypertension, hyperlipidemia, diabetes mellitus type 2, NH lymphoma, s/p 4 cycles R-CHOP (last PET scan in 11/2018 was reportedly negative), GERD, hx of recurrent SBO was admitted on 2/6 for intractable left hip pain (10/10) x 1 day. The patient denied any recent trauma. Of note the patient was recently prescribed a 7 day regimen of Ciprofloxacin 250mg po bid (started on 2/1) for treatment of a "cough." PTA. In ER she was noted with hMPv in respiratory tract. After hospitalization, she had a left hip aspiration. On 2/8 she was noted   febrile to 101.9F and left knee pain an swelling.    1. Febrile syndrome resolved. Pneumonitis with hMPv. Right knee reactive arthritis. 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 Immunocompromised host. Allergy to PCN.  -left hip aspirate reviewed; c/w inflammatory arthritis  -BC x 2 and hip aspirate cultures are negative to date  -antiinflammatory therapy  -knee joint is less edema  -continue to observe off abx therapy   -droplet isolation  -monitor temps  -f/u CBC  -supportive care  2. Other issues:   -care per medicine
A/P: 84F with left hip pain  FU IR aspiration culture/gram stain  Not likely septic hip based on aspiration cell count  NSAIDs and antiinflammatories as tolerated  Pain control  DVT prophylaxis  WBAT Left lower extremity  PT/OT  No acute orthopedic surgical intervention at this time  Can follow up with Dr. Hensley as outpatient in 1 week if symptoms persist, call office for appointment  Will discuss with Dr. Hensley and advise if plan changes

## 2019-02-11 NOTE — PROGRESS NOTE ADULT - SUBJECTIVE AND OBJECTIVE BOX
Date of service: 02-11-19 @ 14:49    Lying in bed in NAD  Weak looking  Left knee and right hip pain are improved    ROS: no fever or chills; denies dizziness, no HA, no SOB or cough, no abdominal pain, no diarrhea or constipation; no dysuria, no rashes    MEDICATIONS  (STANDING):  celecoxib 200 milliGRAM(s) Oral two times a day  heparin  Injectable 5000 Unit(s) SubCutaneous every 12 hours  methylPREDNISolone sodium succinate Injectable 20 milliGRAM(s) IV Push daily  metoprolol succinate ER 25 milliGRAM(s) Oral daily  pantoprazole    Tablet 40 milliGRAM(s) Oral before breakfast      Vital Signs Last 24 Hrs  T(C): 36.4 (11 Feb 2019 12:58), Max: 36.6 (11 Feb 2019 05:39)  T(F): 97.5 (11 Feb 2019 12:58), Max: 97.9 (11 Feb 2019 05:39)  HR: 61 (11 Feb 2019 12:58) (61 - 64)  BP: 133/52 (11 Feb 2019 12:58) (112/84 - 133/52)  BP(mean): --  RR: 18 (11 Feb 2019 12:58) (18 - 18)  SpO2: 100% (11 Feb 2019 12:58) (98% - 100%)    Physical Exam:      Constitutional: frail looking  HEENT: NC/AT, EOMI, PERRLA, conjunctivae clear  Neck: supple; thyroid not palpable  Back: no tenderness  Respiratory: respiratory effort normal; clear to auscultation  Cardiovascular: S1S2 regular, no murmurs  Abdomen: soft, not tender, not distended, positive BS  Genitourinary: no suprapubic tenderness  Lymphatic: no LN palpable  Musculoskeletal: no muscle tenderness,   Right knee joint swelling or tenderness is much improved; no erythema  Extremities: no pedal edema  Neurological/ Psychiatric: AxOx3, judgement and insight normal; moving all extremities  Skin: no rashes; no palpable lesions    Labs: reviewed                        9.8    2.08  )-----------( 138      ( 11 Feb 2019 07:45 )             31.7     02-10    139  |  105  |  35<H>  ----------------------------<  172<H>  3.9   |  26  |  0.97    Ca    8.8      10 Feb 2019 07:57                        9.6    3.16  )-----------( 119      ( 09 Feb 2019 10:06 )             31.1     02-09    138  |  104  |  33<H>  ----------------------------<  204<H>  3.1<L>   |  24  |  1.13    Ca    8.3<L>      09 Feb 2019 10:06  Phos  2.9     02-08  Mg     2.0     02-08        Culture - Body Fluid with Gram Stain (collected 07 Feb 2019 14:00)  Source: .Body Fluid left hip aspiration  Gram Stain (08 Feb 2019 08:10):    polymorphonuclear leukocytes seen    No organisms seen    by cytocentrifuge  Preliminary Report (09 Feb 2019 11:08):    No growth    Culture - Urine (collected 06 Feb 2019 20:18)  Source: .Urine None  Final Report (08 Feb 2019 07:18):    <10,000 CFU/ml Normal Urogenital ene present    Culture - Blood (collected 06 Feb 2019 19:44)  Source: .Blood None  Preliminary Report (08 Feb 2019 03:01):    No growth to date.    Culture - Blood (collected 06 Feb 2019 18:23)  Source: .Blood Blood  Preliminary Report (08 Feb 2019 01:03):    No growth to date.      Radiology: all available radiological tests reviewed    < from: MR Pelvis Bony Only No Cont (02.07.19 @ 11:16) >  Moderate to large left hip joint effusion with perisynovial soft tissue   edema as well as edema within the adductor musculature. This is   consistent with nonspecific synovitis. If there is concern for infectious   synovitis, joint aspiration is recommended.  There is no fracture or significant arthrosis.  Intermediate signal marrow likely related to posttreatment change.  Subacute appearing mild compression fracture of the superior endplate of   L5 with associated bone marrow edema.    < end of copied text >      Advanced directives addressed: full resuscitation

## 2019-02-11 NOTE — DISCHARGE NOTE ADULT - MEDICATION SUMMARY - MEDICATIONS TO STOP TAKING
I will STOP taking the medications listed below when I get home from the hospital:    Cipro 250 mg oral tablet  -- 1 tab(s) by mouth 2 times a day   -- Avoid prolonged or excessive exposure to direct and/or artificial sunlight while taking this medication.  Check with your doctor before becoming pregnant.  Do not take dairy products, antacids, or iron preparations within one hour of this medication.  Finish all this medication unless otherwise directed by prescriber.  Medication should be taken with plenty of water.

## 2019-02-11 NOTE — DISCHARGE NOTE ADULT - PATIENT PORTAL LINK FT
You can access the [facility] Patient Portal, offered by [facility], by registering with the following website: [email or web address]

## 2019-02-12 ENCOUNTER — INBOUND DOCUMENT (OUTPATIENT)
Age: 84
End: 2019-02-12

## 2019-02-12 LAB
CULTURE RESULTS: SIGNIFICANT CHANGE UP
CULTURE RESULTS: SIGNIFICANT CHANGE UP
SPECIMEN SOURCE: SIGNIFICANT CHANGE UP
SPECIMEN SOURCE: SIGNIFICANT CHANGE UP

## 2019-02-13 LAB
ANA PAT FLD IF-IMP: ABNORMAL
ANA TITR SER: ABNORMAL
B19V IGG SER-ACNC: 7.2 INDEX — HIGH (ref 0–0.8)
B19V IGG+IGM SER-IMP: POSITIVE
B19V IGG+IGM SER-IMP: SIGNIFICANT CHANGE UP
B19V IGM FLD-ACNC: 0.3 INDEX — SIGNIFICANT CHANGE UP (ref 0–0.8)
B19V IGM SER-ACNC: NEGATIVE — SIGNIFICANT CHANGE UP
CCP IGG SERPL-ACNC: <8 UNITS — SIGNIFICANT CHANGE UP (ref 0–19)
CULTURE RESULTS: SIGNIFICANT CHANGE UP
CULTURE RESULTS: SIGNIFICANT CHANGE UP
RF+CCP IGG SER-IMP: NEGATIVE — SIGNIFICANT CHANGE UP
SPECIMEN SOURCE: SIGNIFICANT CHANGE UP

## 2019-02-14 DIAGNOSIS — J06.9 ACUTE UPPER RESPIRATORY INFECTION, UNSPECIFIED: ICD-10-CM

## 2019-02-14 DIAGNOSIS — I12.9 HYPERTENSIVE CHRONIC KIDNEY DISEASE WITH STAGE 1 THROUGH STAGE 4 CHRONIC KIDNEY DISEASE, OR UNSPECIFIED CHRONIC KIDNEY DISEASE: ICD-10-CM

## 2019-02-14 DIAGNOSIS — Z85.72 PERSONAL HISTORY OF NON-HODGKIN LYMPHOMAS: ICD-10-CM

## 2019-02-14 DIAGNOSIS — E78.5 HYPERLIPIDEMIA, UNSPECIFIED: ICD-10-CM

## 2019-02-14 DIAGNOSIS — B97.81 HUMAN METAPNEUMOVIRUS AS THE CAUSE OF DISEASES CLASSIFIED ELSEWHERE: ICD-10-CM

## 2019-02-14 DIAGNOSIS — E43 UNSPECIFIED SEVERE PROTEIN-CALORIE MALNUTRITION: ICD-10-CM

## 2019-02-14 DIAGNOSIS — N18.3 CHRONIC KIDNEY DISEASE, STAGE 3 (MODERATE): ICD-10-CM

## 2019-02-14 DIAGNOSIS — Z88.0 ALLERGY STATUS TO PENICILLIN: ICD-10-CM

## 2019-02-14 DIAGNOSIS — M25.452 EFFUSION, LEFT HIP: ICD-10-CM

## 2019-02-14 DIAGNOSIS — E11.22 TYPE 2 DIABETES MELLITUS WITH DIABETIC CHRONIC KIDNEY DISEASE: ICD-10-CM

## 2019-02-14 DIAGNOSIS — K21.9 GASTRO-ESOPHAGEAL REFLUX DISEASE WITHOUT ESOPHAGITIS: ICD-10-CM

## 2019-02-22 LAB — CULTURE RESULTS: SIGNIFICANT CHANGE UP
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
## 2019-12-09 NOTE — ED ADULT NURSE NOTE - NS ED NOTE ABUSE RESPONSE YN
Dolly called and said that the pharmacy told her that the diagnostic code is not going through for nicotine (NICODERM) 7 MG/24HR patch     With her insurance.    Can you help?  
Pharmacy is calling for DX code for nicotine patches.   
Spoke with pharmacy re DX code.   
Yes

## 2020-06-29 NOTE — ED ADULT NURSE NOTE - CAS TRG GENERAL NORM CIRC DET
Addended by: KIARA GUERRERO on: 6/29/2020 11:59 AM     Modules accepted: Orders     Strong peripheral pulses

## 2020-08-21 NOTE — ED ADULT NURSE NOTE - NS ED NOTE ABUSE SUSPICION NEGLECT YN
Patient: Murali Coyle    Procedure Summary     Date:  08/17/20 Room / Location:   [facility]  /  [facility]    Anesthesia Start:  1400 Anesthesia Stop:  1410    Procedure:  ESOPHAGOGASTRODUODENOSCOPY WITH BIOPSY CPT CODE: 05700 (N/A Esophagus) Diagnosis:       Vomiting, intractability of vomiting not specified, presence of nausea not specified, unspecified vomiting type      (Vomiting, intractability of vomiting not specified, presence of nausea not specified, unspecified vomiting type [R11.10])    Surgeon:  Cristian Rivera MD Provider:  Gómez Dickerson DO    Anesthesia Type:  general ASA Status:  2          Anesthesia Type: general    Vitals  Vitals Value Taken Time   /86 8/17/2020  2:40 PM   Temp 98 °F (36.7 °C) 8/17/2020  2:10 PM   Pulse 64 8/17/2020  2:40 PM   Resp 20 8/17/2020  2:40 PM   SpO2 100 % 8/17/2020  2:40 PM           Post Anesthesia Care and Evaluation    Patient location during evaluation: PHASE II  Patient participation: complete - patient participated  Level of consciousness: awake and alert  Pain score: 1  Pain management: adequate  Airway patency: patent  Anesthetic complications: No anesthetic complications  PONV Status: controlled  Cardiovascular status: acceptable  Respiratory status: acceptable  Hydration status: acceptable       No

## 2021-03-19 NOTE — ED ADULT NURSE NOTE - OBJECTIVE STATEMENT
Daughter Blancarequesting new order for hepatology  Ana Maria Hebert   Order was cancelled   Please call daughter when entered   [phone]   
Daughter, Molly is requesting if the patients  orders can be placed again to Dr Hurt's office.     [phone]  
I called patient's daughter Molly and gave her Dr. Hurt's phone number to call their office and make an appointment.  I left:  [phone] for Dr. Hurt's office.  Referral is in place in the chart, placed by Kaitlynn Elkins NP on 2/9/2021.  
pt here for left hip and lower left leg pain radiating up and down started yesterday

## 2021-08-03 NOTE — DISCHARGE NOTE ADULT - ADMISSION DATE +STARTOFVISITDATE
[address] [facility]  [address] [address] [place] [address]  Dept: [phone]  Dept Fax: [phone]  Loc: [phone]    Isidra Carlson is a 2 y.o. male who presents today for his medical conditions/complaintsas noted below. Isidra Carlson is c/o of Insect Bite (RLE)        HPI:     HPI   Howie is here with complaint of insect bite to his right leg since yesterday. The bite to the right leg has worsened today. It is now warm and swollen. Dad reports giving Benadryl with little relief. The area is painful to touch and is itching. He has had no fever or associated symptoms. Past Medical History:   Diagnosis Date    Acid reflux     Influenza     4 mths of age   Keith Spina Laryngomalacia, congenital     CARMEN (obstructive sleep apnea)      Past Surgical History:   Procedure Laterality Date    ADENOIDECTOMY  12/2018    for CARMEN    LARYNGOPLASTY  12/2018    congenital laryngomalacia    LARYNX SURGERY         Family History   Problem Relation Age of Onset    Depression Mother     Anxiety Disorder Mother     Other Mother         narcolepsy, GERD   Keith Spina Migraines Mother     No Known Problems Father     Diabetes type 2  Maternal Grandmother     High Blood Pressure Maternal Grandfather     No Known Problems Paternal Grandmother     Cancer Paternal Grandfather         throat cancer       Social History     Tobacco Use    Smoking status: Never Smoker    Smokeless tobacco: Never Used   Substance Use Topics    Alcohol use: No      Current Outpatient Medications   Medication Sig Dispense Refill    cephALEXin (KEFLEX) 250 MG/5ML suspension Give 5 ml po tid for 10 days 150 mL 0    albuterol (ACCUNEB) 1.25 MG/3ML nebulizer solution Inhale 3 mLs into the lungs every 4 hours as needed for Wheezing or Shortness of Breath (Patient not taking: Reported on 8/2/2021) 60 vial 1     No current facility-administered medications for this visit.      No Known Allergies    Health Statement Selected Maintenance   Topic Date Due    Flu vaccine (1) 09/01/2021    Polio vaccine (4 of 4 - 4-dose series) 09/09/2021    Measles,Mumps,Rubella (MMR) vaccine (2 of 2 - Standard series) 09/09/2021    Varicella vaccine (2 of 2 - 2-dose childhood series) 09/09/2021    DTaP/Tdap/Td vaccine (5 - DTaP) 09/09/2021    HPV vaccine (1 - Male 2-dose series) 09/09/2028    Meningococcal (ACWY) vaccine (1 - 2-dose series) 09/09/2028    Hepatitis A vaccine  Completed    Hepatitis B vaccine  Completed    Hib vaccine  Completed    Pneumococcal 0-64 years Vaccine  Completed    Lead screen 3-5  Completed    Rotavirus vaccine  Aged Out       Subjective:     Review of Systems   Constitutional: Negative for activity change, appetite change, chills and fever. Respiratory: Negative for cough. Musculoskeletal:        Swelling to right lower leg   Skin: Positive for color change. Negative for rash. Bite to right lower leg, erythema, edema   Allergic/Immunologic: Negative. Neurological: Negative for headaches.       :Objective      Physical Exam  Vitals and nursing note reviewed. Constitutional:       General: He is awake and active. He is not in acute distress. Appearance: Normal appearance. He is well-developed and normal weight. He is not ill-appearing. HENT:      Head: Normocephalic and atraumatic. Right Ear: External ear normal.      Left Ear: External ear normal.      Nose: Nose normal.      Mouth/Throat:      Lips: Pink. Eyes:      General: Vision grossly intact. Conjunctiva/sclera: Conjunctivae normal.   Neck:      Trachea: Phonation normal.   Cardiovascular:      Rate and Rhythm: Normal rate and regular rhythm. Pulmonary:      Effort: Pulmonary effort is normal. No respiratory distress. Musculoskeletal:         General: Normal range of motion. Cervical back: Neck supple. Skin:     General: Skin is warm and dry. Capillary Refill: Capillary refill takes less than 2 seconds. Findings: Erythema and rash present. Rash is papular. Comments: Skin marker used to define margins of erythema    Neurological:      General: No focal deficit present. Mental Status: He is alert, oriented for age and easily aroused. Mental status is at baseline. Psychiatric:         Attention and Perception: Attention normal.         Mood and Affect: Mood and affect normal.         Speech: Speech normal.         Behavior: Behavior normal.       Pulse 93   Temp 97.8 °F (36.6 °C)   Wt 44 lb 6.4 oz (20.1 kg)   SpO2 100%     :Assessment       Diagnosis Orders   1. Insect bite of right lower leg, initial encounter     2. Cellulitis of right lower extremity         :Plan    No orders of the defined types were placed in this encounter. No follow-ups on file. Orders Placed This Encounter   Medications    cephALEXin (KEFLEX) 250 MG/5ML suspension     Sig: Give 5 ml po tid for 10 days     Dispense:  150 mL     Refill:  0        Patient Instructions     Clean area to right lower leg with soap and water daily  OTC Benadryl per pkg directions  May use Triamcinolone twice daily as directed for itching   Keflex as directed  Follow up with PCP or return to Urgent Care for worsening or unresolved symptoms. Patient Education        Insect Stings and Bites in Children: Care Instructions  Your Care Instructions  Stings and bites from bees, wasps, ants, and other insects often cause pain, swelling, redness, and itching around the sting or bite. They usually don't cause reactions all over the body. In children, the redness and swelling may be worse than in adults. They may extend several inches beyond the sting or bite. If your child has a reaction to an insect sting or bite, your child is at risk for future reactions. Your doctor will help you know how to treat your child's sting or bite, and how to best prepare for any future problems.   Follow-up care is a key part of your child's treatment and weak, confused, or restless.     · Your child seems to be having a severe reaction that is like one he or she has had before. Give your child an epinephrine shot right away. Get emergency care, even if your child feels better. Call your doctor now or seek immediate medical care if:    · Your child has symptoms of an allergic reaction not right at the sting or bite, such as:  ? A rash or small area of hives (raised, red areas on the skin). ? Itching. ? Swelling. ? Belly pain, nausea, or vomiting.     · Your child has a lot of swelling around the site of the sting or bite (such as the entire arm or leg is swollen).     · Your child has signs of infection, such as:  ? Increased pain, swelling, redness, or warmth around the sting or bite. ? Red streaks leading from the area. ? Pus draining from the sting or bite. ? A fever. Watch closely for changes in your child's health, and be sure to contact your doctor if:    · Your child does not get better as expected. Where can you learn more? Go to https://[email or web address].Babycare. org and sign in to your [employer] Twitter Dashboard account. Enter R153 in the [employer] box to learn more about \"Insect Stings and Bites in Children: Care Instructions. \"     If you do not have an account, please click on the \"Sign Up Now\" link. Current as of: October 19, 2020               Content Version: 12.9  © 2930-2356 [employer]. Care instructions adapted under license by [facility] ([facility] [place] If you have questions about a medical condition or this instruction, always ask your healthcare professional. [address] [address] any warranty or liability for your use of this information. Patient given educational materials- see patient instructions. Discussed use, benefit, and side effects of prescribedmedications. All patient questions answered. Pt voiced understanding.        Electronically signed by GLENN Welch CNP on 8/3/2021 at 6:37 AM

## 2021-12-06 NOTE — ED PROVIDER NOTE - PSH
Called Alyssa from [employer] and she was calling us to let us know that the pt's mesalmine (delzicol) will not longer be on the formulary.  She states that pentasa would be on the formulary and also sulfasalazine is on the formulary.  She said we could get a tier exemption and she could mail us the form. She did report that the pt did not think that the mesalamine was working very well.  She reports that the pt wanted Dr Ricketts to decide what med to take. Advised will send message to Dr Ricketts.   Verb understanding.    H/O: hysterectomy    S/P appendectomy    S/P cholecystectomy

## 2022-01-01 NOTE — PATIENT PROFILE ADULT. - ARE SIGNIFICANT INDICATORS COMPLETE.
Arden Hylton MD  P  [facility] seen in ED and has RSV  Please call and see how baby is doing and see if mom would like a follow up appointment   Thank you!            Discharge Notification     Patient: Dee Peter  : 2022 (3 mos)  No data recorded  PCP: Arden Hylton MD  Attending: Staci Tadeo, [address] [place] [address], Unit: [address] [place] [address] ED  Admission Date: 2022  ER Presenting complaint:  cold and fever  Admitting Diagnosis: Cough [R05 9] No Yes

## 2022-02-09 NOTE — H&P ADULT - NSHPREVIEWOFSYSTEMS_GEN_ALL_CORE
ROS:.  [X] A ten-point review of systems was otherwise negative except as noted.  Systemic:	[ ] Fever	[ ] Chills	[ ] Night sweats    [ ] Fatigue	[ ] Other  [] Cardiovascular:  [] Pulmonary:  [] Renal/Urologic:  [] Gastrointestinal: abdominal pain, vomiting  [] Metabolic:  [] Neurologic:  [] Hematologic:  [] ENT:  [] Ophthalmologic:  [] Musculoskeletal:    [ ] Due to altered mental status/intubation, subjective information were not able to be obtained from the patient. History was obtained, to the extent possible, from review of the chart and collateral sources of information. 89yo Female lives alone at home, independent, ambulates sometimes with a rolator, baseline AAOx2-3 with PMH of Urinary incontinence, HTN and PSH of left mastectomy (left breast CA 30 yrs ago.  Presented to the ED after fall.  Admitted for left  olecranon fracture & left hip fracture. Ortho consulted   Pt had jalloh placed in ED and was found to have gross hematuria, CT abd and pelvis showed large pelvic mass suspicious for malignancy, bone scan was done and did not show any osseous metastasis, Heme/onc on board and was recommended for cystoscopy but pt refused cysto at this time.  Pt was given 1 PRBC for optimization prior to OR , S/p L hip IM nailing on 2/6, Patient s/p OR 2/8  for ORIF of left olecranon fracture.    02/09- pt seen and examined at bedside, leukocytosis 32.24, trending down, as per hem/onc likely reactive to stress s/p surgery, will c/w monitoring.

## 2022-02-23 NOTE — H&P ADULT - PMH
[address] [address]  Inpatient Rehabilitation  Occupational Therapy  Discharge Note  Time:  Time In: 1330  Time Out: 1400  Timed Code Treatment Minutes: 30 Minutes  Minutes: 30          Date: 2022  Patient Name: Rae Sampson,   Gender: female      Room: [address] [place], [place] [address]-  MRN: [id number]  : 1945  (77 y.o.)  Referring Practitioner: LUANN Cerna  Diagnosis: spinal stenosis of lumbar region with radiculopathy  Additional Pertinent Hx: Per EMR \"Gail is a 77y/o female known to our office having been seen in the past several months with complaints of low back pain and leg pain. She has been through multiple epidural injections, most recently last month, with some mild-moderate relief of her pain. Over the past several days her pain had been worsening significantly to the point it was completely uncontrolled and her mobility had declined significantly. She called our office and the only recommendation was for her to present to the ED for evaluation and admission for further care. She does have a history of previous lumbar spine surgery roughly 20yrs ago. History of pacemaker, afib and is on plavix as well. She describes a sharp pain traveling from the low back into the left lateral/anterior thigh are. No new changes in bowel/bladder continence. \" L1-L4 Laminectomy with complication (durotomy) on     Restrictions/Precautions:  Restrictions/Precautions: General Precautions,Fall Risk,Surgical Protocols  Required Braces or Orthoses  Spinal: Lumbar Corset  Position Activity Restriction  Spinal Precautions: No Bending,No Lifting,No Twisting  Other position/activity restrictions: L1-L4 Laminectomy with complication (durotomy) on     SUBJECTIVE: cooperative, eager to go home.  Daughter in law in room for education    PAIN: 0/10: no c/o pain during session    Vitals: Vitals not assessed per clinical judgement, see nursing flowsheet    COGNITION: WFL    ADL:   LE dressing: Pt donned B socks with s/u & shoes with max A   Toileting: Modified Independent. Toilet Transfer: Modified Independent. .  **Recommended family at house when Pt gets into & out of shower at home the first few times. BALANCE:  Standing Balance: Modified Independent. BED MOBILITY:  Not Tested    TRANSFERS:  Sit to Stand:  Modified Independent. Stand to Sit: Modified Independent. FUNCTIONAL MOBILITY:  Assistive Device: Rolling Walker  Assist Level:  Modified Independent. Distance: To and from bathroom        ADDITIONAL ACTIVITIES:  Therapist reviewed back precautions with daughter in law & where to obtain reacher & travel sockaide. Discussed removal of throw rugs & widening thru areas of house. No further questions verbalized. ASSESSMENT:  Activity Tolerance:  Patient tolerance of  treatment: good. Assessment: Pt. has progressed well with OT services during her IPR stay  Pt. has progressed towards 6 minutes of standing during functional tasks. Pt. demo improved safety/performance with toilet transfer/toileting task to Mod I. Pt. has progressed with full body dressing with set up/clean up assistance, pt able to don/doff brace with good safety. Pt is  attentive to spinal precautions & required only 1-2 vcs for precautions during recent sessions. Pt. plan [facility] to promote safe transitions to home. Pt. to benefit from further training during IADLs to further advance towards PLOF. Discharge Recommendations: Home with A prn & HH OT, aide  Equipment Recommendations: Equipment Needed: Yes  Mobility Devices: ADL Assistive Devices  Other: Pt. Reports she will be utilizing her daughter in laws shower chair with a back for showering purposes. She will be ordering a sock aid, dressing stick and walker tray table. Pt. Reports she owns a reacher, raised toilet seat and long handled shoe horn.   Plan: Discharge home today with A prn   Current Treatment Recommendations: Priscillaella File Training,Neuromuscular Re-education,Patient/Caregiver Education & Training,Self-Care / ADL,Equipment Evaluation, Education, & procurement,Safety Education & Training    Patient Education  Patient Education: see above    Goals  Short term goals  Time Frame for Short term goals: by discharge  Short term goal 1: patient will tolerate 7 min functional standing with two hand release with (S) to increase activity tolerance for grooming and toileting. NOT MET  Short term goal 2: GOAL MET  Short term goal 3: Discontinued  Short term goal 4: Discontinued  Short term goal 5: GOAL MET  Long term goals  Time Frame for Long term goals : 2 weeks  Long term goal 1: GOAL MET  Long term goal 2: patient will complete light meal prep task with MOD I within spinal precautions. NOT MET  Long term goal 3: patient will complete grocery shopping task with MOD I using strategies to reach items within spinal precautions. NOT MET  Long term goal 4: patient will complete ADL routine with MOD I with use of AE PRN. NOT MET  Long term goal 5: patient will retrieve items from various heights with least restrictive device and AE PRN with MOD I to safely transition to prior living environment to complete IADLs. PART MET    Following session, patient left in safe position with all fall risk precautions in place. Anemia, unspecified type    CKD (chronic kidney disease) stage 3, GFR 30-59 ml/min    Diabetes    GERD (gastroesophageal reflux disease)    High cholesterol    HTN (hypertension)

## 2022-06-20 NOTE — ED PROVIDER NOTE - CADM POA CENTRAL LINE
[FreeTextEntry3] : Stew Dsouza M.D.\par Director Emeritus of Urology\par [facility]\par [address] [place] [address], [address]\[place] [address] 
No

## 2022-08-14 NOTE — DISCHARGE NOTE ADULT - PATIENT PORTAL LINK FT
24.8
You can access the [facility] Patient Portal, offered by [facility], by registering with the following website: [email or web address]

## 2023-08-31 NOTE — ED STATDOCS - OBJECTIVE STATEMENT
"83 y/o F with PMHx of Lymphoma, last chemo treatment in 08/2018, HTN, HLD, Type II DM, SBO, and GERD presenting to ED with son c/o worsening left hip pain since yesterday. Pt speaks Rwandan, HPI obtained via son translating at bedside. Pain worsened this morning, severely limited ability to ambulate secondary to pain. Able to walk without assistance at baseline. No recent falls or other trauma. Currently on Cipro for cough. Denies any fevers, chills, abd pain, N/V/D, or CP. Allergic to Penicillin. Oncologist: Dr. Polanco in [place].

## 2024-05-20 NOTE — ED ADULT NURSE NOTE - BREATH SOUNDS, MLM
----- Message from Toi Sow sent at 5/20/2024 10:29 AM CDT -----  Type: Needs Medical Advice  Who Called:  pt  Best Call Back Number: [phone]    Additional Information: Pt is calling the office has infection in big toe needs soon appt.Please call back and advise.  
Spoke with pt, scheduled for appt with Dr. Briones tomorrow at 1020 for infected toenail.   
Clear

## 2024-09-23 NOTE — ED PROVIDER NOTE - DATE/TIME 1
81F PMH hypertension, arthritis, Neuralgia, osteoporotic compression fractures L5-S1, hyponatremia (HCTZ induced, corrected), goiter, who presented to the ED for left sided facial droop and SOB. Pt seen at bedside, on NIV , AAOX1, and did not have a facial droop, more awake and alert compared to when she came to the ED as per ED attending. 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 secondary to underlying lung disease/PNA vs cardiac stretching of ANP/diastolic CHF  2) Acute on chronic cor pulmonale  3) Acute on chronic hypoxic/hypercapnic  respiratory failure on nasal cannula oxygen  4) Hypertension  5) Diaphragmatic paralysis/Thoracic spine disease  6) Electrolytes disorder  7) Acid-base disorder  8) Dementia with cognitive impairment    Recommend:  Strict I/o  Avoid Nephrotoxic agents  Monitor ABG/ supplemental oxygen/ BIPAP  Na level 131  Urine studies reviewed  Check TSH, Lipid panel, serum osmolality, uric acid level  Start sodium chloride 1 gm po tid   Intermittent Lasix/Acetazolamide as needed to achieve euvolemic state  Adjust antibiotics as per renal dose adjustment  Optimal BP control  Neurology work up in progress  Chest PT/Pulmonary team follow up  Volume status hypervolemic with electrolytes imbalance  Replete electrolytes with goal K>4 and <5, Mg> 2 and Phos 2.5 to 3.5  Will follow 23-Jun-2017 17:35
